# Patient Record
Sex: FEMALE | Race: WHITE | NOT HISPANIC OR LATINO | Employment: FULL TIME | URBAN - NONMETROPOLITAN AREA
[De-identification: names, ages, dates, MRNs, and addresses within clinical notes are randomized per-mention and may not be internally consistent; named-entity substitution may affect disease eponyms.]

---

## 2017-01-09 ENCOUNTER — CLINICAL SUPPORT (OUTPATIENT)
Dept: FAMILY MEDICINE CLINIC | Facility: CLINIC | Age: 42
End: 2017-01-09

## 2017-01-09 DIAGNOSIS — E53.8 VITAMIN B12 DEFICIENCY: Primary | ICD-10-CM

## 2017-01-09 PROCEDURE — 96372 THER/PROPH/DIAG INJ SC/IM: CPT | Performed by: INTERNAL MEDICINE

## 2017-01-09 RX ORDER — CYANOCOBALAMIN 1000 UG/ML
1000 INJECTION, SOLUTION INTRAMUSCULAR; SUBCUTANEOUS ONCE
Status: COMPLETED | OUTPATIENT
Start: 2017-01-09 | End: 2017-01-09

## 2017-01-09 RX ADMIN — CYANOCOBALAMIN 1000 MCG: 1000 INJECTION, SOLUTION INTRAMUSCULAR; SUBCUTANEOUS at 16:21

## 2017-02-09 ENCOUNTER — CLINICAL SUPPORT (OUTPATIENT)
Dept: FAMILY MEDICINE CLINIC | Facility: CLINIC | Age: 42
End: 2017-02-09

## 2017-02-09 DIAGNOSIS — E53.8 VITAMIN B12 DEFICIENCY: Primary | ICD-10-CM

## 2017-02-09 PROCEDURE — 96372 THER/PROPH/DIAG INJ SC/IM: CPT | Performed by: INTERNAL MEDICINE

## 2017-02-09 RX ORDER — CYANOCOBALAMIN 1000 UG/ML
1000 INJECTION, SOLUTION INTRAMUSCULAR; SUBCUTANEOUS ONCE
Status: COMPLETED | OUTPATIENT
Start: 2017-02-09 | End: 2017-02-09

## 2017-02-09 RX ADMIN — CYANOCOBALAMIN 1000 MCG: 1000 INJECTION, SOLUTION INTRAMUSCULAR; SUBCUTANEOUS at 15:59

## 2017-03-22 ENCOUNTER — OFFICE VISIT (OUTPATIENT)
Dept: GASTROENTEROLOGY | Facility: CLINIC | Age: 42
End: 2017-03-22

## 2017-03-22 ENCOUNTER — PREP FOR SURGERY (OUTPATIENT)
Dept: GASTROENTEROLOGY | Facility: CLINIC | Age: 42
End: 2017-03-22

## 2017-03-22 VITALS
WEIGHT: 139 LBS | DIASTOLIC BLOOD PRESSURE: 70 MMHG | HEIGHT: 65 IN | BODY MASS INDEX: 23.16 KG/M2 | TEMPERATURE: 99.5 F | RESPIRATION RATE: 15 BRPM | HEART RATE: 75 BPM | SYSTOLIC BLOOD PRESSURE: 127 MMHG

## 2017-03-22 DIAGNOSIS — K62.5 BRIGHT RED BLOOD PER RECTUM: ICD-10-CM

## 2017-03-22 DIAGNOSIS — Z12.11 COLON CANCER SCREENING: ICD-10-CM

## 2017-03-22 DIAGNOSIS — K62.89 ANAL OR RECTAL PAIN: ICD-10-CM

## 2017-03-22 DIAGNOSIS — K62.89 ANAL OR RECTAL PAIN: Primary | Chronic | ICD-10-CM

## 2017-03-22 DIAGNOSIS — K62.5 BRIGHT RED BLOOD PER RECTUM: Chronic | ICD-10-CM

## 2017-03-22 DIAGNOSIS — Z12.11 COLON CANCER SCREENING: Primary | ICD-10-CM

## 2017-03-22 PROBLEM — E53.8 COBALAMIN DEFICIENCY: Status: ACTIVE | Noted: 2017-03-22

## 2017-03-22 PROBLEM — M54.6 PAIN IN THORACIC SPINE: Status: ACTIVE | Noted: 2017-03-22

## 2017-03-22 PROBLEM — M54.9 BACK PAIN: Status: ACTIVE | Noted: 2017-03-22

## 2017-03-22 PROBLEM — M79.2 NEURALGIA: Status: ACTIVE | Noted: 2017-03-22

## 2017-03-22 PROBLEM — E83.52 HYPERCALCEMIA: Status: ACTIVE | Noted: 2017-03-22

## 2017-03-22 PROBLEM — E03.9 HYPOTHYROIDISM: Status: ACTIVE | Noted: 2017-03-22

## 2017-03-22 PROBLEM — E55.9 VITAMIN D DEFICIENCY: Status: ACTIVE | Noted: 2017-03-22

## 2017-03-22 PROCEDURE — 99214 OFFICE O/P EST MOD 30 MIN: CPT | Performed by: NURSE PRACTITIONER

## 2017-03-22 RX ORDER — VALACYCLOVIR HYDROCHLORIDE 500 MG/1
TABLET, FILM COATED ORAL
Refills: 1 | COMMUNITY
Start: 2017-02-20 | End: 2017-03-31

## 2017-03-22 RX ORDER — CALCIUM POLYCARBOPHIL 625 MG
TABLET ORAL DAILY
COMMUNITY
End: 2017-03-31 | Stop reason: ALTCHOICE

## 2017-03-22 RX ORDER — METRONIDAZOLE 500 MG/1
TABLET ORAL
Refills: 0 | COMMUNITY
Start: 2017-01-17 | End: 2017-03-31

## 2017-03-22 RX ORDER — SODIUM CHLORIDE 9 MG/ML
70 INJECTION, SOLUTION INTRAVENOUS CONTINUOUS PRN
Status: CANCELLED | OUTPATIENT
Start: 2017-03-22

## 2017-03-22 RX ORDER — SODIUM CHLORIDE 0.9 % (FLUSH) 0.9 %
1-10 SYRINGE (ML) INJECTION AS NEEDED
Status: CANCELLED | OUTPATIENT
Start: 2017-03-22

## 2017-03-22 RX ORDER — CLINDAMYCIN PHOSPHATE 20 MG/G
CREAM VAGINAL
Refills: 1 | COMMUNITY
Start: 2017-02-27 | End: 2017-03-31

## 2017-03-22 RX ORDER — CLOTRIMAZOLE AND BETAMETHASONE DIPROPIONATE 10; .64 MG/G; MG/G
CREAM TOPICAL
Refills: 0 | COMMUNITY
Start: 2016-12-28 | End: 2017-03-31

## 2017-03-22 NOTE — PATIENT INSTRUCTIONS
1. Obtain lab results from Dr. Verdugo's office.  2. High fiber diet with liberal water intake. Discussed in detail.  3. Colonoscopy: Description of the procedure, risks, benefits, alternatives and options, including nonoperative options, were discussed with the patient in detail. The patient understands and wishes to proceed.

## 2017-03-22 NOTE — PROGRESS NOTES
[address] [place] [address]    (H) [phone]  (W) [phone]    Chief Complaint   Patient presents with   • Rectal Bleeding   • Rectal Pain     The patient states she is here for evaluation of painful bowel movements. She states she had a yeast infection approximately 3 months ago and this proceeded to cause her pain in her vaginal and rectal area. The patient states the pain had continued until last month when it started improving. She states she had taken Flagyl orally and used a topical cream and symptoms improved. Then last week the rectal and vaginal pain returned. The patient states she has had improvement currently.    The patient denies abdominal pain, nausea or vomiting. There is no history of constipation or diarrhea. The patient denies melena. She states that occasionally she may have bright red blood on the tissue. There is no history of melena. The patient has not had a colonoscopy in the past and there is no family history of colon cancer.    Rectal Bleeding   This is a chronic problem. The current episode started more than 1 month ago (December 2016). The problem occurs rarely. The problem has been gradually improving. Pertinent negatives include no abdominal pain, arthralgias, chest pain, chills, coughing, fatigue, fever, headaches, joint swelling, myalgias, nausea, rash, vertigo or vomiting. Nothing aggravates the symptoms. She has tried nothing for the symptoms.     Review of Systems   Constitutional: Negative for appetite change, chills, fatigue, fever and unexpected weight change.   HENT: Negative for mouth sores, nosebleeds and trouble swallowing.    Eyes: Negative for discharge and redness.   Respiratory: Negative for apnea, cough and shortness of breath.    Cardiovascular: Negative for chest pain, palpitations and leg swelling.   Gastrointestinal: Positive for hematochezia. Negative for abdominal distention, abdominal pain, anal bleeding, blood in stool, constipation,  diarrhea, nausea and vomiting.   Endocrine: Negative for cold intolerance, heat intolerance and polydipsia.   Genitourinary: Negative for dysuria, hematuria and urgency.   Musculoskeletal: Negative for arthralgias, joint swelling and myalgias.   Skin: Negative for rash.   Allergic/Immunologic: Negative for food allergies and immunocompromised state.   Neurological: Negative for dizziness, vertigo, seizures, syncope and headaches.   Hematological: Negative for adenopathy. Does not bruise/bleed easily.   Psychiatric/Behavioral: Negative for dysphoric mood. The patient is not nervous/anxious and is not hyperactive.      Patient Active Problem List   Diagnosis   • Anal or rectal pain   • Bright red blood per rectum   • Back pain   • Hypercalcemia   • Hypothyroidism   • Neuralgia   • Pain in thoracic spine   • Cobalamin deficiency   • Vitamin D deficiency   • Colon cancer screening     Past Medical History:   Diagnosis Date   • B12 deficiency 2015   • Hyperlipidemia    • Hypothyroidism 2015   • Restless leg syndrome 1995   • Snores      History reviewed. No pertinent surgical history.  Family History   Problem Relation Age of Onset   • Lung cancer Father    • Other Other      arteriosclerotic cardiovascular disease, cerebrovascular accident   • Breast cancer Other    • Colon cancer Neg Hx      Social History   Substance Use Topics   • Smoking status: Never Smoker   • Smokeless tobacco: Never Used   • Alcohol use Yes      Comment: unknown       Current Outpatient Prescriptions:   •  ARMOUR THYROID 15 MG tablet, TAKE 1 TABLET DAILY, Disp: 90 tablet, Rfl: 1  •  Cats Claw, Uncaria tomentosa, (CATS CLAW PO), Take  by mouth., Disp: , Rfl:   •  OLIVE LEAF EXTRACT PO, Take  by mouth., Disp: , Rfl:   •  vitamin B-12 (CYANOCOBALAMIN) 1000 MCG tablet, Take 1,000 mcg by mouth daily., Disp: , Rfl:   •  BLACK WALNUT POLLEN SC, Inject  under the skin., Disp: , Rfl:   •  cholecalciferol (VITAMIN D3) 1000 UNITS tablet, Take 1,000 Units  "by mouth daily., Disp: , Rfl:   •  clindamycin (CLEOCIN) 2 % vaginal cream, insert 1 applicatorful vaginally AT BEDTIME  FOR 5 DAYS, Disp: , Rfl: 1  •  clotrimazole-betamethasone (LOTRISONE) 1-0.05 % cream, , Disp: , Rfl: 0  •  metroNIDAZOLE (FLAGYL) 500 MG tablet, , Disp: , Rfl: 0  •  polycarbophil 625 MG tablet tablet, Take  by mouth Daily., Disp: , Rfl:   •  valACYclovir (VALTREX) 500 MG tablet, TAKE 1 TABLET BY MOUTH TWO TIMES A DAY, Disp: , Rfl: 1    Current Facility-Administered Medications:   •  cyanocobalamin injection 1,000 mcg, 1,000 mcg, Intramuscular, Q28 Days, Paty Reinoso MD, 1,000 mcg at 08/05/16 1407  •  cyanocobalamin injection 1,000 mcg, 1,000 mcg, Intramuscular, Q28 Days, Paty Reinoso MD, 1,000 mcg at 09/13/16 1647  No Known Allergies  /70  Pulse 75  Temp 99.5 °F (37.5 °C)  Resp 15  Ht 65\" (165.1 cm)  Wt 139 lb (63 kg)  LMP 03/06/2017 (Approximate)  BMI 23.13 kg/m2     Physical Exam   Constitutional: She is oriented to person, place, and time. She appears well-developed and well-nourished. No distress.   HENT:   Head: Normocephalic and atraumatic.   Right Ear: Hearing and external ear normal.   Left Ear: Hearing and external ear normal.   Nose: Nose normal.   Mouth/Throat: Oropharynx is clear and moist and mucous membranes are normal. Mucous membranes are not pale, not dry and not cyanotic. No oral lesions. No oropharyngeal exudate.   Eyes: Conjunctivae and EOM are normal. Right eye exhibits no discharge. Left eye exhibits no discharge.   Neck: Trachea normal. Neck supple. No JVD present. No edema present. No thyroid mass and no thyromegaly present.   Cardiovascular: Normal rate, regular rhythm, S2 normal and normal heart sounds.  Exam reveals no gallop, no S3 and no friction rub.    No murmur heard.  Pulmonary/Chest: Effort normal and breath sounds normal. No respiratory distress. She exhibits no tenderness.   Abdominal: Normal appearance and bowel sounds are normal. She exhibits " no distension, no ascites and no mass. There is no splenomegaly or hepatomegaly. There is no tenderness. There is no rigidity, no rebound and no guarding. No hernia.       Vascular Status -  Her exam exhibits no right foot edema. Her exam exhibits no left foot edema.  Lymphadenopathy:     She has no cervical adenopathy.        Left: No supraclavicular adenopathy present.   Neurological: She is alert and oriented to person, place, and time. She has normal strength. No cranial nerve deficit or sensory deficit.   Skin: No rash noted. She is not diaphoretic. No cyanosis. No pallor. Nails show no clubbing.   Psychiatric: She has a normal mood and affect.   Nursing note and vitals reviewed.    Laboratory Tests:    Upon review of records:    Dated 12/6/2016 glucose 86 BUN 13 creatinine 0.8 sodium 135 potassium 4.3 chloride 102 CO2 24 calcium 10.3 albumin 4.5 ALT 15 AST 18 alkaline phosphatase 47 total bilirubin 1.9 WBC 7.89 hemoglobin 13.4 hematocrit 40.3 platelet count 254 MCV 92.4 vitamin B12 608 vitamin D 29.4 TSH 3.148    Lucille was seen today for rectal bleeding and rectal pain.    Diagnoses and all orders for this visit:    Anal or rectal pain  Comments:  History of pain in rectum with bowel movements. Differentials include hemorrhoids, underlying colonic neoplastic disease.    Bright red blood per rectum  Comments:  Differentials include hemorrhoids, fissure, vascular ectasia.    Colon cancer screening  Comments:  No history of previous colonoscopy. No family history of colon cancer.        Plan   Patient Instructions   1. Obtain lab results from Dr. Verdugo's office.  2. High fiber diet with liberal water intake. Discussed in detail.  3. Colonoscopy: Description of the procedure, risks, benefits, alternatives and options, including nonoperative options, were discussed with the patient in detail. The patient understands and wishes to proceed.    Patient Care Team:  Paty Reinoso MD as PCP - General Karolyn FOLEY  Gurdeep, APRN

## 2017-04-04 ENCOUNTER — ANESTHESIA EVENT (OUTPATIENT)
Dept: GASTROENTEROLOGY | Facility: HOSPITAL | Age: 42
End: 2017-04-04

## 2017-04-04 ENCOUNTER — ANESTHESIA (OUTPATIENT)
Dept: GASTROENTEROLOGY | Facility: HOSPITAL | Age: 42
End: 2017-04-04

## 2017-04-04 ENCOUNTER — HOSPITAL ENCOUNTER (OUTPATIENT)
Facility: HOSPITAL | Age: 42
Setting detail: HOSPITAL OUTPATIENT SURGERY
Discharge: HOME | End: 2017-04-04
Attending: INTERNAL MEDICINE | Admitting: INTERNAL MEDICINE

## 2017-04-04 VITALS
BODY MASS INDEX: 23.32 KG/M2 | DIASTOLIC BLOOD PRESSURE: 63 MMHG | RESPIRATION RATE: 18 BRPM | TEMPERATURE: 97.8 F | HEART RATE: 70 BPM | WEIGHT: 140 LBS | SYSTOLIC BLOOD PRESSURE: 100 MMHG | HEIGHT: 65 IN | OXYGEN SATURATION: 100 %

## 2017-04-04 DIAGNOSIS — Z12.11 COLON CANCER SCREENING: ICD-10-CM

## 2017-04-04 DIAGNOSIS — K62.5 BRIGHT RED BLOOD PER RECTUM: ICD-10-CM

## 2017-04-04 DIAGNOSIS — K62.89 ANAL OR RECTAL PAIN: ICD-10-CM

## 2017-04-04 LAB — B-HCG UR QL: NEGATIVE

## 2017-04-04 PROCEDURE — 25010000002 PROPOFOL 10 MG/ML EMULSION

## 2017-04-04 PROCEDURE — G0121 COLON CA SCRN NOT HI RSK IND: HCPCS | Performed by: INTERNAL MEDICINE

## 2017-04-04 PROCEDURE — 81025 URINE PREGNANCY TEST: CPT | Performed by: NURSE PRACTITIONER

## 2017-04-04 RX ORDER — SODIUM CHLORIDE 0.9 % (FLUSH) 0.9 %
1-10 SYRINGE (ML) INJECTION AS NEEDED
Status: DISCONTINUED | OUTPATIENT
Start: 2017-04-04 | End: 2017-04-04 | Stop reason: HOSPADM

## 2017-04-04 RX ORDER — SODIUM CHLORIDE 9 MG/ML
70 INJECTION, SOLUTION INTRAVENOUS CONTINUOUS PRN
Status: DISCONTINUED | OUTPATIENT
Start: 2017-04-04 | End: 2017-04-04 | Stop reason: HOSPADM

## 2017-04-04 RX ORDER — PROPOFOL 10 MG/ML
INJECTION, EMULSION INTRAVENOUS AS NEEDED
Status: DISCONTINUED | OUTPATIENT
Start: 2017-04-04 | End: 2017-04-04 | Stop reason: SURG

## 2017-04-04 RX ORDER — PROPOFOL 10 MG/ML
INJECTION, EMULSION INTRAVENOUS CONTINUOUS PRN
Status: DISCONTINUED | OUTPATIENT
Start: 2017-04-04 | End: 2017-04-04 | Stop reason: SURG

## 2017-04-04 RX ADMIN — SODIUM CHLORIDE: 9 INJECTION, SOLUTION INTRAVENOUS at 11:28

## 2017-04-04 RX ADMIN — PROPOFOL 100 MCG/KG/MIN: 10 INJECTION, EMULSION INTRAVENOUS at 11:28

## 2017-04-04 RX ADMIN — PROPOFOL 50 MG: 10 INJECTION, EMULSION INTRAVENOUS at 11:35

## 2017-04-04 RX ADMIN — PROPOFOL 50 MG: 10 INJECTION, EMULSION INTRAVENOUS at 11:54

## 2017-04-04 RX ADMIN — PROPOFOL 50 MG: 10 INJECTION, EMULSION INTRAVENOUS at 11:48

## 2017-04-04 RX ADMIN — SODIUM CHLORIDE 70 ML/HR: 9 INJECTION, SOLUTION INTRAVENOUS at 10:04

## 2017-04-04 RX ADMIN — PROPOFOL 100 MG: 10 INJECTION, EMULSION INTRAVENOUS at 11:34

## 2017-04-04 RX ADMIN — LIDOCAINE HYDROCHLORIDE 40 MG: 20 INJECTION, SOLUTION INTRAVENOUS at 11:28

## 2017-04-04 NOTE — ANESTHESIA PREPROCEDURE EVALUATION
Anesthesia Evaluation     Patient summary reviewed and Nursing notes reviewed   no history of anesthetic complications:  NPO Status: > 8 hours   Airway   Mallampati: I  TM distance: >3 FB  Neck ROM: full  no difficulty expected  Dental - normal exam     Pulmonary - negative pulmonary ROS and normal exam   Cardiovascular - negative cardio ROS and normal exam        Neuro/Psych  (+) numbness,    GI/Hepatic/Renal/Endo    (+)  hypothyroidism,     Musculoskeletal     (+) back pain,   Abdominal    Substance History - negative use     OB/GYN negative ob/gyn ROS         Other - negative ROS                                   Anesthesia Plan    ASA 2     MAC   (Risks and benefits discussed including risk of aspiration, recall and dental damage. All patient questions answered. Will continue with POC.)  intravenous induction   Anesthetic plan and risks discussed with patient.

## 2017-04-04 NOTE — ANESTHESIA POSTPROCEDURE EVALUATION
Patient: Lucille Linder    Procedure Summary     Date Anesthesia Start Anesthesia Stop Room / Location    04/04/17 1128 1204 [facility] ENDOSCOPY 2 / [facility] ENDOSCOPY       Procedure Diagnosis Surgeon Provider    COLONOSCOPY WITH ANOSCOPY (N/A Anus) Anal or rectal pain; Bright red blood per rectum; Colon cancer screening  (Anal or rectal pain [K62.89]; Bright red blood per rectum [K62.5]; Colon cancer screening [Z12.11]) MD Irvin Quinones CRNA          Anesthesia Type: MAC  Last vitals  BP      Temp      Pulse     Resp      SpO2        Post Anesthesia Care and Evaluation    Patient location during evaluation: PACU  Patient participation: complete - patient participated  Level of consciousness: awake  Pain score: 0  Pain management: satisfactory to patient  Airway patency: patent  Anesthetic complications: No anesthetic complications  PONV Status: none  Cardiovascular status: acceptable and hemodynamically stable  Respiratory status: acceptable  Hydration status: acceptable

## 2017-04-04 NOTE — OP NOTE
PROCEDURE:  Colonoscopy to the terminal ileum.      DATE OF PROCEDURE:  April 9, 2017    REFERRING PROVIDER:  Paty Reinoso MD     INSTRUMENT USED: Olympus PCF H 190 DL videocolonoscope.     INDICATIONS OF THE PROCEDURE:   This is a 41-year-old white female for colon cancer screening.  There is history of recurrent bright red blood per rectum.     BIOPSIES:  None.      PHOTOGRAPHS:  Photographs were included in the medical records.     MEDICATIONS:  MAC.       CONSENT/PREPROCEDURE EVALUATION:  Risks, benefits, alternatives and options of the procedure including risks of sedation/anesthesia were discussed with the patient and informed consent was obtained prior to the procedure.  History and physical examination were performed and nothing precluded the test.      REPORT:  The patient was placed in left lateral decubitus position and a digital examination was performed.  Once under the influence of IV sedation, the instrument was inserted into the rectum and advanced under direct vision to cecum which was identified by the ileocecal valve, triradiate folds and appendiceal orifice. The scope was then maneuvered into the terminal ileum.        FINDINGS:      Digital rectal examination:  Good anal tone.  No perianal pathology.  No mass.        Terminal ileum:  7-8 cm.  Normal.     Cecum and ascending colon: Normal.       Hepatic flexure, transverse colon, splenic flexure:  Normal.         Descending colon, sigmoid colon and rectum: Some fullness was noted in the distal rectum.  A retroflex examination within the rectum revealed internal hemorrhoids.      Annoscopy: Using a clear annoscope and colonoscope as a light source, annoscopy was performed.  Dentate line was noted.  No fissures or tears were seen.  The scope was then pulled out of the patient.  The patient tolerated the procedure well.      The scope was then straightened, the lower GI tract was decompressed, and the scope was pulled out of the patient.  The  patient tolerated the procedure well.  There were no immediate complications and the patient was transferred in stable condition for post procedure observation.      TECHNICAL DATA:   1. [place] prep score: 8 (3+2+3).     2. Difficulty of examination:  Average.     3. Withdrawal time: 8 min.    4. Retroflex examination in right colon: Yes.    5. Second look Rectum to cecum with decompression: Yes.    DIAGNOSES:    1. Internal hemorrhoids.    2. Some fullness was noted in the distal rectum which is likely secondary tampon.  Otherwise normal exam to terminal ileum.  Annoscopy did not reveal fissures or tears.      RECOMMENDATIONS:     1. Follow-up:  As needed.  2. Followup colonoscopy in 10 years.     3. Dietary instructions.  4. Hemorrhoidal care.  5.   A possible pelvic exam or imaging if not already undertaken.  Some fullness was seen within the distal rectum which is likely secondary to tampon.     Thank you very much for letting me participate in the care of this patient. Please do not hesitate to call me if you have any questions.

## 2017-04-04 NOTE — PLAN OF CARE
Problem: GI Endoscopy (Adult)  Goal: Signs and Symptoms of Listed Potential Problems Will be Absent or Manageable (GI Endoscopy)  Outcome: Ongoing (interventions implemented as appropriate)    04/04/17 0952   GI Endoscopy   Problems Assessed (GI Endoscopy) all   Problems Present (GI Endoscopy) none

## 2017-04-19 ENCOUNTER — CLINICAL SUPPORT (OUTPATIENT)
Dept: FAMILY MEDICINE CLINIC | Facility: CLINIC | Age: 42
End: 2017-04-19

## 2017-04-19 DIAGNOSIS — E53.8 VITAMIN B12 DEFICIENCY: Primary | ICD-10-CM

## 2017-04-19 PROCEDURE — 96372 THER/PROPH/DIAG INJ SC/IM: CPT | Performed by: INTERNAL MEDICINE

## 2017-04-19 RX ORDER — CYANOCOBALAMIN 1000 UG/ML
1000 INJECTION, SOLUTION INTRAMUSCULAR; SUBCUTANEOUS ONCE
Status: COMPLETED | OUTPATIENT
Start: 2017-04-19 | End: 2017-04-19

## 2017-04-19 RX ADMIN — CYANOCOBALAMIN 1000 MCG: 1000 INJECTION, SOLUTION INTRAMUSCULAR; SUBCUTANEOUS at 16:44

## 2017-06-20 ENCOUNTER — CLINICAL SUPPORT (OUTPATIENT)
Dept: FAMILY MEDICINE CLINIC | Facility: CLINIC | Age: 42
End: 2017-06-20

## 2017-06-20 DIAGNOSIS — E53.8 VITAMIN B12 DEFICIENCY: Primary | ICD-10-CM

## 2017-06-20 PROCEDURE — 96372 THER/PROPH/DIAG INJ SC/IM: CPT | Performed by: INTERNAL MEDICINE

## 2017-06-20 RX ORDER — CYANOCOBALAMIN 1000 UG/ML
1000 INJECTION, SOLUTION INTRAMUSCULAR; SUBCUTANEOUS ONCE
Status: COMPLETED | OUTPATIENT
Start: 2017-06-20 | End: 2017-06-20

## 2017-06-20 RX ADMIN — CYANOCOBALAMIN 1000 MCG: 1000 INJECTION, SOLUTION INTRAMUSCULAR; SUBCUTANEOUS at 15:21

## 2017-06-21 ENCOUNTER — OFFICE VISIT (OUTPATIENT)
Dept: GASTROENTEROLOGY | Facility: CLINIC | Age: 42
End: 2017-06-21

## 2017-06-21 VITALS
WEIGHT: 138 LBS | RESPIRATION RATE: 14 BRPM | HEIGHT: 65 IN | DIASTOLIC BLOOD PRESSURE: 68 MMHG | BODY MASS INDEX: 22.99 KG/M2 | HEART RATE: 73 BPM | SYSTOLIC BLOOD PRESSURE: 116 MMHG | TEMPERATURE: 97.8 F

## 2017-06-21 DIAGNOSIS — K64.8 INTERNAL HEMORRHOIDS: Chronic | ICD-10-CM

## 2017-06-21 DIAGNOSIS — R19.8 RECTAL FULLNESS: ICD-10-CM

## 2017-06-21 DIAGNOSIS — K62.5 BRIGHT RED BLOOD PER RECTUM: Primary | Chronic | ICD-10-CM

## 2017-06-21 DIAGNOSIS — K62.89 ANAL OR RECTAL PAIN: ICD-10-CM

## 2017-06-21 PROCEDURE — 99213 OFFICE O/P EST LOW 20 MIN: CPT | Performed by: NURSE PRACTITIONER

## 2017-06-21 NOTE — PROGRESS NOTES
[address] [place] [address]    Chief Complaint   Patient presents with   • Follow-up     The patient is here for follow up. She states she has been doing much better. She states she is no longer having pain in the anal area. She states this resolved when her GYN problems resolved. She has not had any further bright red blood per rectum. She denies abdominal pain, nausea or vomiting. There is no history of difficulty swallowing. No history of heartburn. The patient denies constipation or diarrhea. No history of melena. Of interest, the patient states she was on her menstrual cycle during colonoscopy and had tampon in place.    Rectal Bleeding   This is a chronic problem. The current episode started more than 1 month ago (December 2016). The problem occurs rarely. The problem has been rapidly improving. Pertinent negatives include no abdominal pain, arthralgias, chest pain, chills, coughing, fatigue, fever, headaches, joint swelling, myalgias, nausea, rash, vertigo or vomiting. Nothing aggravates the symptoms. She has tried nothing for the symptoms.     Review of Systems   Constitutional: Negative for appetite change, chills, fatigue, fever and unexpected weight change.   HENT: Negative for mouth sores, nosebleeds and trouble swallowing.    Eyes: Negative for discharge and redness.   Respiratory: Negative for apnea, cough and shortness of breath.    Cardiovascular: Negative for chest pain, palpitations and leg swelling.   Gastrointestinal: Positive for hematochezia. Negative for abdominal distention, abdominal pain, anal bleeding, blood in stool, constipation, diarrhea, nausea and vomiting.   Endocrine: Negative for cold intolerance, heat intolerance and polydipsia.   Genitourinary: Negative for dysuria, hematuria and urgency.   Musculoskeletal: Negative for arthralgias, joint swelling and myalgias.   Skin: Negative for rash.   Allergic/Immunologic: Negative for food allergies and immunocompromised state.    Neurological: Negative for dizziness, vertigo, seizures, syncope and headaches.   Hematological: Negative for adenopathy. Does not bruise/bleed easily.   Psychiatric/Behavioral: Negative for dysphoric mood. The patient is not nervous/anxious and is not hyperactive.      Patient Active Problem List   Diagnosis   • Anal or rectal pain   • Bright red blood per rectum   • Back pain   • Hypercalcemia   • Hypothyroidism   • Neuralgia   • Pain in thoracic spine   • Cobalamin deficiency   • Vitamin D deficiency   • Colon cancer screening   • Internal hemorrhoids   • Rectal fullness     Past Medical History:   Diagnosis Date   • B12 deficiency 2015   • Hyperlipidemia    • Hypothyroidism 2015   • Restless leg syndrome 1995   • Snores      Past Surgical History:   Procedure Laterality Date   • COLONOSCOPY N/A 4/4/2017    Procedure: COLONOSCOPY WITH ANOSCOPY;  Surgeon: Cezar Greenberg MD;  Location: [facility] ENDOSCOPY;  Service:      Family History   Problem Relation Age of Onset   • Lung cancer Father    • Other Other      arteriosclerotic cardiovascular disease, cerebrovascular accident   • Breast cancer Other    • Colon cancer Neg Hx      Social History   Substance Use Topics   • Smoking status: Never Smoker   • Smokeless tobacco: Never Used   • Alcohol use Yes      Comment: OCCASSIONALLY-1 DRINK PER DAY.  USUALLY BEER       Current Outpatient Prescriptions:   •  ARMOUR THYROID 15 MG tablet, TAKE 1 TABLET DAILY, Disp: 90 tablet, Rfl: 1  •  Cats Claw, Uncaria tomentosa, (CATS CLAW PO), Take 1 tablet by mouth Daily., Disp: , Rfl:   •  OLIVE LEAF EXTRACT PO, Take  by mouth., Disp: , Rfl:     Current Facility-Administered Medications:   •  cyanocobalamin injection 1,000 mcg, 1,000 mcg, Intramuscular, Q28 Days, Paty Reinoso MD, 1,000 mcg at 08/05/16 1407  •  cyanocobalamin injection 1,000 mcg, 1,000 mcg, Intramuscular, Q28 Days, Paty Reinoso MD, 1,000 mcg at 09/13/16 1647    No Known Allergies    /68  Pulse 73  Temp 97.8  "°F (36.6 °C)  Resp 14  Ht 65\" (165.1 cm)  Wt 138 lb (62.6 kg)  BMI 22.96 kg/m2    Physical Exam   Constitutional: She is oriented to person, place, and time. She appears well-developed and well-nourished. No distress.   HENT:   Head: Normocephalic and atraumatic.   Right Ear: Hearing and external ear normal.   Left Ear: Hearing and external ear normal.   Nose: Nose normal.   Mouth/Throat: Oropharynx is clear and moist and mucous membranes are normal. Mucous membranes are not pale, not dry and not cyanotic. No oral lesions. No oropharyngeal exudate.   Eyes: Conjunctivae and EOM are normal. Right eye exhibits no discharge. Left eye exhibits no discharge.   Neck: Trachea normal. Neck supple. No JVD present. No edema present. No thyroid mass and no thyromegaly present.   Cardiovascular: Normal rate, regular rhythm, S2 normal and normal heart sounds.  Exam reveals no gallop, no S3 and no friction rub.    No murmur heard.  Pulmonary/Chest: Effort normal and breath sounds normal. No respiratory distress. She exhibits no tenderness.   Abdominal: Normal appearance and bowel sounds are normal. She exhibits no distension, no ascites and no mass. There is no splenomegaly or hepatomegaly. There is no tenderness. There is no rigidity, no rebound and no guarding. No hernia.       Vascular Status -  Her exam exhibits no right foot edema. Her exam exhibits no left foot edema.  Lymphadenopathy:     She has no cervical adenopathy.        Left: No supraclavicular adenopathy present.   Neurological: She is alert and oriented to person, place, and time. She has normal strength. No cranial nerve deficit or sensory deficit.   Skin: No rash noted. She is not diaphoretic. No cyanosis. No pallor. Nails show no clubbing.   Psychiatric: She has a normal mood and affect.   Nursing note and vitals reviewed.  Stigmata of chronic liver disease:  None.  Asterixis:  None.     Laboratory Results:  Upon review of records:     Dated 12/6/2016 " glucose 86 BUN 13 creatinine 0.8 sodium 135 potassium 4.3 chloride 102 CO2 24 calcium 10.3 albumin 4.5 ALT 15 AST 18 alkaline phosphatase 47 total bilirubin 1.9 WBC 7.89 hemoglobin 13.4 hematocrit 40.3 platelet count 254 MCV 92.4 vitamin B12 608 vitamin D 29.4 TSH 3.148    Procedures:   Upon review of records:    Colonoscopy dated 4/4/2017 reveals internal hemorrhoids.  Some fullness was noted in the distal rectum which is likely secondary tampon.  Otherwise normal exam to terminal ileum.  Endoscopy did not reveal fissures or tears.  No biopsy.    Assessment and Plan:      Lucille was seen today for follow-up.    Diagnoses and all orders for this visit:    Bright red blood per rectum  Comments:  Likely secondary to internal hemorrhoids.    Anal or rectal pain  Comments:  Resolved.    Internal hemorrhoids  Comments:  Stable. Uncomplicated.    Rectal fullness  Comments:  Some fullness was noted in the distal rectum which is likely secondary tampon.        Plan  and Patient Instructions:  Patient Instructions   1. High fiber diet with liberal water intake. Discussed in detail.  2. Patient may need further pelvic evaluation for fullness in rectum if not already undertaken.  3. Follow up colonoscopy in 10 years.  4. Follow up: PRN    Patient Care Team:  Paty Reinoso MD as PCP - General    YARI Peterson

## 2017-06-21 NOTE — PATIENT INSTRUCTIONS
1. High fiber diet with liberal water intake. Discussed in detail.  2. Patient may need further pelvic evaluation for fullness in rectum if not already undertaken.  3. Follow up colonoscopy in 10 years.  4. Follow up: PRN

## 2017-07-07 ENCOUNTER — CLINICAL SUPPORT (OUTPATIENT)
Dept: FAMILY MEDICINE CLINIC | Facility: CLINIC | Age: 42
End: 2017-07-07

## 2017-07-07 DIAGNOSIS — E56.9 VITAMIN DEFICIENCY: Primary | ICD-10-CM

## 2017-07-07 PROCEDURE — 96372 THER/PROPH/DIAG INJ SC/IM: CPT | Performed by: INTERNAL MEDICINE

## 2017-07-07 RX ORDER — CYANOCOBALAMIN 1000 UG/ML
1000 INJECTION, SOLUTION INTRAMUSCULAR; SUBCUTANEOUS
Status: DISCONTINUED | OUTPATIENT
Start: 2017-07-07 | End: 2020-01-31

## 2017-07-07 RX ADMIN — CYANOCOBALAMIN 1000 MCG: 1000 INJECTION, SOLUTION INTRAMUSCULAR; SUBCUTANEOUS at 11:17

## 2017-07-27 RX ORDER — THYROID,PORK 15 MG
TABLET ORAL
Qty: 30 TABLET | Refills: 1 | Status: SHIPPED | OUTPATIENT
Start: 2017-07-27 | End: 2017-09-26 | Stop reason: SDUPTHER

## 2017-08-11 ENCOUNTER — CLINICAL SUPPORT (OUTPATIENT)
Dept: FAMILY MEDICINE CLINIC | Facility: CLINIC | Age: 42
End: 2017-08-11

## 2017-08-11 DIAGNOSIS — E53.8 VITAMIN B12 DEFICIENCY: Primary | ICD-10-CM

## 2017-08-11 PROCEDURE — 96372 THER/PROPH/DIAG INJ SC/IM: CPT | Performed by: INTERNAL MEDICINE

## 2017-08-11 RX ORDER — CYANOCOBALAMIN 1000 UG/ML
1000 INJECTION, SOLUTION INTRAMUSCULAR; SUBCUTANEOUS ONCE
Status: COMPLETED | OUTPATIENT
Start: 2017-08-11 | End: 2017-08-11

## 2017-08-11 RX ADMIN — CYANOCOBALAMIN 1000 MCG: 1000 INJECTION, SOLUTION INTRAMUSCULAR; SUBCUTANEOUS at 11:25

## 2017-09-19 ENCOUNTER — CLINICAL SUPPORT (OUTPATIENT)
Dept: FAMILY MEDICINE CLINIC | Facility: CLINIC | Age: 42
End: 2017-09-19

## 2017-09-19 DIAGNOSIS — E53.8 COBALAMIN DEFICIENCY: ICD-10-CM

## 2017-09-19 PROCEDURE — 96372 THER/PROPH/DIAG INJ SC/IM: CPT | Performed by: INTERNAL MEDICINE

## 2017-09-19 RX ADMIN — CYANOCOBALAMIN 1000 MCG: 1000 INJECTION, SOLUTION INTRAMUSCULAR; SUBCUTANEOUS at 10:56

## 2017-09-26 RX ORDER — LEVOTHYROXINE AND LIOTHYRONINE 9.5; 2.25 UG/1; UG/1
15 TABLET ORAL DAILY
Qty: 30 TABLET | Refills: 1 | Status: SHIPPED | OUTPATIENT
Start: 2017-09-26 | End: 2017-12-21

## 2017-12-11 ENCOUNTER — TRANSCRIBE ORDERS (OUTPATIENT)
Dept: ADMINISTRATIVE | Facility: HOSPITAL | Age: 42
End: 2017-12-11

## 2017-12-11 DIAGNOSIS — Z12.39 SCREENING BREAST EXAMINATION: Primary | ICD-10-CM

## 2017-12-18 ENCOUNTER — CLINICAL SUPPORT (OUTPATIENT)
Dept: FAMILY MEDICINE CLINIC | Facility: CLINIC | Age: 42
End: 2017-12-18

## 2017-12-18 ENCOUNTER — LAB (OUTPATIENT)
Dept: FAMILY MEDICINE CLINIC | Facility: CLINIC | Age: 42
End: 2017-12-18

## 2017-12-18 DIAGNOSIS — E55.9 VITAMIN D DEFICIENCY: ICD-10-CM

## 2017-12-18 DIAGNOSIS — E03.9 HYPOTHYROIDISM: ICD-10-CM

## 2017-12-18 DIAGNOSIS — E53.8 VITAMIN B12 DEFICIENCY: Primary | ICD-10-CM

## 2017-12-18 DIAGNOSIS — E78.2 MIXED HYPERLIPIDEMIA: ICD-10-CM

## 2017-12-18 DIAGNOSIS — E53.8 VITAMIN B12 DEFICIENCY: ICD-10-CM

## 2017-12-18 LAB
25(OH)D3+25(OH)D2 SERPL-MCNC: 20.9 NG/ML
ALBUMIN SERPL-MCNC: 3.9 G/DL (ref 3.5–5)
ALBUMIN/GLOB SERPL: 1.4 G/DL (ref 1–2)
ALP SERPL-CCNC: 40 U/L (ref 38–126)
ALT SERPL-CCNC: 20 U/L (ref 13–69)
AST SERPL-CCNC: 19 U/L (ref 15–46)
BASOPHILS # BLD AUTO: 0.05 10*3/MM3 (ref 0–0.2)
BASOPHILS NFR BLD AUTO: 0.9 % (ref 0–2.5)
BILIRUB SERPL-MCNC: 1.5 MG/DL (ref 0.2–1.3)
BUN SERPL-MCNC: 15 MG/DL (ref 7–20)
BUN/CREAT SERPL: 18.8 (ref 7.1–23.5)
CALCIUM SERPL-MCNC: 9.5 MG/DL (ref 8.4–10.2)
CHLORIDE SERPL-SCNC: 106 MMOL/L (ref 98–107)
CHOLEST SERPL-MCNC: 151 MG/DL (ref 0–199)
CO2 SERPL-SCNC: 25 MMOL/L (ref 26–30)
CREAT SERPL-MCNC: 0.8 MG/DL (ref 0.6–1.3)
EOSINOPHIL # BLD AUTO: 0.19 10*3/MM3 (ref 0–0.7)
EOSINOPHIL NFR BLD AUTO: 3.4 % (ref 0–7)
ERYTHROCYTE [DISTWIDTH] IN BLOOD BY AUTOMATED COUNT: 12.3 % (ref 11.5–14.5)
GFR SERPLBLD CREATININE-BSD FMLA CKD-EPI: 79 ML/MIN/1.73
GFR SERPLBLD CREATININE-BSD FMLA CKD-EPI: 96 ML/MIN/1.73
GLOBULIN SER CALC-MCNC: 2.7 GM/DL
GLUCOSE SERPL-MCNC: 78 MG/DL (ref 74–98)
HCT VFR BLD AUTO: 38.6 % (ref 37–47)
HDLC SERPL-MCNC: 69 MG/DL (ref 40–60)
HGB BLD-MCNC: 12.6 G/DL (ref 12–16)
IMM GRANULOCYTES # BLD: 0.02 10*3/MM3 (ref 0–0.06)
IMM GRANULOCYTES NFR BLD: 0.4 % (ref 0–0.6)
LDLC SERPL CALC-MCNC: 71 MG/DL (ref 0–99)
LYMPHOCYTES # BLD AUTO: 1.72 10*3/MM3 (ref 0.6–3.4)
LYMPHOCYTES NFR BLD AUTO: 30.7 % (ref 10–50)
MCH RBC QN AUTO: 29.9 PG (ref 27–31)
MCHC RBC AUTO-ENTMCNC: 32.6 G/DL (ref 30–37)
MCV RBC AUTO: 91.5 FL (ref 81–99)
MONOCYTES # BLD AUTO: 0.4 10*3/MM3 (ref 0–0.9)
MONOCYTES NFR BLD AUTO: 7.1 % (ref 0–12)
NEUTROPHILS # BLD AUTO: 3.23 10*3/MM3 (ref 2–6.9)
NEUTROPHILS NFR BLD AUTO: 57.5 % (ref 37–80)
NRBC BLD AUTO-RTO: 0 /100 WBC (ref 0–0)
PLATELET # BLD AUTO: 210 10*3/MM3 (ref 130–400)
POTASSIUM SERPL-SCNC: 4.1 MMOL/L (ref 3.5–5.1)
PROT SERPL-MCNC: 6.6 G/DL (ref 6.3–8.2)
RBC # BLD AUTO: 4.22 10*6/MM3 (ref 4.2–5.4)
SODIUM SERPL-SCNC: 140 MMOL/L (ref 137–145)
TRIGL SERPL-MCNC: 54 MG/DL
TSH SERPL DL<=0.005 MIU/L-ACNC: 3.14 MIU/ML (ref 0.47–4.68)
VIT B12 SERPL-MCNC: 572 PG/ML (ref 239–931)
VLDLC SERPL CALC-MCNC: 10.8 MG/DL
WBC # BLD AUTO: 5.61 10*3/MM3 (ref 4.8–10.8)

## 2017-12-18 PROCEDURE — 96372 THER/PROPH/DIAG INJ SC/IM: CPT | Performed by: INTERNAL MEDICINE

## 2017-12-18 RX ORDER — CYANOCOBALAMIN 1000 UG/ML
1000 INJECTION, SOLUTION INTRAMUSCULAR; SUBCUTANEOUS
Status: DISCONTINUED | OUTPATIENT
Start: 2017-12-18 | End: 2020-01-31

## 2017-12-18 RX ADMIN — CYANOCOBALAMIN 1000 MCG: 1000 INJECTION, SOLUTION INTRAMUSCULAR; SUBCUTANEOUS at 10:06

## 2017-12-19 RX ORDER — ERGOCALCIFEROL 1.25 MG/1
50000 CAPSULE ORAL WEEKLY
Qty: 8 CAPSULE | Refills: 0 | Status: SHIPPED | OUTPATIENT
Start: 2017-12-19 | End: 2017-12-21

## 2017-12-21 ENCOUNTER — OFFICE VISIT (OUTPATIENT)
Dept: FAMILY MEDICINE CLINIC | Facility: CLINIC | Age: 42
End: 2017-12-21

## 2017-12-21 VITALS
DIASTOLIC BLOOD PRESSURE: 61 MMHG | OXYGEN SATURATION: 100 % | WEIGHT: 137 LBS | HEART RATE: 72 BPM | TEMPERATURE: 98.3 F | RESPIRATION RATE: 16 BRPM | HEIGHT: 65 IN | SYSTOLIC BLOOD PRESSURE: 104 MMHG | BODY MASS INDEX: 22.82 KG/M2

## 2017-12-21 DIAGNOSIS — Z00.00 ROUTINE GENERAL MEDICAL EXAMINATION AT A HEALTH CARE FACILITY: Primary | ICD-10-CM

## 2017-12-21 PROCEDURE — 99396 PREV VISIT EST AGE 40-64: CPT | Performed by: INTERNAL MEDICINE

## 2017-12-21 NOTE — PROGRESS NOTES
Subjective   Lucille Linder is a 42 y.o. female.     Chief Complaint   Patient presents with   • Annual Exam       History of Present Illness   Patient is here for a physical , she had labs and saw her GYN and had a pap smear - 2017 and mammogram , she also had a colonoscopy this year    Review of Systems   Constitutional: Negative for appetite change, fatigue and fever.   HENT: Negative for congestion, ear discharge, ear pain, sinus pressure and sore throat.    Eyes: Negative for pain and discharge.   Respiratory: Negative for cough, chest tightness, shortness of breath and wheezing.    Cardiovascular: Negative for chest pain, palpitations and leg swelling.   Gastrointestinal: Negative for abdominal pain, blood in stool, constipation, diarrhea and nausea.   Endocrine: Negative for cold intolerance and heat intolerance.   Genitourinary: Negative for dysuria, flank pain and frequency.   Musculoskeletal: Negative for back pain and joint swelling.   Skin: Negative for color change.   Allergic/Immunologic: Negative for environmental allergies and food allergies.   Neurological: Negative for dizziness, weakness, numbness and headaches.   Hematological: Negative for adenopathy. Does not bruise/bleed easily.   Psychiatric/Behavioral: Negative for behavioral problems and dysphoric mood. The patient is not nervous/anxious.        Past Medical History:   Diagnosis Date   • B12 deficiency 2015   • Hyperlipidemia    • Hypothyroidism 2015   • Restless leg syndrome 1995   • Snores        Past Surgical History:   Procedure Laterality Date   • COLONOSCOPY N/A 4/4/2017    Procedure: COLONOSCOPY WITH ANOSCOPY;  Surgeon: Cezar Greenberg MD;  Location: [facility] ENDOSCOPY;  Service:        Family History   Problem Relation Age of Onset   • Lung cancer Father    • Other Other      arteriosclerotic cardiovascular disease, cerebrovascular accident   • Breast cancer Other    • Colon cancer Neg Hx         reports that she has never  "smoked. She has never used smokeless tobacco. She reports that she drinks alcohol. She reports that she does not use illicit drugs.    No Known Allergies        Current Outpatient Prescriptions:   •  ARMOUR THYROID 15 MG tablet, TAKE 1 TABLET DAILY, Disp: 90 tablet, Rfl: 1    Current Facility-Administered Medications:   •  cyanocobalamin injection 1,000 mcg, 1,000 mcg, Intramuscular, Q28 Days, Paty Reinoso MD, 1,000 mcg at 07/07/17 1117  •  cyanocobalamin injection 1,000 mcg, 1,000 mcg, Intramuscular, Q28 Days, Paty Reinoso MD, 1,000 mcg at 09/19/17 1056  •  cyanocobalamin injection 1,000 mcg, 1,000 mcg, Intramuscular, Q28 Days, Paty Reinoso MD  •  cyanocobalamin injection 1,000 mcg, 1,000 mcg, Intramuscular, Q28 Days, Paty Reinoso MD, 1,000 mcg at 12/18/17 1006      Objective   Blood pressure 104/61, pulse 72, temperature 98.3 °F (36.8 °C), resp. rate 16, height 165.1 cm (65\"), weight 62.1 kg (137 lb), SpO2 100 %.    Physical Exam   Constitutional: She is oriented to person, place, and time. She appears well-developed and well-nourished. No distress.   HENT:   Head: Normocephalic and atraumatic.   Right Ear: External ear normal.   Left Ear: External ear normal.   Nose: Nose normal.   Mouth/Throat: Oropharynx is clear and moist.   Eyes: Conjunctivae and EOM are normal. Pupils are equal, round, and reactive to light.   Neck: Neck supple. No thyromegaly present.   Cardiovascular: Normal rate, regular rhythm and normal heart sounds.    Pulmonary/Chest: Effort normal and breath sounds normal. No respiratory distress.   Abdominal: Soft. Bowel sounds are normal. She exhibits no distension. There is no tenderness. There is no rebound.   Musculoskeletal: Normal range of motion. She exhibits no edema.   Lymphadenopathy:     She has no cervical adenopathy.   Neurological: She is alert and oriented to person, place, and time.   No gross motor or sensory deficits   Skin: Skin is warm. She is not diaphoretic.   Psychiatric: " She has a normal mood and affect.   Nursing note and vitals reviewed.        Results for orders placed or performed in visit on 12/18/17   Comprehensive metabolic panel   Result Value Ref Range    Glucose 78 74 - 98 mg/dL    BUN 15 7 - 20 mg/dL    Creatinine 0.80 0.60 - 1.30 mg/dL    eGFR Non African Am 79 >60 mL/min/1.73    eGFR African Am 96 >60 mL/min/1.73    BUN/Creatinine Ratio 18.8 7.1 - 23.5    Sodium 140 137 - 145 mmol/L    Potassium 4.1 3.5 - 5.1 mmol/L    Chloride 106 98 - 107 mmol/L    Total CO2 25.0 (L) 26.0 - 30.0 mmol/L    Calcium 9.5 8.4 - 10.2 mg/dL    Total Protein 6.6 6.3 - 8.2 g/dL    Albumin 3.90 3.50 - 5.00 g/dL    Globulin 2.7 gm/dL    A/G Ratio 1.4 1.0 - 2.0 g/dL    Total Bilirubin 1.5 (H) 0.2 - 1.3 mg/dL    Alkaline Phosphatase 40 38 - 126 U/L    AST (SGOT) 19 15 - 46 U/L    ALT (SGPT) 20 13 - 69 U/L   Lipid panel   Result Value Ref Range    Total Cholesterol 151 0 - 199 mg/dL    Triglycerides 54 <150 mg/dL    HDL Cholesterol 69 (H) 40 - 60 mg/dL    VLDL Cholesterol 10.8 mg/dL    LDL Cholesterol  71 0 - 99 mg/dL   Vitamin B12   Result Value Ref Range    Vitamin B-12 572 239 - 931 pg/mL   Vitamin D 25 hydroxy   Result Value Ref Range    25 Hydroxy, Vitamin D 20.9 ng/mL   TSH   Result Value Ref Range    TSH 3.140 0.470 - 4.680 mIU/mL   CBC and Differential   Result Value Ref Range    WBC 5.61 4.80 - 10.80 10*3/mm3    RBC 4.22 4.20 - 5.40 10*6/mm3    Hemoglobin 12.6 12.0 - 16.0 g/dL    Hematocrit 38.6 37.0 - 47.0 %    MCV 91.5 81.0 - 99.0 fL    MCH 29.9 27.0 - 31.0 pg    MCHC 32.6 30.0 - 37.0 g/dL    RDW 12.3 11.5 - 14.5 %    Platelets 210 130 - 400 10*3/mm3    Neutrophil Rel % 57.5 37.0 - 80.0 %    Lymphocyte Rel % 30.7 10.0 - 50.0 %    Monocyte Rel % 7.1 0.0 - 12.0 %    Eosinophil Rel % 3.4 0.0 - 7.0 %    Basophil Rel % 0.9 0.0 - 2.5 %    Neutrophils Absolute 3.23 2.00 - 6.90 10*3/mm3    Lymphocytes Absolute 1.72 0.60 - 3.40 10*3/mm3    Monocytes Absolute 0.40 0.00 - 0.90 10*3/mm3     Eosinophils Absolute 0.19 0.00 - 0.70 10*3/mm3    Basophils Absolute 0.05 0.00 - 0.20 10*3/mm3    Immature Granulocyte Rel % 0.4 0.0 - 0.6 %    Immature Grans Absolute 0.02 0.00 - 0.06 10*3/mm3    nRBC 0.0 0.0 - 0.0 /100 WBC         Assessment/Plan   Lucille was seen today for annual exam.    Diagnoses and all orders for this visit:    Routine general medical examination at a health care facility  -     Intrinsic Factor Ab      plan:  1. physical exam : conducted, will    reviewed fasting labs   Impression: health maintenance visit, healthy adult female. Currently, she eats a healthy diet. Colorectal cancer screening: colorectal cancer screening  Current , mammogram and pap smear current  . Screening lab work includes glucose, lipid profile and 25-hydroxyvitamin D. The risks and benefits of immunizations were discussed and immunizations are up to date. Advice and education were given regarding nutrition and aerobic exercise. Patient discussion: discussed with the patient            Paty Reinoso MD

## 2017-12-26 LAB — IF BLOCK AB SER-ACNC: 1.1 AU/ML (ref 0–1.1)

## 2017-12-29 NOTE — H&P (VIEW-ONLY)
[address] [place] [address]    (H) [phone]  (W) [phone]    Chief Complaint   Patient presents with   • Rectal Bleeding   • Rectal Pain     The patient states she is here for evaluation of painful bowel movements. She states she had a yeast infection approximately 3 months ago and this proceeded to cause her pain in her vaginal and rectal area. The patient states the pain had continued until last month when it started improving. She states she had taken Flagyl orally and used a topical cream and symptoms improved. Then last week the rectal and vaginal pain returned. The patient states she has had improvement currently.    The patient denies abdominal pain, nausea or vomiting. There is no history of constipation or diarrhea. The patient denies melena. She states that occasionally she may have bright red blood on the tissue. There is no history of melena. The patient has not had a colonoscopy in the past and there is no family history of colon cancer.    Rectal Bleeding   This is a chronic problem. The current episode started more than 1 month ago (December 2016). The problem occurs rarely. The problem has been gradually improving. Pertinent negatives include no abdominal pain, arthralgias, chest pain, chills, coughing, fatigue, fever, headaches, joint swelling, myalgias, nausea, rash, vertigo or vomiting. Nothing aggravates the symptoms. She has tried nothing for the symptoms.     Review of Systems   Constitutional: Negative for appetite change, chills, fatigue, fever and unexpected weight change.   HENT: Negative for mouth sores, nosebleeds and trouble swallowing.    Eyes: Negative for discharge and redness.   Respiratory: Negative for apnea, cough and shortness of breath.    Cardiovascular: Negative for chest pain, palpitations and leg swelling.   Gastrointestinal: Positive for hematochezia. Negative for abdominal distention, abdominal pain, anal bleeding, blood in stool, constipation,  diarrhea, nausea and vomiting.   Endocrine: Negative for cold intolerance, heat intolerance and polydipsia.   Genitourinary: Negative for dysuria, hematuria and urgency.   Musculoskeletal: Negative for arthralgias, joint swelling and myalgias.   Skin: Negative for rash.   Allergic/Immunologic: Negative for food allergies and immunocompromised state.   Neurological: Negative for dizziness, vertigo, seizures, syncope and headaches.   Hematological: Negative for adenopathy. Does not bruise/bleed easily.   Psychiatric/Behavioral: Negative for dysphoric mood. The patient is not nervous/anxious and is not hyperactive.      Patient Active Problem List   Diagnosis   • Anal or rectal pain   • Bright red blood per rectum   • Back pain   • Hypercalcemia   • Hypothyroidism   • Neuralgia   • Pain in thoracic spine   • Cobalamin deficiency   • Vitamin D deficiency   • Colon cancer screening     Past Medical History:   Diagnosis Date   • B12 deficiency 2015   • Hyperlipidemia    • Hypothyroidism 2015   • Restless leg syndrome 1995   • Snores      History reviewed. No pertinent surgical history.  Family History   Problem Relation Age of Onset   • Lung cancer Father    • Other Other      arteriosclerotic cardiovascular disease, cerebrovascular accident   • Breast cancer Other    • Colon cancer Neg Hx      Social History   Substance Use Topics   • Smoking status: Never Smoker   • Smokeless tobacco: Never Used   • Alcohol use Yes      Comment: unknown       Current Outpatient Prescriptions:   •  ARMOUR THYROID 15 MG tablet, TAKE 1 TABLET DAILY, Disp: 90 tablet, Rfl: 1  •  Cats Claw, Uncaria tomentosa, (CATS CLAW PO), Take  by mouth., Disp: , Rfl:   •  OLIVE LEAF EXTRACT PO, Take  by mouth., Disp: , Rfl:   •  vitamin B-12 (CYANOCOBALAMIN) 1000 MCG tablet, Take 1,000 mcg by mouth daily., Disp: , Rfl:   •  BLACK WALNUT POLLEN SC, Inject  under the skin., Disp: , Rfl:   •  cholecalciferol (VITAMIN D3) 1000 UNITS tablet, Take 1,000 Units  "by mouth daily., Disp: , Rfl:   •  clindamycin (CLEOCIN) 2 % vaginal cream, insert 1 applicatorful vaginally AT BEDTIME  FOR 5 DAYS, Disp: , Rfl: 1  •  clotrimazole-betamethasone (LOTRISONE) 1-0.05 % cream, , Disp: , Rfl: 0  •  metroNIDAZOLE (FLAGYL) 500 MG tablet, , Disp: , Rfl: 0  •  polycarbophil 625 MG tablet tablet, Take  by mouth Daily., Disp: , Rfl:   •  valACYclovir (VALTREX) 500 MG tablet, TAKE 1 TABLET BY MOUTH TWO TIMES A DAY, Disp: , Rfl: 1    Current Facility-Administered Medications:   •  cyanocobalamin injection 1,000 mcg, 1,000 mcg, Intramuscular, Q28 Days, Paty Reinoso MD, 1,000 mcg at 08/05/16 1407  •  cyanocobalamin injection 1,000 mcg, 1,000 mcg, Intramuscular, Q28 Days, Paty Reinoso MD, 1,000 mcg at 09/13/16 1647  No Known Allergies  /70  Pulse 75  Temp 99.5 °F (37.5 °C)  Resp 15  Ht 65\" (165.1 cm)  Wt 139 lb (63 kg)  LMP 03/06/2017 (Approximate)  BMI 23.13 kg/m2     Physical Exam   Constitutional: She is oriented to person, place, and time. She appears well-developed and well-nourished. No distress.   HENT:   Head: Normocephalic and atraumatic.   Right Ear: Hearing and external ear normal.   Left Ear: Hearing and external ear normal.   Nose: Nose normal.   Mouth/Throat: Oropharynx is clear and moist and mucous membranes are normal. Mucous membranes are not pale, not dry and not cyanotic. No oral lesions. No oropharyngeal exudate.   Eyes: Conjunctivae and EOM are normal. Right eye exhibits no discharge. Left eye exhibits no discharge.   Neck: Trachea normal. Neck supple. No JVD present. No edema present. No thyroid mass and no thyromegaly present.   Cardiovascular: Normal rate, regular rhythm, S2 normal and normal heart sounds.  Exam reveals no gallop, no S3 and no friction rub.    No murmur heard.  Pulmonary/Chest: Effort normal and breath sounds normal. No respiratory distress. She exhibits no tenderness.   Abdominal: Normal appearance and bowel sounds are normal. She exhibits " no distension, no ascites and no mass. There is no splenomegaly or hepatomegaly. There is no tenderness. There is no rigidity, no rebound and no guarding. No hernia.       Vascular Status -  Her exam exhibits no right foot edema. Her exam exhibits no left foot edema.  Lymphadenopathy:     She has no cervical adenopathy.        Left: No supraclavicular adenopathy present.   Neurological: She is alert and oriented to person, place, and time. She has normal strength. No cranial nerve deficit or sensory deficit.   Skin: No rash noted. She is not diaphoretic. No cyanosis. No pallor. Nails show no clubbing.   Psychiatric: She has a normal mood and affect.   Nursing note and vitals reviewed.    Laboratory Tests:    Upon review of records:    Dated 12/6/2016 glucose 86 BUN 13 creatinine 0.8 sodium 135 potassium 4.3 chloride 102 CO2 24 calcium 10.3 albumin 4.5 ALT 15 AST 18 alkaline phosphatase 47 total bilirubin 1.9 WBC 7.89 hemoglobin 13.4 hematocrit 40.3 platelet count 254 MCV 92.4 vitamin B12 608 vitamin D 29.4 TSH 3.148    Lucille was seen today for rectal bleeding and rectal pain.    Diagnoses and all orders for this visit:    Anal or rectal pain  Comments:  History of pain in rectum with bowel movements. Differentials include hemorrhoids, underlying colonic neoplastic disease.    Bright red blood per rectum  Comments:  Differentials include hemorrhoids, fissure, vascular ectasia.    Colon cancer screening  Comments:  No history of previous colonoscopy. No family history of colon cancer.        Plan   Patient Instructions   1. Obtain lab results from Dr. Verdugo's office.  2. High fiber diet with liberal water intake. Discussed in detail.  3. Colonoscopy: Description of the procedure, risks, benefits, alternatives and options, including nonoperative options, were discussed with the patient in detail. The patient understands and wishes to proceed.    Patient Care Team:  Paty Reinoso MD as PCP - General Karolyn FOLEY  Gurdeep, APRN   Patient/Caregiver provided printed discharge information.

## 2018-01-12 ENCOUNTER — HOSPITAL ENCOUNTER (OUTPATIENT)
Dept: MAMMOGRAPHY | Facility: HOSPITAL | Age: 43
Discharge: HOME | End: 2018-01-12
Admitting: OBSTETRICS & GYNECOLOGY

## 2018-01-12 DIAGNOSIS — Z12.39 SCREENING BREAST EXAMINATION: ICD-10-CM

## 2018-01-12 PROCEDURE — 77063 BREAST TOMOSYNTHESIS BI: CPT

## 2018-01-12 PROCEDURE — 77067 SCR MAMMO BI INCL CAD: CPT

## 2018-02-16 ENCOUNTER — CLINICAL SUPPORT (OUTPATIENT)
Dept: INTERNAL MEDICINE | Facility: CLINIC | Age: 43
End: 2018-02-16

## 2018-02-16 DIAGNOSIS — E53.8 B12 DEFICIENCY: ICD-10-CM

## 2018-02-16 PROCEDURE — 96372 THER/PROPH/DIAG INJ SC/IM: CPT | Performed by: INTERNAL MEDICINE

## 2018-02-16 RX ADMIN — CYANOCOBALAMIN 1000 MCG: 1000 INJECTION, SOLUTION INTRAMUSCULAR; SUBCUTANEOUS at 16:17

## 2018-03-05 ENCOUNTER — TELEPHONE (OUTPATIENT)
Dept: INTERNAL MEDICINE | Facility: CLINIC | Age: 43
End: 2018-03-05

## 2018-03-05 NOTE — TELEPHONE ENCOUNTER
PLEASE RETURN CALL. VITAMIN D RX HAS . PATIENT WOULD LIKE TO KNOW IF SHE NEEDS TO CONTINUE TO TAKE THIS OR NOT.

## 2018-03-07 NOTE — TELEPHONE ENCOUNTER
Informed patient per Dr. Reinoso that she does not need to be on an extended dose of 50,000 units of Vit. D. It is based on labs and given as a 2 month supply only. Patient voiced understanding.

## 2018-04-02 ENCOUNTER — CLINICAL SUPPORT (OUTPATIENT)
Dept: INTERNAL MEDICINE | Facility: CLINIC | Age: 43
End: 2018-04-02

## 2018-04-02 DIAGNOSIS — M79.2 NEURALGIA: ICD-10-CM

## 2018-04-02 PROCEDURE — 96372 THER/PROPH/DIAG INJ SC/IM: CPT | Performed by: INTERNAL MEDICINE

## 2018-04-02 RX ADMIN — CYANOCOBALAMIN 1000 MCG: 1000 INJECTION, SOLUTION INTRAMUSCULAR; SUBCUTANEOUS at 15:05

## 2018-06-18 ENCOUNTER — CLINICAL SUPPORT (OUTPATIENT)
Dept: INTERNAL MEDICINE | Facility: CLINIC | Age: 43
End: 2018-06-18

## 2018-06-18 DIAGNOSIS — E53.8 COBALAMIN DEFICIENCY: ICD-10-CM

## 2018-06-18 PROCEDURE — 96372 THER/PROPH/DIAG INJ SC/IM: CPT | Performed by: INTERNAL MEDICINE

## 2018-06-18 RX ADMIN — CYANOCOBALAMIN 1000 MCG: 1000 INJECTION, SOLUTION INTRAMUSCULAR; SUBCUTANEOUS at 16:44

## 2018-07-30 ENCOUNTER — CLINICAL SUPPORT (OUTPATIENT)
Dept: INTERNAL MEDICINE | Facility: CLINIC | Age: 43
End: 2018-07-30

## 2018-07-30 DIAGNOSIS — E53.8 COBALAMIN DEFICIENCY: ICD-10-CM

## 2018-07-30 PROCEDURE — 96372 THER/PROPH/DIAG INJ SC/IM: CPT | Performed by: INTERNAL MEDICINE

## 2018-07-30 RX ADMIN — CYANOCOBALAMIN 1000 MCG: 1000 INJECTION, SOLUTION INTRAMUSCULAR; SUBCUTANEOUS at 14:35

## 2018-09-27 ENCOUNTER — CLINICAL SUPPORT (OUTPATIENT)
Dept: INTERNAL MEDICINE | Facility: CLINIC | Age: 43
End: 2018-09-27

## 2018-09-27 DIAGNOSIS — E53.8 VITAMIN B12 DEFICIENCY: ICD-10-CM

## 2018-09-27 PROCEDURE — 96372 THER/PROPH/DIAG INJ SC/IM: CPT | Performed by: INTERNAL MEDICINE

## 2018-09-27 RX ADMIN — CYANOCOBALAMIN 1000 MCG: 1000 INJECTION, SOLUTION INTRAMUSCULAR; SUBCUTANEOUS at 16:50

## 2018-11-20 ENCOUNTER — CLINICAL SUPPORT (OUTPATIENT)
Dept: INTERNAL MEDICINE | Facility: CLINIC | Age: 43
End: 2018-11-20

## 2018-11-20 DIAGNOSIS — E53.8 COBALAMIN DEFICIENCY: ICD-10-CM

## 2018-11-20 PROCEDURE — 96372 THER/PROPH/DIAG INJ SC/IM: CPT | Performed by: INTERNAL MEDICINE

## 2018-11-20 RX ADMIN — CYANOCOBALAMIN 1000 MCG: 1000 INJECTION, SOLUTION INTRAMUSCULAR; SUBCUTANEOUS at 16:07

## 2019-01-11 ENCOUNTER — CLINICAL SUPPORT (OUTPATIENT)
Dept: INTERNAL MEDICINE | Facility: CLINIC | Age: 44
End: 2019-01-11

## 2019-01-11 DIAGNOSIS — E53.8 COBALAMIN DEFICIENCY: ICD-10-CM

## 2019-01-11 PROCEDURE — 96372 THER/PROPH/DIAG INJ SC/IM: CPT | Performed by: INTERNAL MEDICINE

## 2019-01-11 RX ADMIN — CYANOCOBALAMIN 1000 MCG: 1000 INJECTION, SOLUTION INTRAMUSCULAR; SUBCUTANEOUS at 16:05

## 2019-01-16 ENCOUNTER — TELEPHONE (OUTPATIENT)
Dept: INTERNAL MEDICINE | Facility: CLINIC | Age: 44
End: 2019-01-16

## 2019-01-16 DIAGNOSIS — Z00.00 ROUTINE GENERAL MEDICAL EXAMINATION AT A HEALTH CARE FACILITY: Primary | ICD-10-CM

## 2019-01-16 DIAGNOSIS — E78.00 PURE HYPERCHOLESTEROLEMIA: ICD-10-CM

## 2019-01-16 DIAGNOSIS — E55.9 VITAMIN D DEFICIENCY: ICD-10-CM

## 2019-01-16 DIAGNOSIS — E03.9 HYPOTHYROIDISM, UNSPECIFIED TYPE: ICD-10-CM

## 2019-01-16 DIAGNOSIS — I10 ESSENTIAL HYPERTENSION: ICD-10-CM

## 2019-01-16 DIAGNOSIS — E53.8 COBALAMIN DEFICIENCY: Primary | ICD-10-CM

## 2019-01-18 ENCOUNTER — APPOINTMENT (OUTPATIENT)
Dept: LAB | Facility: HOSPITAL | Age: 44
End: 2019-01-18

## 2019-01-18 LAB
25(OH)D3 SERPL-MCNC: 30.4 NG/ML
ALBUMIN SERPL-MCNC: 4.9 G/DL (ref 3.5–5)
ALBUMIN/GLOB SERPL: 1.7 G/DL (ref 1–2)
ALP SERPL-CCNC: 47 U/L (ref 38–126)
ALT SERPL W P-5'-P-CCNC: 26 U/L (ref 13–69)
ANION GAP SERPL CALCULATED.3IONS-SCNC: 12 MMOL/L (ref 10–20)
AST SERPL-CCNC: 23 U/L (ref 15–46)
BASOPHILS # BLD AUTO: 0.05 10*3/MM3 (ref 0–0.2)
BASOPHILS NFR BLD AUTO: 0.8 % (ref 0–2.5)
BILIRUB SERPL-MCNC: 1.8 MG/DL (ref 0.2–1.3)
BUN BLD-MCNC: 12 MG/DL (ref 7–20)
BUN/CREAT SERPL: 15 (ref 7.1–23.5)
CALCIUM SPEC-SCNC: 9.4 MG/DL (ref 8.4–10.2)
CHLORIDE SERPL-SCNC: 105 MMOL/L (ref 98–107)
CHOLEST SERPL-MCNC: 171 MG/DL (ref 0–199)
CO2 SERPL-SCNC: 27 MMOL/L (ref 26–30)
CREAT BLD-MCNC: 0.8 MG/DL (ref 0.6–1.3)
DEPRECATED RDW RBC AUTO: 41.4 FL (ref 37–54)
EOSINOPHIL # BLD AUTO: 0.21 10*3/MM3 (ref 0–0.7)
EOSINOPHIL NFR BLD AUTO: 3.2 % (ref 0–7)
ERYTHROCYTE [DISTWIDTH] IN BLOOD BY AUTOMATED COUNT: 12 % (ref 11.5–14.5)
GFR SERPL CREATININE-BSD FRML MDRD: 79 ML/MIN/1.73
GLOBULIN UR ELPH-MCNC: 2.9 GM/DL
GLUCOSE BLD-MCNC: 95 MG/DL (ref 74–98)
HBA1C MFR BLD: 5.3 % (ref 3–6)
HCT VFR BLD AUTO: 42.5 % (ref 37–47)
HDLC SERPL-MCNC: 81 MG/DL (ref 40–60)
HGB BLD-MCNC: 14.6 G/DL (ref 12–16)
IMM GRANULOCYTES # BLD AUTO: 0.02 10*3/MM3 (ref 0–0.06)
IMM GRANULOCYTES NFR BLD AUTO: 0.3 % (ref 0–0.6)
LDLC SERPL CALC-MCNC: 76 MG/DL (ref 0–99)
LDLC/HDLC SERPL: 0.93 {RATIO}
LYMPHOCYTES # BLD AUTO: 2.59 10*3/MM3 (ref 0.6–3.4)
LYMPHOCYTES NFR BLD AUTO: 39.7 % (ref 10–50)
MCH RBC QN AUTO: 32 PG (ref 27–31)
MCHC RBC AUTO-ENTMCNC: 34.4 G/DL (ref 30–37)
MCV RBC AUTO: 93.2 FL (ref 81–99)
MONOCYTES # BLD AUTO: 0.51 10*3/MM3 (ref 0–0.9)
MONOCYTES NFR BLD AUTO: 7.8 % (ref 0–12)
NEUTROPHILS # BLD AUTO: 3.15 10*3/MM3 (ref 2–6.9)
NEUTROPHILS NFR BLD AUTO: 48.2 % (ref 37–80)
NRBC BLD AUTO-RTO: 0 /100 WBC (ref 0–0)
PLATELET # BLD AUTO: 276 10*3/MM3 (ref 130–400)
PMV BLD AUTO: 9.7 FL (ref 6–12)
POTASSIUM BLD-SCNC: 4 MMOL/L (ref 3.5–5.1)
PROT SERPL-MCNC: 7.8 G/DL (ref 6.3–8.2)
RBC # BLD AUTO: 4.56 10*6/MM3 (ref 4.2–5.4)
SODIUM BLD-SCNC: 140 MMOL/L (ref 137–145)
TRIGL SERPL-MCNC: 72 MG/DL
TSH SERPL DL<=0.05 MIU/L-ACNC: 5.09 MIU/ML (ref 0.47–4.68)
VIT B12 BLD-MCNC: 689 PG/ML (ref 239–931)
VLDLC SERPL-MCNC: 14.4 MG/DL
WBC NRBC COR # BLD: 6.53 10*3/MM3 (ref 4.8–10.8)

## 2019-01-18 PROCEDURE — 83036 HEMOGLOBIN GLYCOSYLATED A1C: CPT | Performed by: INTERNAL MEDICINE

## 2019-01-18 PROCEDURE — 82607 VITAMIN B-12: CPT | Performed by: PHYSICIAN ASSISTANT

## 2019-01-18 PROCEDURE — 80061 LIPID PANEL: CPT | Performed by: PHYSICIAN ASSISTANT

## 2019-01-18 PROCEDURE — 84443 ASSAY THYROID STIM HORMONE: CPT | Performed by: PHYSICIAN ASSISTANT

## 2019-01-18 PROCEDURE — 82306 VITAMIN D 25 HYDROXY: CPT | Performed by: PHYSICIAN ASSISTANT

## 2019-01-18 PROCEDURE — 85025 COMPLETE CBC W/AUTO DIFF WBC: CPT | Performed by: PHYSICIAN ASSISTANT

## 2019-01-18 PROCEDURE — 36415 COLL VENOUS BLD VENIPUNCTURE: CPT | Performed by: INTERNAL MEDICINE

## 2019-01-18 PROCEDURE — 80053 COMPREHEN METABOLIC PANEL: CPT | Performed by: PHYSICIAN ASSISTANT

## 2019-01-22 ENCOUNTER — OFFICE VISIT (OUTPATIENT)
Dept: INTERNAL MEDICINE | Facility: CLINIC | Age: 44
End: 2019-01-22

## 2019-01-22 VITALS
RESPIRATION RATE: 16 BRPM | DIASTOLIC BLOOD PRESSURE: 77 MMHG | SYSTOLIC BLOOD PRESSURE: 120 MMHG | TEMPERATURE: 98.3 F | OXYGEN SATURATION: 100 % | HEART RATE: 78 BPM | WEIGHT: 138 LBS | BODY MASS INDEX: 22.96 KG/M2

## 2019-01-22 DIAGNOSIS — E03.8 ADULT ONSET HYPOTHYROIDISM: ICD-10-CM

## 2019-01-22 DIAGNOSIS — Z00.00 ROUTINE GENERAL MEDICAL EXAMINATION AT A HEALTH CARE FACILITY: Primary | ICD-10-CM

## 2019-01-22 DIAGNOSIS — N76.1 SUBACUTE VAGINITIS: ICD-10-CM

## 2019-01-22 PROCEDURE — 99396 PREV VISIT EST AGE 40-64: CPT | Performed by: INTERNAL MEDICINE

## 2019-01-22 RX ORDER — LEVOTHYROXINE SODIUM 0.03 MG/1
25 TABLET ORAL DAILY
Qty: 30 TABLET | Refills: 0 | Status: SHIPPED | OUTPATIENT
Start: 2019-01-22 | End: 2019-02-18 | Stop reason: SDUPTHER

## 2019-01-22 RX ORDER — METRONIDAZOLE 500 MG/1
500 TABLET ORAL 2 TIMES DAILY
Qty: 14 TABLET | Refills: 0 | Status: SHIPPED | OUTPATIENT
Start: 2019-01-22 | End: 2019-01-29

## 2019-01-22 NOTE — PROGRESS NOTES
Subjective   Lucille Linder is a 43 y.o. female.     Chief Complaint   Patient presents with   • Annual Exam   • Hypothyroidism       History of Present Illness    patient is here for a physical exam and also had labs for cholesterol and thyroid  She is on armour thyroid per dr montiel , she also complains of vaginal discharge and saw dr montiel and he gave her amoxicillin and was told she has bacterial vaginosis  And she has had several episodes since then - 9- 2018    Review of Systems   Constitutional: Negative for appetite change, fatigue and fever.   HENT: Negative for congestion, ear discharge, ear pain, sinus pressure and sore throat.    Eyes: Negative for pain and discharge.   Respiratory: Negative for cough, chest tightness, shortness of breath and wheezing.    Cardiovascular: Negative for chest pain, palpitations and leg swelling.   Gastrointestinal: Negative for abdominal pain, blood in stool, constipation, diarrhea and nausea.   Endocrine: Negative for cold intolerance and heat intolerance.   Genitourinary: Positive for vaginal discharge. Negative for dysuria, flank pain and frequency.   Musculoskeletal: Negative for back pain and joint swelling.   Skin: Negative for color change.   Allergic/Immunologic: Negative for environmental allergies and food allergies.   Neurological: Negative for dizziness, weakness, numbness and headaches.   Hematological: Negative for adenopathy. Does not bruise/bleed easily.   Psychiatric/Behavioral: Negative for behavioral problems and dysphoric mood. The patient is not nervous/anxious.        Past Medical History:   Diagnosis Date   • B12 deficiency 2015   • Hyperlipidemia    • Hypothyroidism 2015   • Restless leg syndrome 1995   • Snores        Past Surgical History:   Procedure Laterality Date   • COLONOSCOPY N/A 4/4/2017    Procedure: COLONOSCOPY WITH ANOSCOPY;  Surgeon: Cezar Greenberg MD;  Location: [facility] ENDOSCOPY;  Service:        Family History   Problem  Relation Age of Onset   • Lung cancer Father    • Other Other         arteriosclerotic cardiovascular disease, cerebrovascular accident   • Breast cancer Other    • Breast cancer Paternal Grandmother    • Colon cancer Neg Hx         reports that  has never smoked. she has never used smokeless tobacco. She reports that she drinks alcohol. She reports that she does not use drugs.    No Known Allergies        Current Outpatient Medications:   •  levothyroxine (SYNTHROID, LEVOTHROID) 25 MCG tablet, Take 1 tablet by mouth Daily for 30 days., Disp: 30 tablet, Rfl: 0  •  metroNIDAZOLE (FLAGYL) 500 MG tablet, Take 1 tablet by mouth 2 (Two) Times a Day for 7 days., Disp: 14 tablet, Rfl: 0    Current Facility-Administered Medications:   •  cyanocobalamin injection 1,000 mcg, 1,000 mcg, Intramuscular, Q28 Days, Paty Reinoso MD, 1,000 mcg at 07/07/17 1117  •  cyanocobalamin injection 1,000 mcg, 1,000 mcg, Intramuscular, Q28 Days, Paty Reinoso MD, 1,000 mcg at 01/11/19 1605  •  cyanocobalamin injection 1,000 mcg, 1,000 mcg, Intramuscular, Q28 Days, Paty Reinoso MD  •  cyanocobalamin injection 1,000 mcg, 1,000 mcg, Intramuscular, Q28 Days, Paty Reinoso MD, 1,000 mcg at 11/20/18 1607      Objective   Blood pressure 120/77, pulse 78, temperature 98.3 °F (36.8 °C), temperature source Temporal, resp. rate 16, weight 62.6 kg (138 lb), SpO2 100 %.    Physical Exam   Constitutional: She is oriented to person, place, and time. She appears well-developed and well-nourished. No distress.   HENT:   Head: Normocephalic and atraumatic.   Right Ear: External ear normal.   Left Ear: External ear normal.   Nose: Nose normal.   Mouth/Throat: Oropharynx is clear and moist.   Eyes: Conjunctivae and EOM are normal. Pupils are equal, round, and reactive to light.   Neck: Neck supple. No thyromegaly present.   Cardiovascular: Normal rate, regular rhythm and normal heart sounds.   Pulmonary/Chest: Effort normal and breath sounds normal. No  respiratory distress.   Abdominal: Soft. Bowel sounds are normal. She exhibits no distension. There is no tenderness. There is no rebound.   Musculoskeletal: Normal range of motion. She exhibits no edema.   Lymphadenopathy:     She has no cervical adenopathy.   Neurological: She is alert and oriented to person, place, and time.   No gross motor or sensory deficits   Skin: Skin is warm. She is not diaphoretic.   Psychiatric: She has a normal mood and affect.   Nursing note and vitals reviewed.        Results for orders placed or performed in visit on 01/16/19   Comprehensive Metabolic Panel   Result Value Ref Range    Glucose 95 74 - 98 mg/dL    BUN 12 7 - 20 mg/dL    Creatinine 0.80 0.60 - 1.30 mg/dL    Sodium 140 137 - 145 mmol/L    Potassium 4.0 3.5 - 5.1 mmol/L    Chloride 105 98 - 107 mmol/L    CO2 27.0 26.0 - 30.0 mmol/L    Calcium 9.4 8.4 - 10.2 mg/dL    Total Protein 7.8 6.3 - 8.2 g/dL    Albumin 4.90 3.50 - 5.00 g/dL    ALT (SGPT) 26 13 - 69 U/L    AST (SGOT) 23 15 - 46 U/L    Alkaline Phosphatase 47 38 - 126 U/L    Total Bilirubin 1.8 (H) 0.2 - 1.3 mg/dL    eGFR Non African Amer 79 >60 mL/min/1.73    Globulin 2.9 gm/dL    A/G Ratio 1.7 1.0 - 2.0 g/dL    BUN/Creatinine Ratio 15.0 7.1 - 23.5    Anion Gap 12.0 10.0 - 20.0 mmol/L   Lipid Panel   Result Value Ref Range    Total Cholesterol 171 0 - 199 mg/dL    Triglycerides 72 <150 mg/dL    HDL Cholesterol 81 (H) 40 - 60 mg/dL    LDL Cholesterol  76 0 - 99 mg/dL    VLDL Cholesterol 14.4 mg/dL    LDL/HDL Ratio 0.93    Vitamin B12   Result Value Ref Range    Vitamin B-12 689 239 - 931 pg/mL   Vitamin D 25 Hydroxy   Result Value Ref Range    25 Hydroxy, Vitamin D 30.4 ng/ml   CBC Auto Differential   Result Value Ref Range    WBC 6.53 4.80 - 10.80 10*3/mm3    RBC 4.56 4.20 - 5.40 10*6/mm3    Hemoglobin 14.6 12.0 - 16.0 g/dL    Hematocrit 42.5 37.0 - 47.0 %    MCV 93.2 81.0 - 99.0 fL    MCH 32.0 (H) 27.0 - 31.0 pg    MCHC 34.4 30.0 - 37.0 g/dL    RDW 12.0 11.5 - 14.5  %    RDW-SD 41.4 37.0 - 54.0 fl    MPV 9.7 6.0 - 12.0 fL    Platelets 276 130 - 400 10*3/mm3    Neutrophil % 48.2 37.0 - 80.0 %    Lymphocyte % 39.7 10.0 - 50.0 %    Monocyte % 7.8 0.0 - 12.0 %    Eosinophil % 3.2 0.0 - 7.0 %    Basophil % 0.8 0.0 - 2.5 %    Immature Grans % 0.3 0.0 - 0.6 %    Neutrophils, Absolute 3.15 2.00 - 6.90 10*3/mm3    Lymphocytes, Absolute 2.59 0.60 - 3.40 10*3/mm3    Monocytes, Absolute 0.51 0.00 - 0.90 10*3/mm3    Eosinophils, Absolute 0.21 0.00 - 0.70 10*3/mm3    Basophils, Absolute 0.05 0.00 - 0.20 10*3/mm3    Immature Grans, Absolute 0.02 0.00 - 0.06 10*3/mm3    nRBC 0.0 0.0 - 0.0 /100 WBC         Assessment/Plan   Lucille was seen today for annual exam and hypothyroidism.    Diagnoses and all orders for this visit:    Routine general medical examination at a health care facility    Adult onset hypothyroidism  -     TSH    Subacute vaginitis  -     Ambulatory Referral to Gynecology    Other orders  -     metroNIDAZOLE (FLAGYL) 500 MG tablet; Take 1 tablet by mouth 2 (Two) Times a Day for 7 days.  -     levothyroxine (SYNTHROID, LEVOTHROID) 25 MCG tablet; Take 1 tablet by mouth Daily for 30 days.      plan:  1.physical: Physical exam conducted today,   Reviewed fasting lab work,   Impression: healthy adult Patient. Currently, patient eats a healthy diet. Screening lab work includes glucose, lipid profile , complete blood count and comprehensive panel . The risks and benefits of immunizations were discussed and immunizations are up to date. Advice and education were given regarding nutrition and aerobic exercise. Patient discussion: discussed with the patient.  Physical with preventive exam as well as age and risk appropriate counseling completed.   Patient Discussion: plan discussed with the patient, follow-up visit needed in one year. Medication Review and medication list updated  2. hypothyroidism:  reviewed  tsh , and  Start  Levothyroxine 25 mcg po qd  3. Vaginitis :  Start flagyl ,  refer to gyn             Paty Reinoso MD

## 2019-02-13 ENCOUNTER — OFFICE VISIT (OUTPATIENT)
Dept: OBSTETRICS AND GYNECOLOGY | Facility: CLINIC | Age: 44
End: 2019-02-13

## 2019-02-13 ENCOUNTER — OFFICE VISIT (OUTPATIENT)
Dept: INTERNAL MEDICINE | Facility: CLINIC | Age: 44
End: 2019-02-13

## 2019-02-13 VITALS
BODY MASS INDEX: 22.66 KG/M2 | DIASTOLIC BLOOD PRESSURE: 60 MMHG | HEIGHT: 65 IN | SYSTOLIC BLOOD PRESSURE: 126 MMHG | WEIGHT: 136 LBS

## 2019-02-13 VITALS
DIASTOLIC BLOOD PRESSURE: 49 MMHG | OXYGEN SATURATION: 100 % | BODY MASS INDEX: 22.82 KG/M2 | WEIGHT: 137 LBS | HEIGHT: 65 IN | RESPIRATION RATE: 16 BRPM | TEMPERATURE: 98.4 F | HEART RATE: 73 BPM | SYSTOLIC BLOOD PRESSURE: 129 MMHG

## 2019-02-13 DIAGNOSIS — N39.0 ACUTE URINARY TRACT INFECTION: Primary | ICD-10-CM

## 2019-02-13 DIAGNOSIS — R11.0 NAUSEA: ICD-10-CM

## 2019-02-13 DIAGNOSIS — M54.50 LUMBOSACRAL PAIN: ICD-10-CM

## 2019-02-13 DIAGNOSIS — R10.819 RIGHT FLANK TENDERNESS: ICD-10-CM

## 2019-02-13 DIAGNOSIS — Z12.31 ENCOUNTER FOR SCREENING MAMMOGRAM FOR BREAST CANCER: ICD-10-CM

## 2019-02-13 DIAGNOSIS — N76.0 ACUTE VAGINITIS: Primary | ICD-10-CM

## 2019-02-13 LAB
BILIRUB BLD-MCNC: NEGATIVE MG/DL
CLARITY, POC: CLEAR
COLOR UR: YELLOW
GLUCOSE UR STRIP-MCNC: NEGATIVE MG/DL
KETONES UR QL: NEGATIVE
LEUKOCYTE EST, POC: NEGATIVE
NITRITE UR-MCNC: NEGATIVE MG/ML
PH UR: 6.5 [PH] (ref 5–8)
PROT UR STRIP-MCNC: NEGATIVE MG/DL
RBC # UR STRIP: NEGATIVE /UL
SP GR UR: 1 (ref 1–1.03)
UROBILINOGEN UR QL: NORMAL

## 2019-02-13 PROCEDURE — 81003 URINALYSIS AUTO W/O SCOPE: CPT | Performed by: INTERNAL MEDICINE

## 2019-02-13 PROCEDURE — 99214 OFFICE O/P EST MOD 30 MIN: CPT | Performed by: INTERNAL MEDICINE

## 2019-02-13 PROCEDURE — 99204 OFFICE O/P NEW MOD 45 MIN: CPT | Performed by: OBSTETRICS & GYNECOLOGY

## 2019-02-13 PROCEDURE — 96372 THER/PROPH/DIAG INJ SC/IM: CPT | Performed by: INTERNAL MEDICINE

## 2019-02-13 RX ORDER — METRONIDAZOLE 500 MG/1
500 TABLET ORAL 2 TIMES DAILY
Qty: 14 TABLET | Refills: 0 | Status: SHIPPED | OUTPATIENT
Start: 2019-02-13 | End: 2019-02-20

## 2019-02-13 RX ORDER — KETOROLAC TROMETHAMINE 30 MG/ML
30 INJECTION, SOLUTION INTRAMUSCULAR; INTRAVENOUS ONCE
Status: COMPLETED | OUTPATIENT
Start: 2019-02-13 | End: 2019-02-13

## 2019-02-13 RX ORDER — NITROFURANTOIN 25; 75 MG/1; MG/1
100 CAPSULE ORAL 2 TIMES DAILY
Qty: 20 CAPSULE | Refills: 0 | Status: SHIPPED | OUTPATIENT
Start: 2019-02-13 | End: 2019-03-27

## 2019-02-13 RX ADMIN — KETOROLAC TROMETHAMINE 30 MG: 30 INJECTION, SOLUTION INTRAMUSCULAR; INTRAVENOUS at 11:52

## 2019-02-13 NOTE — PROGRESS NOTES
Subjective   Lucille Linder is a 43 y.o. female.     Chief Complaint   Patient presents with   • Back Pain     Pain started Friday       History of Present Illness   Patient is here complaining of lower lumbar pain and right flank pain with started a few days ago.  She states yesterday the pain had worsened and she was also nauseous today.  She states the nausea has improved.  She also complains of occasional tension headache and muscle spasm in the upper part of her back    The following portions of the patient's history were reviewed and updated as appropriate: allergies, current medications, past family history, past medical history, past social history, past surgical history and problem list.    Review of Systems   Constitutional: Negative for appetite change, fatigue and fever.   HENT: Negative for congestion, ear discharge, ear pain, sinus pressure and sore throat.    Eyes: Negative for pain and discharge.   Respiratory: Negative for cough, chest tightness, shortness of breath and wheezing.    Cardiovascular: Negative for chest pain, palpitations and leg swelling.   Gastrointestinal: Positive for nausea. Negative for abdominal pain, blood in stool, constipation and diarrhea.   Endocrine: Negative for cold intolerance and heat intolerance.   Genitourinary: Positive for flank pain. Negative for dysuria and frequency.   Musculoskeletal: Positive for back pain. Negative for joint swelling.   Skin: Negative for color change.   Allergic/Immunologic: Negative for environmental allergies and food allergies.   Neurological: Negative for dizziness, weakness, numbness and headaches.   Hematological: Negative for adenopathy. Does not bruise/bleed easily.   Psychiatric/Behavioral: Negative for behavioral problems and dysphoric mood. The patient is not nervous/anxious.          Current Outpatient Medications:   •  levothyroxine (SYNTHROID, LEVOTHROID) 25 MCG tablet, Take 1 tablet by mouth Daily for 30 days.,  "Disp: 30 tablet, Rfl: 0  •  nitrofurantoin, macrocrystal-monohydrate, (MACROBID) 100 MG capsule, Take 1 capsule by mouth 2 (Two) Times a Day., Disp: 20 capsule, Rfl: 0    Current Facility-Administered Medications:   •  cyanocobalamin injection 1,000 mcg, 1,000 mcg, Intramuscular, Q28 Days, Paty Reinoso MD, 1,000 mcg at 07/07/17 1117  •  cyanocobalamin injection 1,000 mcg, 1,000 mcg, Intramuscular, Q28 Days, Paty Reinoso MD, 1,000 mcg at 01/11/19 1605  •  cyanocobalamin injection 1,000 mcg, 1,000 mcg, Intramuscular, Q28 Days, Paty Reinoso MD  •  cyanocobalamin injection 1,000 mcg, 1,000 mcg, Intramuscular, Q28 Days, Paty Reinoso MD, 1,000 mcg at 11/20/18 1607    Objective     Blood pressure 129/49, pulse 73, temperature 98.4 °F (36.9 °C), resp. rate 16, height 165.1 cm (65\"), weight 62.1 kg (137 lb), SpO2 100 %.    Physical Exam   Constitutional: She is oriented to person, place, and time. She appears well-developed and well-nourished. No distress.   HENT:   Head: Normocephalic and atraumatic.   Right Ear: External ear normal.   Left Ear: External ear normal.   Nose: Nose normal.   Mouth/Throat: Oropharynx is clear and moist.   Eyes: Conjunctivae and EOM are normal. Pupils are equal, round, and reactive to light.   Neck: Neck supple. No thyromegaly present.   Cardiovascular: Normal rate, regular rhythm and normal heart sounds.   Pulmonary/Chest: Effort normal and breath sounds normal. No respiratory distress.   Abdominal: Soft. Bowel sounds are normal. She exhibits no distension. There is tenderness. There is no rebound.   Right flank  Suprapubic discomfort   Musculoskeletal: Normal range of motion. She exhibits tenderness. She exhibits no edema.   lumbar   Lymphadenopathy:     She has no cervical adenopathy.   Neurological: She is alert and oriented to person, place, and time.   No gross motor or sensory deficits   Skin: Skin is warm. She is not diaphoretic.   Psychiatric: She has a normal mood and affect. "   Nursing note and vitals reviewed.    Patient's Body mass index is 22.8 kg/m².      Results for orders placed or performed in visit on 02/13/19   POC Urinalysis Dipstick, Automated   Result Value Ref Range    Color Yellow Yellow, Straw, Dark Yellow, Kristen    Clarity, UA Clear Clear    Specific Gravity  1.005 (A) 1.005 - 1.030    pH, Urine 6.5 5.0 - 8.0    Leukocytes Negative Negative    Nitrite, UA Negative Negative    Protein, POC Negative Negative mg/dL    Glucose, UA Negative Negative, 1000 mg/dL (3+) mg/dL    Ketones, UA Negative Negative    Urobilinogen, UA Normal Normal    Bilirubin Negative Negative    Blood, UA Negative Negative         Assessment/Plan   Lucille was seen today for back pain.    Diagnoses and all orders for this visit:    Acute urinary tract infection  -     POC Urinalysis Dipstick, Automated    Lumbosacral pain  -     Urine Culture - Urine, Urine, Clean Catch  -     ketorolac (TORADOL) injection 30 mg; Inject 1 mL into the appropriate muscle as directed by prescriber 1 (One) Time.    Nausea    Right flank tenderness    Other orders  -     nitrofurantoin, macrocrystal-monohydrate, (MACROBID) 100 MG capsule; Take 1 capsule by mouth 2 (Two) Times a Day.    plan:  1.  Acute urinary tract infection  : In office urine analysis done ,will order urine culture ,oral hydration advised, will start oral antibiotics   2.  Lumbar pain: I m Toradol given in office today,  3. Right flank pain :   I m Toradol given in office today,  4.  Nausea: We will monitor       Paty Reinoso MD

## 2019-02-13 NOTE — PROGRESS NOTES
Subjective  Chief Complaint   Patient presents with   • Vaginitis     Patient complains of recurrent baterial vaginosis and yeast infections.      Patient is 43 y.o.  here for evaluation of recurrent bacterial vaginosis and yeast infections.  Patient gives a history of increasing infections over the last year.  Patient reports having multiple episodes of a watery discharge with a foul odor.  Patient reports at other times having a thick white discharge with itching and irritation.  Patient has tried numerous over-the-counter medications.  Patient is also been on probiotics in the past with no improvement.  Patient reports being seen by her primary care physician today.  Patient has complaints of low back pain.  Patient denies any dysuria, fever, chills, or nausea.  Patient was given a prescription for Macrobid from her  primary care provider.  Patient has seen Dr. Anson Verdugo for evaluation of her symptoms.  Patient reports she has been on numerous herbal medications with no relief.  Patient has ParaGard IUD.  This was inserted in 2013.  She reports her last Pap smear was 1-2 years ago.  Patient is due for her mammogram now.  Patient desires to come here for her GYN care.    History  Past Medical History:   Diagnosis Date   • B12 deficiency    • Encounter for insertion of ParaGard IUD 2013   • Hyperlipidemia    • Hypothyroidism    • Restless leg syndrome    • Snores    • Urinary tract infection      Current Outpatient Medications on File Prior to Visit   Medication Sig Dispense Refill   • levothyroxine (SYNTHROID, LEVOTHROID) 25 MCG tablet Take 1 tablet by mouth Daily for 30 days. 30 tablet 0     Current Facility-Administered Medications on File Prior to Visit   Medication Dose Route Frequency Provider Last Rate Last Dose   • cyanocobalamin injection 1,000 mcg  1,000 mcg Intramuscular Q28 Days Paty Reinoso MD   1,000 mcg at 17 1117   • cyanocobalamin injection 1,000 mcg  1,000  "mcg Intramuscular Q28 Days Paty Reinoso MD   1,000 mcg at 01/11/19 1605   • cyanocobalamin injection 1,000 mcg  1,000 mcg Intramuscular Q28 Days Paty Reinoso MD       • cyanocobalamin injection 1,000 mcg  1,000 mcg Intramuscular Q28 Days Paty Reinoso MD   1,000 mcg at 11/20/18 1607     No Known Allergies  Past Surgical History:   Procedure Laterality Date   • COLONOSCOPY N/A 4/4/2017    Procedure: COLONOSCOPY WITH ANOSCOPY;  Surgeon: Cezar Greenberg MD;  Location: [facility];  Service:      Family History   Problem Relation Age of Onset   • Lung cancer Father    • Other Other         arteriosclerotic cardiovascular disease, cerebrovascular accident   • Breast cancer Other    • Breast cancer Paternal Grandmother    • Thyroid disease Sister    • Colon cancer Neg Hx      Social History     Socioeconomic History   • Marital status:      Spouse name: Not on file   • Number of children: Not on file   • Years of education: Not on file   • Highest education level: Not on file   Tobacco Use   • Smoking status: Never Smoker   • Smokeless tobacco: Never Used   Substance and Sexual Activity   • Alcohol use: Yes     Comment: OCCASSIONALLY-1 DRINK PER DAY.  USUALLY BEER   • Drug use: No   • Sexual activity: Yes     Partners: Male     Birth control/protection: IUD     Review of Systems  All systems were reviewed and negative except for:  Genitourinary: postivie for  pelvic pain, recurrent yeast infections and vaginal discharge     Objective  Vitals:    02/13/19 1516   BP: 126/60   Weight: 61.7 kg (136 lb)   Height: 165.1 cm (65\")     Physical Exam:  General Appearance: alert, appears stated age and cooperative  Head: normocephalic, without obvious abnormality and atraumatic  Eyes: lids and lashes normal, conjunctivae and sclerae normal, no icterus, no pallor, corneas clear and PERRLA  Ears: ears appear intact with no abnormalities noted  Nose: nares normal, septum midline, mucosa normal and no drainage  Neck: " suppple, trachea midline and no thyromegaly  Lungs: clear to auscultation, respirations regular, respirations even and respirations unlabored  Heart: regular rhythm and normal rate, normal S1, S2, no murmur, gallop, or rubs and no click  Breasts: Not performed.  Abdomen: normal bowel sounds, no masses, no hepatomegaly, no splenomegaly, soft non-tender, no guarding and no rebound tenderness  Pelvic: Clinical staff was present for exam  External genitalia:  normal appearance of the external genitalia including Bartholin's and [place]'s glands.  :  urethral meatus normal;  Vaginal:  normal pink mucosa without prolapse or lesions. discharge present -  mucousy and +odor;  Cervix:  normal appearance. IUD string present - 3 cms in length;  Uterus:  normal size, shape and consistency.  Adnexa:  normal bimanual exam of the adnexa.  Extremities: moves extremities well, no edema, no cyanosis and no redness  Skin: no bleeding, bruising or rash and no lesions noted  Lymph Nodes: no palpable adenopathy  Neuro: CN II-X grossly intact; sensation intact  Psych: normal mood and affect, oriented to person, time and place, thought content organized and appropriate judgment  Lab Review   UA -negative 2/13/2019    Imaging   No data reviewed    Assessment/Plan  Problem List Items Addressed This Visit     None      Visit Diagnoses     Acute vaginitis    -  Primary  Patient with a long history of frequent vaginal infections.  Patient with findings today consistent with bacterial vaginosis.  A prescription is given for Flagyl as noted.  Instructions and precautions have been given.  Cultures have been obtained.  Patient is also instructed to resume probiotics on a regular basis.  Instructions and precautions have been given.  Patient is to follow-up if no improvement with her symptoms.  Otherwise patient will follow up for her annual examination.  Plan pending results.    Relevant Medications    metroNIDAZOLE (FLAGYL) 500 MG tablet    Other  Relevant Orders    NuSwab VG+ - Swab, Vagina    Encounter for screening mammogram for breast cancer      It is recommended per ACOG, for women at average risk to start annual mammogram screening at the age of 40 until the age of 75 and an individualized decision be made for women after age 75.  She was encouraged to continue getting yearly mammograms.  She should report any palpable breast lump(s) or skin changes regardless of mammographic findings.  I explained to Lucille that notification regarding her mammogram results will come from the center performing the study.  Our office will not be routinely calling with mammogram results.  It is her responsibility to make sure that the results from the mammogram are communicated to her by the breast center.  If she has any questions about the results, she is welcome to call our office anytime.    Lucille was counseled regarding having clinical breast exams and breast self-awareness.  Women aged 29-39 years of age should have clinical breast exams every 1-3 years and yearly aged 40 and older.  The patient was counseled regarding breast self-awareness focusing on having a sense of what is normal for her breasts so that she can tell if there are changes.  Even small changes should be reported to provider.    Relevant Orders    Mammo Screening Digital Tomosynthesis Bilateral With CAD        Follow up as discussed/scheduled  This note was electronically signed.  Paty Licea M.D.

## 2019-02-15 LAB
BACTERIA UR CULT: NO GROWTH
BACTERIA UR CULT: NORMAL
TSH SERPL DL<=0.005 MIU/L-ACNC: 2.18 MIU/ML (ref 0.47–4.68)

## 2019-02-16 LAB
BACTERIA UR CULT: NORMAL
BACTERIA UR CULT: NORMAL

## 2019-02-18 ENCOUNTER — TELEPHONE (OUTPATIENT)
Dept: INTERNAL MEDICINE | Facility: CLINIC | Age: 44
End: 2019-02-18

## 2019-02-18 DIAGNOSIS — M54.50 LUMBOSACRAL PAIN: ICD-10-CM

## 2019-02-18 DIAGNOSIS — G89.29 CHRONIC BILATERAL THORACIC BACK PAIN: Primary | ICD-10-CM

## 2019-02-18 DIAGNOSIS — M54.6 CHRONIC BILATERAL THORACIC BACK PAIN: Primary | ICD-10-CM

## 2019-02-18 RX ORDER — LEVOTHYROXINE SODIUM 0.03 MG/1
25 TABLET ORAL DAILY
Qty: 90 TABLET | Refills: 1 | Status: SHIPPED | OUTPATIENT
Start: 2019-02-18 | End: 2019-08-19 | Stop reason: SDUPTHER

## 2019-02-20 LAB
A VAGINAE DNA VAG QL NAA+PROBE: ABNORMAL SCORE
BVAB2 DNA VAG QL NAA+PROBE: ABNORMAL SCORE
C ALBICANS DNA VAG QL NAA+PROBE: POSITIVE
C GLABRATA DNA VAG QL NAA+PROBE: NEGATIVE
C TRACH RRNA SPEC QL NAA+PROBE: NEGATIVE
MEGA1 DNA VAG QL NAA+PROBE: ABNORMAL SCORE
N GONORRHOEA RRNA SPEC QL NAA+PROBE: NEGATIVE
T VAGINALIS RRNA SPEC QL NAA+PROBE: NEGATIVE

## 2019-02-22 ENCOUNTER — TELEPHONE (OUTPATIENT)
Dept: OBSTETRICS AND GYNECOLOGY | Facility: CLINIC | Age: 44
End: 2019-02-22

## 2019-02-22 RX ORDER — FLUCONAZOLE 150 MG/1
TABLET ORAL
Qty: 2 TABLET | Refills: 0 | Status: SHIPPED | OUTPATIENT
Start: 2019-02-22 | End: 2019-03-27

## 2019-02-22 NOTE — TELEPHONE ENCOUNTER
----- Message from aPty Licea MD sent at 2/22/2019  2:34 PM EST -----  Okay to inform pt culture positive for yeast.  Pt needs Rx Diflucan 150 mg po now and may repeat in 3 d

## 2019-03-18 ENCOUNTER — TREATMENT (OUTPATIENT)
Dept: PHYSICAL THERAPY | Facility: CLINIC | Age: 44
End: 2019-03-18

## 2019-03-18 DIAGNOSIS — M54.6 CHRONIC BILATERAL THORACIC BACK PAIN: ICD-10-CM

## 2019-03-18 DIAGNOSIS — G54.0 THORACIC OUTLET SYNDROME: ICD-10-CM

## 2019-03-18 DIAGNOSIS — G89.29 CHRONIC BILATERAL LOW BACK PAIN WITHOUT SCIATICA: Primary | ICD-10-CM

## 2019-03-18 DIAGNOSIS — M54.50 CHRONIC BILATERAL LOW BACK PAIN WITHOUT SCIATICA: Primary | ICD-10-CM

## 2019-03-18 DIAGNOSIS — G89.29 CHRONIC BILATERAL THORACIC BACK PAIN: ICD-10-CM

## 2019-03-18 PROCEDURE — 97162 PT EVAL MOD COMPLEX 30 MIN: CPT | Performed by: PHYSICAL THERAPIST

## 2019-03-18 PROCEDURE — 97110 THERAPEUTIC EXERCISES: CPT | Performed by: PHYSICAL THERAPIST

## 2019-03-18 NOTE — PROGRESS NOTES
Physical Therapy Initial Evaluation and Plan of Care      Patient: Lucille Linder   : 1975  Diagnosis/ICD-10 Code:  No primary diagnosis found.  Referring practitioner: Paty Reinoso MD    Subjective Evaluation    History of Present Illness  Mechanism of injury: Pt reports back pain T/S since 21 years old.      Recently tension HA and arms go asleep, pain in the low back and the mid back.  Arms are likely to go to sleep at night and some lumbar spine pain noted in the back.     Stiffness in the shoulders.      Children 6 and 8 years old she doesn't carry them much.     Standing still is difficult and sitting still is difficult.     She reports that she exercises frequently but does not perform many trunk stretching exercises.  She has been performing HIIT exercises and that may be part of some of her pain and tightness.        Patient Occupation: Teaches art.   Pain  Current pain ratin  At worst pain ratin  Location: L lumbar spine and T/S midline  Quality: tight (clinching)  Relieving factors: change in position, rest and relaxation (bengin forward and movement)  Aggravating factors: repetitive movement, prolonged positioning and sleeping (upper Extremity numbness and tingling)    Hand dominance: right    Treatments  Previous treatment: physical therapy  Patient Goals  Patient goals for therapy: return to sport/leisure activities, independence with ADLs/IADLs, increased strength and decreased pain             Objective     Special Questions      Additional Special Questions  Pt with elevated scapula and protracted scapula at rest.  The R UT hypertonic and guarded.       Static Posture     Comments  Mild thoracic scoliosis with compensation curve noted in the low thoracic and upper lumbar spine.     Postural Observations  Seated posture: fair  Standing posture: good  Correction of posture: makes symptoms better        Palpation   Left   Hypertonic in the levator scapulae, lumbar  paraspinals and upper trapezius.   Tenderness of the levator scapulae, lumbar paraspinals and upper trapezius.     Right   Hypertonic in the levator scapulae, lumbar paraspinals and upper trapezius. Tenderness of the levator scapulae, lumbar paraspinals and upper trapezius.     Additional Palpation Details  Pec minor tightness and guarding      Hyper sensate in the lumbar spine and thoracic spine.     Tenderness     Lumbar Spine  Tenderness in the spinous process.     Neurological Testing     Sensation     Lumbar   Left   Intact: light touch    Right   Intact: light touch    Active Range of Motion   Cervical/Thoracic Spine     Thoracic   Flexion: WFL  Extension: Active thoracic extension: minimally + restricted in the Thoracic extension. with pain    Lumbar   Flexion: Active lumbar flexion: hypermobility in lumbar flexion.   Extension: Active lumbar extension: hypermobility noted in the Lower lumbar    Left lateral flexion: Active left lumbar lateral flexion: Hypermobility.   Right lateral flexion: Active right lumbar lateral flexion: hypermobility      Passive Range of Motion     Additional Passive Range of Motion Details  Guarded with testing    Guarded in the Lumbar and Thoracic spine limiting testing.     Strength/Myotome Testing     Left Hip   Planes of Motion   Flexion: 5  Extension: 5  Abduction: 5    Right Hip   Planes of Motion   Flexion: 5  Extension: 5  Abduction: 5    Left Knee   Flexion: 5  Extension: 5    Right Knee   Flexion: 5  Extension: 5    Left Ankle/Foot   Dorsiflexion: 5  Plantar flexion: 5    Right Ankle/Foot   Dorsiflexion: 5  Plantar flexion: 5    Muscle Activation   Patient able to activate left transverse abdominals and right transverse abdominals.     Tests     Left Shoulder   Positive Adson maneuver.     Right Shoulder   Positive Adson maneuver.     Lumbar   Positive repeated extension (tightness and improved pain in the Thoracic spine.) and repeated flexion (+ for relief of Lumbar spine  tension and pain).     Left   Negative crossed SLR, femoral stretch and passive SLR.     Right   Negative crossed SLR, femoral stretch and passive SLR.          Assessment & Plan     Assessment  Impairments: abnormal muscle tone, abnormal or restricted ROM, activity intolerance, lacks appropriate home exercise program and pain with function  Assessment details: Patient is a 43 year old female who comes to physical therapy with Lumbar and Thoracic chronic pain.  She seems to have some hypermobility in the Lumbar spine and hypomobility in the thoracic extension.  Posture is a factor as well as incorrect training issue that may be elevating pain.  She seems to have some tightness in the UT and pecs that may be leading to TOCS as well.    The patient currently has pain, decreased ROM, decreased strength, and inability to perform all essential functional activities. Pt will benefit from skilled PT services to address the above issues.     Prognosis: fair  Functional Limitations: carrying objects, lifting, pulling, pushing, uncomfortable because of pain, sitting, standing, stooping and unable to perform repetitive tasks  Goals  Plan Goals: SHORT TERM GOALS:     2 weeks  1. Pt independent with HEP  2. Pt to demonstrate trunk AROM 50-75% of expected norms to allow for improved ability to perform ADL's  3. Pt to demonstrate bilateral hip strength 4/5 in all planes to improved stability of the core/trunk     LONG TERM GOALS:   6 weeks  1. Pt to demonstrate trunk AROM % of expected norms to allow for improved ability to perform functional activities  2. Pt to demonstrate ability to perform full functional squat with good form and without increased pain in the low back   3. Pt to report being able to work full shift or work in the home without increase in pain in the back  4. Pt to report cessation of pain/numbness/tingling into the bilateral leg to show decreased nerve compression      Plan  Therapy options: will be seen  for skilled physical therapy services  Planned modality interventions: cryotherapy, thermotherapy (hydrocollator packs) and electrical stimulation/Russian stimulation  Planned therapy interventions: abdominal trunk stabilization, manual therapy, spinal/joint mobilization, soft tissue mobilization, strengthening, stretching, therapeutic activities, functional ROM exercises, flexibility, body mechanics training, neuromuscular re-education and postural training  Duration in weeks: 6  Treatment plan discussed with: patient  Plan details: Pt will be seen 1-2 x / week.  Assess Pt response to PREP, posture and body mechanics.         Manual Therapy:    5     mins  08646;  Therapeutic Exercise:    16     mins  59280;     Neuromuscular Tae:        mins  21620;    Therapeutic Activity:          mins  61270;     Gait Training:           mins  79683;     Ultrasound:          mins  48222;    Electrical Stimulation:         mins  34836 ( );  Dry Needling          mins self-pay    Timed Treatment:   21   mins   Total Treatment:     56   mins    PT SIGNATURE: Aydin Josue, PT   DATE TREATMENT INITIATED: 3/18/2019    Initial Certification  Certification Period: 6/16/2019  I certify that the therapy services are furnished while this patient is under my care.  The services outlined above are required by this patient, and will be reviewed every 90 days.     PHYSICIAN: Paty Reinoso MD      DATE:     Please sign and return via fax to  .. Thank you, [facility].

## 2019-03-25 ENCOUNTER — TREATMENT (OUTPATIENT)
Dept: PHYSICAL THERAPY | Facility: CLINIC | Age: 44
End: 2019-03-25

## 2019-03-25 DIAGNOSIS — G89.29 CHRONIC BILATERAL LOW BACK PAIN WITHOUT SCIATICA: Primary | ICD-10-CM

## 2019-03-25 DIAGNOSIS — G89.29 CHRONIC BILATERAL THORACIC BACK PAIN: ICD-10-CM

## 2019-03-25 DIAGNOSIS — M54.50 CHRONIC BILATERAL LOW BACK PAIN WITHOUT SCIATICA: Primary | ICD-10-CM

## 2019-03-25 DIAGNOSIS — G54.0 THORACIC OUTLET SYNDROME: ICD-10-CM

## 2019-03-25 DIAGNOSIS — M54.6 CHRONIC BILATERAL THORACIC BACK PAIN: ICD-10-CM

## 2019-03-25 PROCEDURE — 97140 MANUAL THERAPY 1/> REGIONS: CPT | Performed by: PHYSICAL THERAPIST

## 2019-03-25 PROCEDURE — 97530 THERAPEUTIC ACTIVITIES: CPT | Performed by: PHYSICAL THERAPIST

## 2019-03-25 PROCEDURE — 97110 THERAPEUTIC EXERCISES: CPT | Performed by: PHYSICAL THERAPIST

## 2019-03-25 NOTE — PROGRESS NOTES
Physical Therapy Daily Progress Note      Visit # : 2     Lucille Linder reports 2/10 pain today at rest.  Pt reports T/S is more of an issue than any other area.         Objective Pt presents to PT today with minimal guarding noted in the UT B R greater than L.     T/S hypomobility and guarding noted.     Post PT improved T/S mobility and guarded.      See Exercise, Manual, and Modality Logs for complete treatment.     Assessment/Plan  Pt with improved T/S mobility and Less mm guarding post PT.       Progress per Plan of Care  Check response to STM, mobs, and HEP.            Manual Therapy:    11     mins  64777;  Therapeutic Exercise:    31     mins  52428;     Neuromuscular Tae:        mins  31168;    Therapeutic Activity:     12     mins  17978;     Gait Training:        ___  mins  18959;     Ultrasound:          mins  33759;    Electrical Stimulation:         mins  87812 ( );  Dry Needling          mins self-pay    Timed Treatment:   54   mins   Total Treatment:     54   mins    Aydin Josue, PT  Physical Therapist

## 2019-03-27 ENCOUNTER — OFFICE VISIT (OUTPATIENT)
Dept: OBSTETRICS AND GYNECOLOGY | Facility: CLINIC | Age: 44
End: 2019-03-27

## 2019-03-27 VITALS
DIASTOLIC BLOOD PRESSURE: 60 MMHG | SYSTOLIC BLOOD PRESSURE: 120 MMHG | BODY MASS INDEX: 22.82 KG/M2 | WEIGHT: 137 LBS | HEIGHT: 65 IN

## 2019-03-27 DIAGNOSIS — Z87.42 HISTORY OF RECURRENT VAGINAL DISCHARGE: ICD-10-CM

## 2019-03-27 DIAGNOSIS — N76.0 ACUTE VAGINITIS: Primary | ICD-10-CM

## 2019-03-27 PROCEDURE — 99213 OFFICE O/P EST LOW 20 MIN: CPT | Performed by: OBSTETRICS & GYNECOLOGY

## 2019-03-27 NOTE — PROGRESS NOTES
Subjective  Chief Complaint   Patient presents with   • Vaginal Discharge     Patient complains of vaginal discharge with itching and fishy odor x 1 week.      Patient is 43 y.o.  here for evaluation of recurrent vaginal discharge with an acute onset of symptoms.  Patient has a history of having recurrent vaginal discharge with episodes of itching as well as fishy odor.  Patient reports currently having symptoms for the last week.  Patient reports the discharge is clear in nature.  Patient does have irritation as well as itching.  Patient also reports the discharge is a fishy odor.  Patient had previously been seen and had vaginal cultures done in the past.  These returned with with yeast as noted.  Patient did receive treatment.  Reports recurrent symptoms above.  Patient has not used any over-the-counter medications.  Patient denies any pelvic pain or discomfort.  Patient does have a ParaGard IUD.  Patient does report having an increase in discharge and infections since placement of her IUD.  Patient has been on probiotics.    History  Past Medical History:   Diagnosis Date   • Allergic    • B12 deficiency    • Encounter for insertion of ParaGard IUD 2013   • Headache    • Hyperlipidemia    • Hypothyroidism    • Restless leg syndrome    • Snores    • Urinary tract infection      Current Outpatient Medications on File Prior to Visit   Medication Sig Dispense Refill   • levothyroxine (SYNTHROID, LEVOTHROID) 25 MCG tablet Take 1 tablet by mouth Daily. 90 tablet 1     Current Facility-Administered Medications on File Prior to Visit   Medication Dose Route Frequency Provider Last Rate Last Dose   • cyanocobalamin injection 1,000 mcg  1,000 mcg Intramuscular Q28 Days Paty Reinoso MD   1,000 mcg at 17 1117   • cyanocobalamin injection 1,000 mcg  1,000 mcg Intramuscular Q28 Days Paty Reinoso MD   1,000 mcg at 19 1605   • cyanocobalamin injection 1,000 mcg  1,000 mcg Intramuscular Q28  "Days Paty Reinoso MD       • cyanocobalamin injection 1,000 mcg  1,000 mcg Intramuscular Q28 Days Paty Reinoso MD   1,000 mcg at 11/20/18 1607     No Known Allergies  Past Surgical History:   Procedure Laterality Date   • COLONOSCOPY N/A 4/4/2017    Procedure: COLONOSCOPY WITH ANOSCOPY;  Surgeon: Cezar Greenberg MD;  Location: [facility] ENDOSCOPY;  Service:      Family History   Problem Relation Age of Onset   • Lung cancer Father    • Other Other         arteriosclerotic cardiovascular disease, cerebrovascular accident   • Breast cancer Other    • Breast cancer Paternal Grandmother    • Thyroid disease Sister    • Colon cancer Neg Hx      Social History     Socioeconomic History   • Marital status:      Spouse name: Not on file   • Number of children: Not on file   • Years of education: Not on file   • Highest education level: Not on file   Tobacco Use   • Smoking status: Never Smoker   • Smokeless tobacco: Never Used   Substance and Sexual Activity   • Alcohol use: Yes     Alcohol/week: 7.2 oz     Types: 2 Glasses of wine, 7 Cans of beer, 3 Shots of liquor per week     Comment: OCCASSIONALLY-1 DRINK PER DAY.  USUALLY BEER   • Drug use: No   • Sexual activity: Yes     Partners: Male     Birth control/protection: IUD     Review of Systems  All systems were reviewed and negative except for:  Genitourinary: postivie for  vaginal discharge and vaginal itching, vaginal odor.      Objective  Vitals:    03/27/19 1545   BP: 120/60   Weight: 62.1 kg (137 lb)   Height: 165.1 cm (65\")     Physical Exam:  General Appearance: alert, appears stated age and cooperative  Head: normocephalic, without obvious abnormality and atraumatic  Eyes: lids and lashes normal, conjunctivae and sclerae normal, no icterus, no pallor, corneas clear and PERRLA  Ears: ears appear intact with no abnormalities noted  Nose: nares normal, septum midline, mucosa normal and no drainage  Neck: suppple, trachea midline and no thyromegaly  Lungs: " clear to auscultation, respirations regular, respirations even and respirations unlabored  Heart: regular rhythm and normal rate, normal S1, S2, no murmur, gallop, or rubs and no click  Breasts: Not performed.  Abdomen: normal bowel sounds, no masses, no hepatomegaly, no splenomegaly, soft non-tender, no guarding and no rebound tenderness  Pelvic: Clinical staff was present for exam  External genitalia:  normal appearance of the external genitalia including Bartholin's and Hatch's glands.  :  urethral meatus normal;  Vaginal:  normal pink mucosa without prolapse or lesions. discharge present -  mucousy and clear;  Cervix:  normal appearance. IUD string present - 3 cms in length;  Uterus:  normal size, shape and consistency.  Adnexa:  non palpable bilaterally.  Extremities: moves extremities well, no edema, no cyanosis and no redness  Skin: no bleeding, bruising or rash and no lesions noted  Lymph Nodes: no palpable adenopathy  Neuro: CN II-X grossly intact; sensation intact  Psych: normal mood and affect, oriented to person, time and place, thought content organized and appropriate judgment  Lab Review   vaginal cultures +candida albicans 2/13/2019    Imaging   No data reviewed    Assessment/Plan  Problem List Items Addressed This Visit     None      Visit Diagnoses     Acute vaginitis    -  Primary New  Cultures obtained today.  Will hold on treatment pending culture results.    Relevant Orders    NuSwab VG+ - Swab, Vagina    History of recurrent vaginal discharge    Unchanged  Patient with a long history of recurrent vaginal discharge.  Cultures were obtained today as noted.  Plan pending results.  Patient is to continue her probiotics.  She will need further testing if she can infections.  Discussed with patient the possibility of IUD removal if continued infections.  The patient may also need to return for for actinomyces or treatment given her IUD.        Follow up as discussed/scheduled  This note was  electronically signed.  Paty Licea M.D.

## 2019-03-31 LAB
A VAGINAE DNA VAG QL NAA+PROBE: NORMAL SCORE
BVAB2 DNA VAG QL NAA+PROBE: NORMAL SCORE
C ALBICANS DNA VAG QL NAA+PROBE: NEGATIVE
C GLABRATA DNA VAG QL NAA+PROBE: NEGATIVE
C TRACH RRNA SPEC QL NAA+PROBE: NEGATIVE
MEGA1 DNA VAG QL NAA+PROBE: NORMAL SCORE
N GONORRHOEA RRNA SPEC QL NAA+PROBE: NEGATIVE
T VAGINALIS RRNA SPEC QL NAA+PROBE: NEGATIVE

## 2019-04-02 ENCOUNTER — HOSPITAL ENCOUNTER (OUTPATIENT)
Dept: MAMMOGRAPHY | Facility: HOSPITAL | Age: 44
Discharge: HOME | End: 2019-04-02
Admitting: OBSTETRICS & GYNECOLOGY

## 2019-04-02 ENCOUNTER — OFFICE VISIT (OUTPATIENT)
Dept: INTERNAL MEDICINE | Facility: CLINIC | Age: 44
End: 2019-04-02

## 2019-04-02 VITALS
TEMPERATURE: 97.5 F | DIASTOLIC BLOOD PRESSURE: 52 MMHG | HEIGHT: 65 IN | SYSTOLIC BLOOD PRESSURE: 120 MMHG | HEART RATE: 79 BPM | WEIGHT: 135 LBS | BODY MASS INDEX: 22.49 KG/M2 | OXYGEN SATURATION: 100 %

## 2019-04-02 DIAGNOSIS — R59.0 POSTERIOR CERVICAL ADENOPATHY: ICD-10-CM

## 2019-04-02 DIAGNOSIS — H93.13 TINNITUS OF BOTH EARS: Primary | ICD-10-CM

## 2019-04-02 DIAGNOSIS — Z12.31 ENCOUNTER FOR SCREENING MAMMOGRAM FOR BREAST CANCER: ICD-10-CM

## 2019-04-02 PROCEDURE — 77067 SCR MAMMO BI INCL CAD: CPT

## 2019-04-02 PROCEDURE — 99213 OFFICE O/P EST LOW 20 MIN: CPT | Performed by: PHYSICIAN ASSISTANT

## 2019-04-02 PROCEDURE — 77063 BREAST TOMOSYNTHESIS BI: CPT

## 2019-04-02 NOTE — PROGRESS NOTES
Chief Complaint   Patient presents with   • Tinnitus     x 3-4 weeks   • Dizziness     headache with vomiting x 1 day; symptoms have improved       Subjective   Lucille Linder is a 43 y.o. female    History of Present Illness     Patient reports that she had 1 day of headache, vomiting, and vertigo.  These symptoms resolved.  She has continued to have tinnitus for approximately 3-4 weeks.  She does not know if this began prior to her symptoms of vertigo.  She describes it as a high pitched ringing.  Initially she felt that it was a low rushing noise.  She describes the ringing as constant.  Denies any continued headaches, nausea/vomiting, changes in vision, or dizziness.  No focal neurological deficits.  She denies any previous history of tinnitus.     Past Medical History:   Diagnosis Date   • Allergic    • B12 deficiency 2015   • Encounter for insertion of ParaGard IUD 03/2013   • Headache    • Hyperlipidemia    • Hypothyroidism 2015   • Restless leg syndrome 1995   • Snores    • Urinary tract infection      Past Surgical History:   Procedure Laterality Date   • COLONOSCOPY N/A 4/4/2017    Procedure: COLONOSCOPY WITH ANOSCOPY;  Surgeon: Cezar Greenberg MD;  Location: [facility] ENDOSCOPY;  Service:      Family History   Problem Relation Age of Onset   • Lung cancer Father    • Other Other         arteriosclerotic cardiovascular disease, cerebrovascular accident   • Breast cancer Other    • Breast cancer Paternal Grandmother    • Thyroid disease Sister    • Colon cancer Neg Hx      Social History     Socioeconomic History   • Marital status:      Spouse name: Not on file   • Number of children: Not on file   • Years of education: Not on file   • Highest education level: Not on file   Tobacco Use   • Smoking status: Never Smoker   • Smokeless tobacco: Never Used   Substance and Sexual Activity   • Alcohol use: Yes     Alcohol/week: 7.2 oz     Types: 2 Glasses of wine, 7 Cans of beer, 3 Shots of liquor  per week     Comment: OCCASSIONALLY-1 DRINK PER DAY.  USUALLY BEER   • Drug use: No   • Sexual activity: Yes     Partners: Male     Birth control/protection: IUD     No Known Allergies      Review of Systems   Constitutional: Negative for activity change, appetite change, chills, fatigue, fever, unexpected weight gain and unexpected weight loss.   HENT: Negative for congestion, postnasal drip, sinus pressure, sneezing and sore throat.         Tinnitus   Respiratory: Negative for cough and shortness of breath.    Cardiovascular: Negative for chest pain.   Gastrointestinal: Negative for abdominal pain, diarrhea, nausea and vomiting.   Neurological: Negative for dizziness, light-headedness and headache.     Objective     Vitals:    04/02/19 1725   BP: 120/52   Pulse: 79   Temp: 97.5 °F (36.4 °C)   SpO2: 100%       Physical Exam   Constitutional: She is oriented to person, place, and time. She appears well-developed and well-nourished. No distress.   HENT:   Head: Normocephalic and atraumatic.   Right Ear: External ear and ear canal normal. Tympanic membrane is not erythematous, not retracted and not bulging. A middle ear effusion is present.   Left Ear: Tympanic membrane is erythematous. Tympanic membrane is not retracted and not bulging.  No middle ear effusion.   Nose: No rhinorrhea or congestion.   Mouth/Throat: Uvula is midline, oropharynx is clear and moist and mucous membranes are normal.   Mild erythema noted to the left TM no effusion present.  Right TM has clear mid ear effusion no erythema present.   Eyes: Conjunctivae and EOM are normal. Pupils are equal, round, and reactive to light.   Neck: Normal range of motion and full passive range of motion without pain. Neck supple.   Right sided posterior cervical adenopathy.  Soft nontender to touch.   Cardiovascular: Normal rate, regular rhythm and normal heart sounds. Exam reveals no gallop and no friction rub.   No murmur heard.  Pulmonary/Chest: Effort normal  and breath sounds normal. No respiratory distress. She has no wheezes. She has no rales.   Musculoskeletal: Normal range of motion. She exhibits no edema.   Lymphadenopathy:     She has cervical adenopathy.   Neurological: She is alert and oriented to person, place, and time.   Skin: Skin is warm and dry. Capillary refill takes less than 2 seconds. She is not diaphoretic.   Psychiatric: She has a normal mood and affect. Her behavior is normal.   Nursing note and vitals reviewed.    Assessment/Plan     Lucille was seen today for tinnitus and dizziness.    Diagnoses and all orders for this visit:    Tinnitus of both ears  -     Recommend over-the-counter antihistamine such as Zyrtec.  Will place referral for ENT.  Have advised patient that if she develops any additional symptoms to return to clinic sooner.  Otherwise maintain scheduled follow-up with Dr. Reinoso.  -     Ambulatory Referral to ENT (Otolaryngology)    Posterior cervical adenopathy        -     Posterior right cervical adenopathy upon exam today.  This was nontender to touch.  We will continue to monitor.  Have advised patient to monitor at home.  If adenopathy does not resolve or if it enlarges patient will need to return to clinic.      Return if symptoms worsen or fail to improve, for Next scheduled follow up.    Adela Riggs PA-C

## 2019-04-08 ENCOUNTER — TREATMENT (OUTPATIENT)
Dept: PHYSICAL THERAPY | Facility: CLINIC | Age: 44
End: 2019-04-08

## 2019-04-08 DIAGNOSIS — M54.6 CHRONIC BILATERAL THORACIC BACK PAIN: ICD-10-CM

## 2019-04-08 DIAGNOSIS — G89.29 CHRONIC BILATERAL THORACIC BACK PAIN: ICD-10-CM

## 2019-04-08 DIAGNOSIS — M54.50 CHRONIC BILATERAL LOW BACK PAIN WITHOUT SCIATICA: Primary | ICD-10-CM

## 2019-04-08 DIAGNOSIS — G54.0 THORACIC OUTLET SYNDROME: ICD-10-CM

## 2019-04-08 DIAGNOSIS — G89.29 CHRONIC BILATERAL LOW BACK PAIN WITHOUT SCIATICA: Primary | ICD-10-CM

## 2019-04-08 PROCEDURE — 97110 THERAPEUTIC EXERCISES: CPT | Performed by: PHYSICAL THERAPIST

## 2019-04-08 PROCEDURE — 97140 MANUAL THERAPY 1/> REGIONS: CPT | Performed by: PHYSICAL THERAPIST

## 2019-04-08 NOTE — PROGRESS NOTES
Physical Therapy Daily Progress Note      Visit # : 3    Lucille Linder reports 1/10 pain today at rest.  Pt with improved posture and less guarding noted in the upper Trap.  Scapular motion is better.         Objective Pt presents to PT today with improved mechanics of the thoracic spine.     Pt with Upper and mid thoracic spine mobility improved after mobilizations.     Pt needed one VC for posture and body mechanics in resting position.      See Exercise, Manual, and Modality Logs for complete treatment.     Assessment/Plan  Pt with improved T/S mobility and improved pain.  Less tension is noted in the Mid t/s and upper T/s.       Progress strengthening /stabilization /functional activity  Check response to treatment.  Check lumbar spine.            Manual Therapy:    24     mins  03718;  Therapeutic Exercise:    31     mins  47583;     Neuromuscular Tae:        mins  68364;    Therapeutic Activity:          mins  16939;     Gait Training:        ___  mins  13225;     Ultrasound:          mins  97354;    Electrical Stimulation:         mins  86894 ( );  Dry Needling          mins self-pay    Timed Treatment:   55   mins   Total Treatment:     58   mins    Aydin Josue, PT  Physical Therapist

## 2019-04-15 ENCOUNTER — TREATMENT (OUTPATIENT)
Dept: PHYSICAL THERAPY | Facility: CLINIC | Age: 44
End: 2019-04-15

## 2019-04-15 DIAGNOSIS — M54.6 CHRONIC BILATERAL THORACIC BACK PAIN: ICD-10-CM

## 2019-04-15 DIAGNOSIS — G89.29 CHRONIC BILATERAL LOW BACK PAIN WITHOUT SCIATICA: Primary | ICD-10-CM

## 2019-04-15 DIAGNOSIS — G54.0 THORACIC OUTLET SYNDROME: ICD-10-CM

## 2019-04-15 DIAGNOSIS — G89.29 CHRONIC BILATERAL THORACIC BACK PAIN: ICD-10-CM

## 2019-04-15 DIAGNOSIS — M54.50 CHRONIC BILATERAL LOW BACK PAIN WITHOUT SCIATICA: Primary | ICD-10-CM

## 2019-04-15 PROCEDURE — 97140 MANUAL THERAPY 1/> REGIONS: CPT | Performed by: PHYSICAL THERAPIST

## 2019-04-15 PROCEDURE — 97110 THERAPEUTIC EXERCISES: CPT | Performed by: PHYSICAL THERAPIST

## 2019-04-15 NOTE — PROGRESS NOTES
Physical Therapy Daily Progress Note      Visit # : 4    Lucille Linder reports 1/10 pain today at rest.  Pt with UT tightness and pain still present.  Pt reports 75% improvement.         Objective Pt presents to PT today with no distress noted at rest.      R scapular elevation with functional activity.     Pt needed cueing for scapular depression and retraction for UE activity.     Palpation:  R Levator and R Lower trap tender and guarded and hypertrophic.      Trial of DN with no elevated pain.     See Exercise, Manual, and Modality Logs for complete treatment.     Assessment/Plan  Pt with improved T/S mobility.  R UT and Low trap hypertonic and guarded.       Progress per Plan of Care  Check response to DN and mobs.            Manual Therapy:    26     mins  23429;  Therapeutic Exercise:    31     mins  17478;     Neuromuscular Tae:        mins  22591;    Therapeutic Activity:          mins  12770;     Gait Training:        ___  mins  69056;     Ultrasound:          mins  01508;    Electrical Stimulation:         mins  64217 ( );  Dry Needling          mins self-pay    Timed Treatment:   57   mins   Total Treatment:     57   mins    Aydin Josue, PT  Physical Therapist

## 2019-04-22 ENCOUNTER — TREATMENT (OUTPATIENT)
Dept: PHYSICAL THERAPY | Facility: CLINIC | Age: 44
End: 2019-04-22

## 2019-04-22 DIAGNOSIS — G89.29 CHRONIC BILATERAL LOW BACK PAIN WITHOUT SCIATICA: Primary | ICD-10-CM

## 2019-04-22 DIAGNOSIS — G54.0 THORACIC OUTLET SYNDROME: ICD-10-CM

## 2019-04-22 DIAGNOSIS — M54.50 CHRONIC BILATERAL LOW BACK PAIN WITHOUT SCIATICA: Primary | ICD-10-CM

## 2019-04-22 DIAGNOSIS — M54.6 CHRONIC BILATERAL THORACIC BACK PAIN: ICD-10-CM

## 2019-04-22 DIAGNOSIS — G89.29 CHRONIC BILATERAL THORACIC BACK PAIN: ICD-10-CM

## 2019-04-22 PROCEDURE — 97110 THERAPEUTIC EXERCISES: CPT | Performed by: PHYSICAL THERAPIST

## 2019-04-22 PROCEDURE — 97140 MANUAL THERAPY 1/> REGIONS: CPT | Performed by: PHYSICAL THERAPIST

## 2019-04-22 NOTE — PROGRESS NOTES
Physical Therapy Daily Progress Note      Visit # : 5    Lucille Linder reports 0/10 pain today at rest.  Pt reports she felt better after last treatment.  Pt reports she felt better for about 2-3 days with the DN.         Objective Pt presents to PT today with no distress noted at rest.  Pt with good posture at rest.     Scalene and 1st rib on R and L hypomobile and tight.      Mobs and stretching seemed to reduce tightness and discomfort.       See Exercise, Manual, and Modality Logs for complete treatment.     Assessment/Plan  Pt with improvement but she still has some night time numbness.  She has some 1st rib and scalene tightness noted.         Progress per Plan of Care  Check response to scalene and 1st rib mobs.   May try DN again.            Manual Therapy:    26     mins  69831;  Therapeutic Exercise:    28     mins  37946;     Neuromuscular Tae:        mins  46636;    Therapeutic Activity:          mins  86978;     Gait Training:        ___  mins  34910;     Ultrasound:          mins  19674;    Electrical Stimulation:         mins  84568 ( );  Dry Needling          mins self-pay    Timed Treatment:   54   mins   Total Treatment:     54   mins    Aydin Josue, PT  Physical Therapist

## 2019-05-03 ENCOUNTER — TREATMENT (OUTPATIENT)
Dept: PHYSICAL THERAPY | Facility: CLINIC | Age: 44
End: 2019-05-03

## 2019-05-03 DIAGNOSIS — G54.0 THORACIC OUTLET SYNDROME: ICD-10-CM

## 2019-05-03 DIAGNOSIS — M54.50 CHRONIC BILATERAL LOW BACK PAIN WITHOUT SCIATICA: Primary | ICD-10-CM

## 2019-05-03 DIAGNOSIS — G89.29 CHRONIC BILATERAL THORACIC BACK PAIN: ICD-10-CM

## 2019-05-03 DIAGNOSIS — M54.6 CHRONIC BILATERAL THORACIC BACK PAIN: ICD-10-CM

## 2019-05-03 DIAGNOSIS — G89.29 CHRONIC BILATERAL LOW BACK PAIN WITHOUT SCIATICA: Primary | ICD-10-CM

## 2019-05-03 PROCEDURE — 97140 MANUAL THERAPY 1/> REGIONS: CPT | Performed by: PHYSICAL THERAPIST

## 2019-05-03 PROCEDURE — 97530 THERAPEUTIC ACTIVITIES: CPT | Performed by: PHYSICAL THERAPIST

## 2019-05-03 PROCEDURE — 97110 THERAPEUTIC EXERCISES: CPT | Performed by: PHYSICAL THERAPIST

## 2019-05-03 NOTE — PROGRESS NOTES
Physical Therapy Daily Progress Note      Visit # : 6    Lucille Linder reports 5-6/10 pain today at rest.  Pt reports that the first rib and the scalene stretching seem to be helping and she has less numbness noted in the UE's at night when sleeping.     Pt with back pain and soreness from increased activity.     Pt reports she had a tight neck from sleeping.  L neck pain.       Objective Pt presents to PT today with minimal distress noted.     Pt with scalene tightness and 1st rib tightness B UE's.      Lower lumbar spine MM tightness.     Guarding in the MM in the lumbar spine.       See Exercise, Manual, and Modality Logs for complete treatment.     Assessment/Plan  Pt with elevated pain in the L UT and the Levator from sleeping position.  The lumbar spine MM Tightness is also limiting.       Progress per Plan of Care  Check response to DN and mobilizations.            Manual Therapy:    25     mins  19124;  Therapeutic Exercise:    16     mins  57365;     Neuromuscular Tae:        mins  45342;    Therapeutic Activity:     13     mins  34739;     Gait Training:        ___  mins  57776;     Ultrasound:          mins  52473;    Electrical Stimulation:         mins  29330 ( );  Dry Needling          mins self-pay    Timed Treatment:   54   mins   Total Treatment:     72   mins    Aydin Josue, PT  Physical Therapist

## 2019-05-06 ENCOUNTER — TREATMENT (OUTPATIENT)
Dept: PHYSICAL THERAPY | Facility: CLINIC | Age: 44
End: 2019-05-06

## 2019-05-06 DIAGNOSIS — G89.29 CHRONIC BILATERAL THORACIC BACK PAIN: ICD-10-CM

## 2019-05-06 DIAGNOSIS — M54.50 CHRONIC BILATERAL LOW BACK PAIN WITHOUT SCIATICA: Primary | ICD-10-CM

## 2019-05-06 DIAGNOSIS — G89.29 CHRONIC BILATERAL LOW BACK PAIN WITHOUT SCIATICA: Primary | ICD-10-CM

## 2019-05-06 DIAGNOSIS — G54.0 THORACIC OUTLET SYNDROME: ICD-10-CM

## 2019-05-06 DIAGNOSIS — M54.6 CHRONIC BILATERAL THORACIC BACK PAIN: ICD-10-CM

## 2019-05-06 PROCEDURE — 97110 THERAPEUTIC EXERCISES: CPT | Performed by: PHYSICAL THERAPIST

## 2019-05-06 PROCEDURE — 97530 THERAPEUTIC ACTIVITIES: CPT | Performed by: PHYSICAL THERAPIST

## 2019-05-06 PROCEDURE — 97140 MANUAL THERAPY 1/> REGIONS: CPT | Performed by: PHYSICAL THERAPIST

## 2019-05-06 NOTE — PROGRESS NOTES
Physical Therapy Daily Progress Note      Visit # : 7    Lucille Linder reports 2-3/10 pain today at rest.  R UT and levator spasm more today.  Pt with numbness still there a little bit.   No tension HA's since the start of PT.          Objective Pt presents to PT today with posture improved.      Pt with the R shoulder elevation moderate.  R UT and levator scapular .      Pt with TOCS sxs noted with Pec minor and scalene stretching.       See Exercise, Manual, and Modality Logs for complete treatment.     Assessment/Plan  Pt with improved overall status.  She has MM guarding that continues to limit her recovery.  She is responding to mobs, stretching and DN treatment.        Progress per Plan of Care check response to DN again.            Manual Therapy:    26     mins  24715;  Therapeutic Exercise:    20     mins  57027;     Neuromuscular Tae:        mins  70656;    Therapeutic Activity:     9     mins  61694;     Gait Training:        ___  mins  77481;     Ultrasound:          mins  99576;    Electrical Stimulation:         mins  63399 ( );  Dry Needling          mins self-pay    Timed Treatment:   55   mins   Total Treatment:     65   mins    Aydin Jouse, PT  Physical Therapist

## 2019-05-10 ENCOUNTER — OFFICE VISIT (OUTPATIENT)
Dept: OBSTETRICS AND GYNECOLOGY | Facility: CLINIC | Age: 44
End: 2019-05-10

## 2019-05-10 VITALS
HEIGHT: 65 IN | WEIGHT: 136 LBS | SYSTOLIC BLOOD PRESSURE: 119 MMHG | DIASTOLIC BLOOD PRESSURE: 62 MMHG | BODY MASS INDEX: 22.66 KG/M2

## 2019-05-10 DIAGNOSIS — E03.9 HYPOTHYROIDISM, UNSPECIFIED TYPE: ICD-10-CM

## 2019-05-10 DIAGNOSIS — N64.4 BILATERAL MASTODYNIA: Primary | ICD-10-CM

## 2019-05-10 PROCEDURE — 99214 OFFICE O/P EST MOD 30 MIN: CPT | Performed by: OBSTETRICS & GYNECOLOGY

## 2019-05-10 RX ORDER — FLUTICASONE PROPIONATE 50 MCG
SPRAY, SUSPENSION (ML) NASAL
Refills: 11 | COMMUNITY
Start: 2019-04-18 | End: 2020-03-10

## 2019-05-10 NOTE — PROGRESS NOTES
Subjective  Chief Complaint   Patient presents with   • Breast Pain     Patient complains of bilateral breast pain x 2-3 weeks.      Patient is 43 y.o.  here for bilateral breast pain.  Patient reports an onset of symptoms 2 to 3 weeks ago.  Patient reports having a fullness in her right breast.  Patient has not palpated a mass however.  Patient denies any discharge from her nipples.  Patient denies any fever or chills.  Patient denies any changes in her medications.  Patient does have a history of hypothyroidism.  Patient has not had a recent check for her thyroid.  Patient reports her medication was changed to levothyroxine.  Patient denies any headaches.  Patient denies any visual changes.  Patient does have a family history of breast cancer.  Patient had her mammogram in April of this year which was normal.    History  Past Medical History:   Diagnosis Date   • Allergic    • B12 deficiency    • Encounter for insertion of ParaGard IUD 2013   • Headache    • Hyperlipidemia    • Hypothyroidism    • Restless leg syndrome    • Snores    • Urinary tract infection      Current Outpatient Medications on File Prior to Visit   Medication Sig Dispense Refill   • fluticasone (FLONASE) 50 MCG/ACT nasal spray Instill 1 spray into each nostril twice daily angling the tip of the applicator towards the top of the ear on the same side  11   • levothyroxine (SYNTHROID, LEVOTHROID) 25 MCG tablet Take 1 tablet by mouth Daily. 90 tablet 1     Current Facility-Administered Medications on File Prior to Visit   Medication Dose Route Frequency Provider Last Rate Last Dose   • cyanocobalamin injection 1,000 mcg  1,000 mcg Intramuscular Q28 Days Paty Reinoso MD   1,000 mcg at 17 1117   • cyanocobalamin injection 1,000 mcg  1,000 mcg Intramuscular Q28 Days Paty Reinoso MD   1,000 mcg at 19 1605   • cyanocobalamin injection 1,000 mcg  1,000 mcg Intramuscular Q28 Days Paty Reinoso MD       •  "cyanocobalamin injection 1,000 mcg  1,000 mcg Intramuscular Q28 Days Paty Reinoso MD   1,000 mcg at 11/20/18 1607     No Known Allergies  Past Surgical History:   Procedure Laterality Date   • COLONOSCOPY N/A 4/4/2017    Procedure: COLONOSCOPY WITH ANOSCOPY;  Surgeon: Cezar Greenberg MD;  Location: [facility] ENDOSCOPY;  Service:      Family History   Problem Relation Age of Onset   • Lung cancer Father    • Other Other         arteriosclerotic cardiovascular disease, cerebrovascular accident   • Breast cancer Other    • Breast cancer Paternal Grandmother    • Thyroid disease Sister    • Colon cancer Neg Hx      Social History     Socioeconomic History   • Marital status:      Spouse name: Not on file   • Number of children: Not on file   • Years of education: Not on file   • Highest education level: Not on file   Tobacco Use   • Smoking status: Never Smoker   • Smokeless tobacco: Never Used   Substance and Sexual Activity   • Alcohol use: Yes     Alcohol/week: 7.2 oz     Types: 2 Glasses of wine, 7 Cans of beer, 3 Shots of liquor per week     Comment: OCCASSIONALLY-1 DRINK PER DAY.  USUALLY BEER   • Drug use: No   • Sexual activity: Yes     Partners: Male     Birth control/protection: IUD     Review of Systems  All systems were reviewed and negative except for:  Breast:  positive for pain     Objective  Vitals:    05/10/19 1446   BP: 119/62   Weight: 61.7 kg (136 lb)   Height: 165.1 cm (65\")     Physical Exam:  General Appearance: alert, appears stated age and cooperative  Head: normocephalic, without obvious abnormality and atraumatic  Eyes: lids and lashes normal, conjunctivae and sclerae normal, no icterus, no pallor, corneas clear and PERRLA  Ears: ears appear intact with no abnormalities noted  Nose: nares normal, septum midline, mucosa normal and no drainage  Neck: suppple, trachea midline and no thyromegaly  Lungs: clear to auscultation, respirations regular, respirations even and respirations " unlabored  Heart: regular rhythm and normal rate, normal S1, S2, no murmur, gallop, or rubs and no click  Breasts: Examined in supine position  Symmetric without masses or skin dimpling  Nipples normal without inversion, lesions or discharge  There are no palpable axillary nodes  Fibrocystic changes are present both breasts without a discrete mass  Abdomen: normal bowel sounds, no masses, no hepatomegaly, no splenomegaly, soft non-tender, no guarding and no rebound tenderness  Pelvic: Not performed.  Extremities: moves extremities well, no edema, no cyanosis and no redness  Skin: no bleeding, bruising or rash and no lesions noted  Lymph Nodes: no palpable adenopathy  Neuro: CN II-X grossly intact; sensation intact  Psych: normal mood and affect, oriented to person, time and place, thought content organized and appropriate judgment  Lab Review   No data reviewed    Imaging   Mammogram report    Assessment/Plan  Problem List Items Addressed This Visit        Endocrine    Hypothyroidism  We will obtain the following labs as noted today.  Instructed to call for results.    Relevant Orders    Thyroid Panel With TSH      Other Visit Diagnoses     Bilateral mastodynia    -  Primary New  We will obtain the following labs as noted.  Patient has had a recent mammogram.  Will obtain bilateral ultrasound as noted.  I reviewed with Lucille that caffeine reduction may help reduce breast pains associated with fibrocystic change.  Furthermore I explained that vitamin E  400 units twice daily may help to lessen the associated breast pains.  If she notices a discretely palpable lump or bloody nipple discharge associated with her breast pain, we need to be notified so that further testing can be scheduled.    Relevant Orders    Prolactin    US Breast Bilateral Complete        Follow up as discussed/scheduled  This note was electronically signed.  Paty Licea M.D.

## 2019-05-11 LAB
FT4I SERPL CALC-MCNC: 1.8 (ref 1.2–4.9)
PROLACTIN SERPL-MCNC: 13.2 NG/ML (ref 4.8–23.3)
T3RU NFR SERPL: 27 % (ref 24–39)
T4 SERPL-MCNC: 6.6 UG/DL (ref 4.5–12)
TSH SERPL DL<=0.005 MIU/L-ACNC: 3.19 UIU/ML (ref 0.45–4.5)

## 2019-05-13 ENCOUNTER — TREATMENT (OUTPATIENT)
Dept: PHYSICAL THERAPY | Facility: CLINIC | Age: 44
End: 2019-05-13

## 2019-05-13 DIAGNOSIS — G54.0 THORACIC OUTLET SYNDROME: ICD-10-CM

## 2019-05-13 DIAGNOSIS — G89.29 CHRONIC BILATERAL LOW BACK PAIN WITHOUT SCIATICA: Primary | ICD-10-CM

## 2019-05-13 DIAGNOSIS — G89.29 CHRONIC BILATERAL THORACIC BACK PAIN: ICD-10-CM

## 2019-05-13 DIAGNOSIS — M54.50 CHRONIC BILATERAL LOW BACK PAIN WITHOUT SCIATICA: Primary | ICD-10-CM

## 2019-05-13 DIAGNOSIS — M54.6 CHRONIC BILATERAL THORACIC BACK PAIN: ICD-10-CM

## 2019-05-13 PROCEDURE — 97110 THERAPEUTIC EXERCISES: CPT | Performed by: PHYSICAL THERAPIST

## 2019-05-13 PROCEDURE — 97140 MANUAL THERAPY 1/> REGIONS: CPT | Performed by: PHYSICAL THERAPIST

## 2019-05-14 NOTE — PROGRESS NOTES
Physical Therapy Daily Progress Note      Visit # : 8    Lucille Linder reports 1-2/10 pain today at rest.  Pt reports that she is feeling much better overall.  The stiff neck pain has been the thing that has been lingering.  She reports that the numbness is less in the UE's and she has not awakened with as much numbness.         Objective Pt presents to PT today with no distress noted.      Palpation:  B Levator and Scalene tightness and joint hypomobility noted at the T1-T3 segments.     C/S end range mobility is still painful due to hypomobility at the T1-T 3 segments.        See Exercise, Manual, and Modality Logs for complete treatment.     Assessment/Plan  Pt with good progress and much less pain overall.  She is improving.  She still has T1-T3 hypomobility.        Progress per Plan of Care  Re-assess next visit for potential D/C.            Manual Therapy:    28     mins  71624;  Therapeutic Exercise:    25     mins  36813;     Neuromuscular Tae:        mins  93412;    Therapeutic Activity:          mins  78214;     Gait Training:        ___  mins  91393;     Ultrasound:          mins  77544;    Electrical Stimulation:         mins  19451 ( );  Dry Needling          mins self-pay    Timed Treatment:   53   mins   Total Treatment:     53   mins    Aydin Josue, PT  Physical Therapist

## 2019-05-20 ENCOUNTER — TREATMENT (OUTPATIENT)
Dept: PHYSICAL THERAPY | Facility: CLINIC | Age: 44
End: 2019-05-20

## 2019-05-20 DIAGNOSIS — M54.6 CHRONIC BILATERAL THORACIC BACK PAIN: ICD-10-CM

## 2019-05-20 DIAGNOSIS — G89.29 CHRONIC BILATERAL LOW BACK PAIN WITHOUT SCIATICA: Primary | ICD-10-CM

## 2019-05-20 DIAGNOSIS — M54.50 CHRONIC BILATERAL LOW BACK PAIN WITHOUT SCIATICA: Primary | ICD-10-CM

## 2019-05-20 DIAGNOSIS — G54.0 THORACIC OUTLET SYNDROME: ICD-10-CM

## 2019-05-20 DIAGNOSIS — G89.29 CHRONIC BILATERAL THORACIC BACK PAIN: ICD-10-CM

## 2019-05-20 PROCEDURE — 97110 THERAPEUTIC EXERCISES: CPT | Performed by: PHYSICAL THERAPIST

## 2019-05-20 PROCEDURE — 97530 THERAPEUTIC ACTIVITIES: CPT | Performed by: PHYSICAL THERAPIST

## 2019-05-20 PROCEDURE — 97140 MANUAL THERAPY 1/> REGIONS: CPT | Performed by: PHYSICAL THERAPIST

## 2019-05-20 PROCEDURE — A9999 DME SUPPLY OR ACCESSORY, NOS: HCPCS | Performed by: PHYSICAL THERAPIST

## 2019-05-20 NOTE — PROGRESS NOTES
Physical Therapy Daily Progress Note      Visit # : 9    Lucille Linder reports 1-2/10 pain today at rest.  Pt reports some ridual stiffness in the neck. Pt reports she does feel better in the hands at night.         Objective Pt presents to PT today with no distress.  Pt with good posture.    Educated on sleeping position.  Pt with no neck pain with use of cervical roll.     Pt is independent with HEP.    Pt still has some hypertonicity noted in the levator scapula MM and the scalenes.       See Exercise, Manual, and Modality Logs for complete treatment.     Assessment/Plan  Pt with all goals met.  Pt has good response to PT.       D/C to HEP at this time.             Manual Therapy:    29     mins  89480;  Therapeutic Exercise:     11    mins  28120;     Neuromuscular Tae:        mins  26324;    Therapeutic Activity:     13     mins  72131;     Gait Training:        ___  mins  60316;     Ultrasound:          mins  96242;    Electrical Stimulation:         mins  15909 ( );  Dry Needling          mins self-pay    Timed Treatment:   53   mins   Total Treatment:     53   mins    Aydin Josue, PT  Physical Therapist

## 2019-05-23 ENCOUNTER — HOSPITAL ENCOUNTER (OUTPATIENT)
Dept: ULTRASOUND IMAGING | Facility: HOSPITAL | Age: 44
Discharge: HOME | End: 2019-05-23
Admitting: OBSTETRICS & GYNECOLOGY

## 2019-05-23 DIAGNOSIS — N64.4 BILATERAL MASTODYNIA: ICD-10-CM

## 2019-05-23 PROCEDURE — 76641 ULTRASOUND BREAST COMPLETE: CPT

## 2019-07-12 ENCOUNTER — TELEPHONE (OUTPATIENT)
Dept: INTERNAL MEDICINE | Facility: CLINIC | Age: 44
End: 2019-07-12

## 2019-07-12 NOTE — TELEPHONE ENCOUNTER
Patient called believing she may have had food poisoning on 07/11/19. Patient wanted to talk to a clinical staff person to see if she needs to come in today or if she needs to wait for a bit to see if it passes on its own.    Patient states she should be available all day to take a call.    Please call and advise.

## 2019-08-06 ENCOUNTER — OFFICE VISIT (OUTPATIENT)
Dept: OBSTETRICS AND GYNECOLOGY | Facility: CLINIC | Age: 44
End: 2019-08-06

## 2019-08-06 VITALS
WEIGHT: 142.2 LBS | HEIGHT: 65 IN | SYSTOLIC BLOOD PRESSURE: 120 MMHG | DIASTOLIC BLOOD PRESSURE: 70 MMHG | BODY MASS INDEX: 23.69 KG/M2

## 2019-08-06 DIAGNOSIS — N76.1 SUBACUTE VAGINITIS: Primary | ICD-10-CM

## 2019-08-06 PROCEDURE — 99213 OFFICE O/P EST LOW 20 MIN: CPT | Performed by: PHYSICIAN ASSISTANT

## 2019-08-06 RX ORDER — FLUCONAZOLE 200 MG/1
TABLET ORAL
COMMUNITY
Start: 2019-06-06 | End: 2019-10-14

## 2019-08-06 RX ORDER — FLUCONAZOLE 150 MG/1
TABLET ORAL
Qty: 2 TABLET | Refills: 0 | Status: SHIPPED | OUTPATIENT
Start: 2019-08-06 | End: 2019-10-14

## 2019-08-06 RX ORDER — METRONIDAZOLE 500 MG/1
TABLET ORAL
COMMUNITY
Start: 2019-06-06 | End: 2019-10-14

## 2019-08-06 NOTE — PROGRESS NOTES
Subjective   Chief Complaint   Patient presents with   • Vaginal Discharge     C/O vaginal discharge and itching and vaginal discomfort       Lucille Linder is a 43 y.o. year old  presenting to be seen for complaint of vaginal itching and discomfort that started in early .  She was away on vacation and was seen at an urgent care. An exam was not done but patient reports she was given a swab to perform on herself. She was then given Flagyl pills and also Flagyl given to her .  She has continued to have white vaginal discharge and itching. She is  and monogamous. Has Paragard IUD           Past Medical History:   Diagnosis Date   • Allergic    • B12 deficiency    • Encounter for insertion of ParaGard IUD 2013   • Headache    • Hyperlipidemia    • Hypothyroidism    • Restless leg syndrome    • Snores    • Urinary tract infection         Current Outpatient Medications:   •  levothyroxine (SYNTHROID, LEVOTHROID) 25 MCG tablet, Take 1 tablet by mouth Daily., Disp: 90 tablet, Rfl: 1  •  Probiotic Product (PROBIOTIC DAILY PO), Take  by mouth., Disp: , Rfl:   •  fluconazole (DIFLUCAN) 150 MG tablet, One po now and repeat in 3 days, Disp: 2 tablet, Rfl: 0  •  fluconazole (DIFLUCAN) 200 MG tablet, , Disp: , Rfl:   •  fluticasone (FLONASE) 50 MCG/ACT nasal spray, Instill 1 spray into each nostril twice daily angling the tip of the applicator towards the top of the ear on the same side, Disp: , Rfl: 11  •  metroNIDAZOLE (FLAGYL) 500 MG tablet, , Disp: , Rfl:     Current Facility-Administered Medications:   •  cyanocobalamin injection 1,000 mcg, 1,000 mcg, Intramuscular, Q28 Days, Paty Reinoso MD, 1,000 mcg at 17 1117  •  cyanocobalamin injection 1,000 mcg, 1,000 mcg, Intramuscular, Q28 Days, Paty Reinoso MD, 1,000 mcg at 19 1605  •  cyanocobalamin injection 1,000 mcg, 1,000 mcg, Intramuscular, Q28 Days, Paty Reinoso MD  •  cyanocobalamin injection 1,000 mcg,  "1,000 mcg, Intramuscular, Q28 Days, Paty Reinoso MD, 1,000 mcg at 11/20/18 1607   No Known Allergies   Past Surgical History:   Procedure Laterality Date   • COLONOSCOPY N/A 4/4/2017    Procedure: COLONOSCOPY WITH ANOSCOPY;  Surgeon: Cezar Greenberg MD;  Location: [facility] END[facility]COPY;  Service:       Social History     Socioeconomic History   • Marital status:      Spouse name: Not on file   • Number of children: Not on file   • Years of education: Not on file   • Highest education level: Not on file   Tobacco Use   • Smoking status: Never Smoker   • Smokeless tobacco: Never Used   Substance and Sexual Activity   • Alcohol use: Yes     Alcohol/week: 7.2 oz     Types: 2 Glasses of wine, 7 Cans of beer, 3 Shots of liquor per week     Comment: OCCASSIONALLY-1 DRINK PER DAY.  USUALLY BEER   • Drug use: No   • Sexual activity: Yes     Partners: Male     Birth control/protection: IUD      Family History   Problem Relation Age of Onset   • Lung cancer Father    • Other Other         arteriosclerotic cardiovascular disease, cerebrovascular accident   • Breast cancer Other    • Breast cancer Paternal Grandmother    • Thyroid disease Sister    • Colon cancer Neg Hx        Review of Systems   Constitutional: Negative.    Gastrointestinal: Negative.    Genitourinary: Positive for vaginal discharge. Negative for difficulty urinating, dysuria, menstrual problem and pelvic pain.           Objective   /70   Ht 165.1 cm (65\")   Wt 64.5 kg (142 lb 3.2 oz)   LMP 07/16/2019 (Approximate)   Breastfeeding? No   BMI 23.66 kg/m²     Physical Exam   Constitutional: She appears well-developed and well-nourished. She is cooperative. No distress.   Abdominal: Soft. Normal appearance. There is no tenderness.   Genitourinary: Uterus normal. There is no tenderness or lesion on the right labia. There is no tenderness or lesion on the left labia. Cervix exhibits no motion tenderness and no friability. Right adnexum displays no mass " and no tenderness. Left adnexum displays no mass and no tenderness. There is erythema in the vagina. No tenderness in the vagina. Vaginal discharge found.   Genitourinary Comments: Moderate thick creamy tan discharge   Neurological: She is alert.   Skin: Skin is warm and dry.   Psychiatric: She has a normal mood and affect. Her behavior is normal.            Assessment and Plan  Lucille was seen today for vaginal discharge.    Diagnoses and all orders for this visit:    Subacute vaginitis  -     NuSwab VG+ - Swab, Vagina    Other orders  -     fluconazole (DIFLUCAN) 150 MG tablet; One po now and repeat in 3 days      Patient Instructions   Symptoms most consistent with yeast so will treat with Diflucan today  VG plus swab done and will contact patient with results when available             This note was electronically signed.    Radha Pradhan PA-C   August 7, 2019

## 2019-08-07 NOTE — PATIENT INSTRUCTIONS
Symptoms most consistent with yeast so will treat with Diflucan today  VG plus swab done and will contact patient with results when available

## 2019-08-20 RX ORDER — LEVOTHYROXINE SODIUM 0.03 MG/1
TABLET ORAL
Qty: 90 TABLET | Refills: 0 | Status: SHIPPED | OUTPATIENT
Start: 2019-08-20 | End: 2019-08-20 | Stop reason: SDUPTHER

## 2019-08-22 RX ORDER — LEVOTHYROXINE SODIUM 0.03 MG/1
25 TABLET ORAL DAILY
Qty: 90 TABLET | Refills: 2 | Status: SHIPPED | OUTPATIENT
Start: 2019-08-22 | End: 2020-08-17

## 2019-10-14 ENCOUNTER — HOSPITAL ENCOUNTER (OUTPATIENT)
Dept: GENERAL RADIOLOGY | Facility: HOSPITAL | Age: 44
Discharge: HOME | End: 2019-10-14
Admitting: NURSE PRACTITIONER

## 2019-10-14 ENCOUNTER — OFFICE VISIT (OUTPATIENT)
Dept: INTERNAL MEDICINE | Facility: CLINIC | Age: 44
End: 2019-10-14

## 2019-10-14 VITALS
BODY MASS INDEX: 22.99 KG/M2 | HEART RATE: 73 BPM | OXYGEN SATURATION: 98 % | WEIGHT: 138 LBS | HEIGHT: 65 IN | DIASTOLIC BLOOD PRESSURE: 66 MMHG | TEMPERATURE: 97.7 F | SYSTOLIC BLOOD PRESSURE: 105 MMHG

## 2019-10-14 DIAGNOSIS — M79.671 PAIN OF RIGHT MIDFOOT: Primary | ICD-10-CM

## 2019-10-14 DIAGNOSIS — J40 BRONCHITIS: ICD-10-CM

## 2019-10-14 DIAGNOSIS — H69.83 DYSFUNCTION OF EUSTACHIAN TUBE, BILATERAL: ICD-10-CM

## 2019-10-14 PROCEDURE — 73630 X-RAY EXAM OF FOOT: CPT

## 2019-10-14 PROCEDURE — 99214 OFFICE O/P EST MOD 30 MIN: CPT | Performed by: NURSE PRACTITIONER

## 2019-10-14 RX ORDER — BENZONATATE 100 MG/1
100 CAPSULE ORAL 3 TIMES DAILY PRN
Qty: 9 CAPSULE | Refills: 0 | Status: SHIPPED | OUTPATIENT
Start: 2019-10-14 | End: 2019-10-17

## 2019-10-14 RX ORDER — DEXTROMETHORPHAN HYDROBROMIDE AND PROMETHAZINE HYDROCHLORIDE 15; 6.25 MG/5ML; MG/5ML
2.5 SYRUP ORAL 4 TIMES DAILY PRN
Qty: 118 ML | Refills: 0 | Status: SHIPPED | OUTPATIENT
Start: 2019-10-14 | End: 2019-10-24

## 2019-10-14 NOTE — PROGRESS NOTES
Date: 10/14/2019    Name: Lucille Linder  : 1975    Chief Complaint:   Chief Complaint   Patient presents with   • Cough     rt ear pain, rt foot pain       HPI: Patient presents today with a 2-week history of productive cough.  States she initially had upper respiratory symptoms including sore throat, nasal congestion, sneezing.  URI symptoms have resolved in the last week.  Cough is persistent, and occasionally painful due to force of cough.  Denies fever, chills, shortness of breath, wheezes, appetite change, fatigue.  Cough occasionally keeps her up at night.  She has a history of ear problems.  Has noticed tinnitus has increased in the past few days.  Ears feel full.  She was advised by ENT to take Flonase for symptoms.  She has not started to take that as she is concerned it may worsen cough.  Lucille also reports a 6-week history of right midfoot pain.  It is worse when she first stands up, and she feels like it works itself out as she moves around.  Pain is sharp, stabbing.  Rates pain as 1 out of 10 at this time.  No new footwear, no injury recalled, no increase in physical activity.  She does have a history of plantar fasciitis.      History:  The following portions of the patient's history were reviewed and updated as appropriate: allergies, current medications, past medical history, family history, surgical history, social history and problem list.     ROS:  Review of Systems   Constitutional: Negative for appetite change.   HENT: Negative for congestion, ear discharge, ear pain, hearing loss, trouble swallowing and voice change.    Respiratory: Negative for chest tightness.    Cardiovascular: Negative for palpitations and leg swelling.   Musculoskeletal: Positive for gait problem (when first standing up). Negative for myalgias.   Neurological: Negative for dizziness and headache.       VS:  Vitals:    10/14/19 1056   BP: 105/66   Pulse: 73   Temp: 97.7 °F (36.5 °C)   TempSrc: Temporal  "  SpO2: 98%   Weight: 62.6 kg (138 lb)   Height: 165.1 cm (65\")     Body mass index is 22.96 kg/m².    PE:  Physical Exam   Constitutional: She is oriented to person, place, and time. She appears well-developed and well-nourished. No distress.   HENT:   Head: Normocephalic.   Right Ear: Tympanic membrane, external ear and ear canal normal.   Left Ear: Tympanic membrane, external ear and ear canal normal.   Nose: Mucosal edema present. No sinus tenderness.   Mouth/Throat: Uvula is midline, oropharynx is clear and moist and mucous membranes are normal.   Eyes: Conjunctivae are normal. Pupils are equal, round, and reactive to light.   Neck: Normal range of motion.   Cardiovascular: Normal rate, regular rhythm, normal heart sounds and intact distal pulses.   Pulmonary/Chest: Effort normal. She has wheezes (expiratory). She has no rales.   Musculoskeletal: Normal range of motion.   Right foot not tender to palpation, no decrease in range of motion, no effusion, deformity or spasm.  Bilateral PT and DP pulses equal, normal   Lymphadenopathy:     She has cervical adenopathy.   Neurological: She is alert and oriented to person, place, and time.   Skin: Skin is warm. Capillary refill takes less than 2 seconds.         Assessment/Plan:  Lucille was seen today for cough.    Diagnoses and all orders for this visit:    Pain of right midfoot  -     XR Foot 3+ View Right  - Advised to rest foot, wear supportive footwear  Bronchitis  -     promethazine-dextromethorphan (PROMETHAZINE-DM) 6.25-15 MG/5ML syrup; Take 2.5 mL by mouth 4 (Four) Times a Day As Needed for Cough for up to 10 days. May increase to 5 mL if needed.  Advised may cause drowsiness; not to be taken with OTC cold/cough/sinus remedies, alcohol or sedatives.  -     benzonatate (TESSALON) 100 MG capsule; Take 1 capsule by mouth 3 (Three) Times a Day As Needed for Cough for up to 3 days.  - Monitor for worsening cough, fever, chills, fatigue, malaise  Dysfunction of " Eustachian tube, bilateral        - Advised may use Flonase as directed by ENT  Lucille stated she will get her flu vaccine after her cough is resolved for about a week.  Return if symptoms worsen or fail to improve.

## 2019-10-26 ENCOUNTER — OFFICE VISIT (OUTPATIENT)
Dept: INTERNAL MEDICINE | Facility: CLINIC | Age: 44
End: 2019-10-26

## 2019-10-26 VITALS
WEIGHT: 144 LBS | RESPIRATION RATE: 16 BRPM | DIASTOLIC BLOOD PRESSURE: 64 MMHG | HEIGHT: 65 IN | BODY MASS INDEX: 23.99 KG/M2 | TEMPERATURE: 98.1 F | HEART RATE: 84 BPM | SYSTOLIC BLOOD PRESSURE: 102 MMHG | OXYGEN SATURATION: 98 %

## 2019-10-26 DIAGNOSIS — M94.0 COSTOCHONDRITIS, ACUTE: Primary | ICD-10-CM

## 2019-10-26 DIAGNOSIS — R05.9 COUGH: ICD-10-CM

## 2019-10-26 PROCEDURE — 99213 OFFICE O/P EST LOW 20 MIN: CPT | Performed by: FAMILY MEDICINE

## 2019-10-26 RX ORDER — VITAMIN E 268 MG
400 CAPSULE ORAL DAILY
COMMUNITY

## 2019-10-26 NOTE — ASSESSMENT & PLAN NOTE
Likely secondary to cough.  She reports cough is improving.  She has been advised to take ibuprofen OTC for pain and inflammation.  Also discussed use of topical creams or natural supplements for inflammation.  Discussed if she does not notice an improvement over the next few day to call.  She may need a rib x-ray.

## 2019-10-26 NOTE — ASSESSMENT & PLAN NOTE
Gradually improving.  Discussed tessalon perles work differently than the cough syrup and advised to take prn.

## 2019-10-26 NOTE — PROGRESS NOTES
Lucille Linder is a 43 y.o. female.    Chief Complaint   Patient presents with   • Cough     cough ongoing for 4 weeks, but is a little better   • Chest Pain     Rib pain on the right lower side of ribs, dull pain all time, but becomes sharp with cough.  Started out just feeeling sore, but last couple days has gotten much worse.       HPI   Patient reports sharp pain to right lower ribs.  She reports soreness for about 1 week.   She reports yesterday it became sharp and intesnse when she coughs.  She reports it is constantly dull.   It does not radiate anywhere.  She has not tried anything for the pain.  She does report she has had a cough for several weeks that is gradually improving.  She was prescribed promethazine-dm and felt as if she was having a panic attack.  She was also given tessalon perles and was afraid to try them.      The following portions of the patient's history were reviewed and updated as appropriate: allergies, current medications, past family history, past medical history, past social history, past surgical history and problem list.     No Known Allergies      Current Outpatient Medications:   •  CRANBERRY JUICE EXTRACT PO, Take  by mouth Daily., Disp: , Rfl:   •  fluticasone (FLONASE) 50 MCG/ACT nasal spray, Instill 1 spray into each nostril twice daily angling the tip of the applicator towards the top of the ear on the same side, Disp: , Rfl: 11  •  levothyroxine (SYNTHROID, LEVOTHROID) 25 MCG tablet, Take 1 tablet by mouth Daily., Disp: 90 tablet, Rfl: 2  •  Probiotic Product (PROBIOTIC DAILY PO), Take  by mouth., Disp: , Rfl:   •  vitamin E 400 UNIT capsule, Take 400 Units by mouth Daily., Disp: , Rfl:     Current Facility-Administered Medications:   •  cyanocobalamin injection 1,000 mcg, 1,000 mcg, Intramuscular, Q28 Days, Paty Reinoso MD, 1,000 mcg at 07/07/17 1117  •  cyanocobalamin injection 1,000 mcg, 1,000 mcg, Intramuscular, Q28 Days, Paty Reinoso MD, 1,000 mcg at  "01/11/19 1605  •  cyanocobalamin injection 1,000 mcg, 1,000 mcg, Intramuscular, Q28 Days, Paty Reinoso MD  •  cyanocobalamin injection 1,000 mcg, 1,000 mcg, Intramuscular, Q28 Days, Paty Reinoso MD, 1,000 mcg at 11/20/18 1607    ROS    Review of Systems   Constitutional: Negative for chills and fever.   HENT: Negative for congestion.    Respiratory: Positive for cough. Negative for shortness of breath and wheezing.    Cardiovascular: Positive for chest pain (right lower rib).   Gastrointestinal: Negative for abdominal pain, constipation, diarrhea, nausea and vomiting.   Musculoskeletal: Positive for arthralgias.       Vitals:    10/26/19 1117   BP: 102/64   BP Location: Right arm   Pulse: 84   Resp: 16   Temp: 98.1 °F (36.7 °C)   TempSrc: Temporal   SpO2: 98%   Weight: 65.3 kg (144 lb)   Height: 165.1 cm (65\")     Body mass index is 23.96 kg/m².    Physical Exam     Physical Exam   Constitutional: She is oriented to person, place, and time. She appears well-developed and well-nourished. No distress.   HENT:   Head: Normocephalic and atraumatic.   Right Ear: External ear normal.   Left Ear: External ear normal.   Eyes: Conjunctivae and EOM are normal.   Cardiovascular: Normal rate and regular rhythm.   No murmur heard.  Pulmonary/Chest: Effort normal and breath sounds normal. No respiratory distress. She has no wheezes. She exhibits tenderness (right lower ribs).       Abdominal: Soft. Bowel sounds are normal. She exhibits no distension. There is no tenderness.   Neurological: She is alert and oriented to person, place, and time. No cranial nerve deficit.   Skin: Skin is warm and dry.   Psychiatric: Her speech is normal. Her mood appears anxious.       Assessment/Plan    Problem List Items Addressed This Visit        Respiratory    Cough     Gradually improving.  Discussed tessalon perles work differently than the cough syrup and advised to take prn.             Musculoskeletal and Integument    Costochondritis, " acute - Primary     Likely secondary to cough.  She reports cough is improving.  She has been advised to take ibuprofen OTC for pain and inflammation.  Also discussed use of topical creams or natural supplements for inflammation.  Discussed if she does not notice an improvement over the next few day to call.  She may need a rib x-ray.                No orders of the defined types were placed in this encounter.      No orders of the defined types were placed in this encounter.      Return if symptoms worsen or fail to improve.    Lashonda Weaver, DO

## 2019-10-26 NOTE — PATIENT INSTRUCTIONS
Costochondritis  Costochondritis is swelling and irritation (inflammation) of the tissue (cartilage) that connects your ribs to your breastbone (sternum). This causes pain in the front of your chest. Usually, the pain:  · Starts gradually.  · Is in more than one rib.  This condition usually goes away on its own over time.  Follow these instructions at home:  · Do not do anything that makes your pain worse.  · If directed, put ice on the painful area:  ? Put ice in a plastic bag.  ? Place a towel between your skin and the bag.  ? Leave the ice on for 20 minutes, 2-3 times a day.  · If directed, put heat on the affected area as often as told by your doctor. Use the heat source that your doctor tells you to use, such as a moist heat pack or a heating pad.  ? Place a towel between your skin and the heat source.  ? Leave the heat on for 20-30 minutes.  ? Take off the heat if your skin turns bright red. This is very important if you cannot feel pain, heat, or cold. You may have a greater risk of getting burned.  · Take over-the-counter and prescription medicines only as told by your doctor.  · Return to your normal activities as told by your doctor. Ask your doctor what activities are safe for you.  · Keep all follow-up visits as told by your doctor. This is important.  Contact a doctor if:  · You have chills or a fever.  · Your pain does not go away or it gets worse.  · You have a cough that does not go away.  Get help right away if:  · You are short of breath.  This information is not intended to replace advice given to you by your health care provider. Make sure you discuss any questions you have with your health care provider.  Document Released: 06/05/2009 Document Revised: 07/07/2017 Document Reviewed: 04/12/2017  [employer] Interactive Patient Education © 2019 [employer]

## 2019-11-01 ENCOUNTER — OFFICE VISIT (OUTPATIENT)
Dept: OBSTETRICS AND GYNECOLOGY | Facility: CLINIC | Age: 44
End: 2019-11-01

## 2019-11-01 VITALS
BODY MASS INDEX: 23.49 KG/M2 | SYSTOLIC BLOOD PRESSURE: 120 MMHG | HEIGHT: 65 IN | WEIGHT: 141 LBS | DIASTOLIC BLOOD PRESSURE: 78 MMHG

## 2019-11-01 DIAGNOSIS — B37.31 VULVOVAGINAL CANDIDIASIS: Primary | ICD-10-CM

## 2019-11-01 DIAGNOSIS — N89.8 VAGINAL DISCHARGE: ICD-10-CM

## 2019-11-01 PROCEDURE — 99213 OFFICE O/P EST LOW 20 MIN: CPT | Performed by: OBSTETRICS & GYNECOLOGY

## 2019-11-01 RX ORDER — FLUCONAZOLE 150 MG/1
TABLET ORAL
Qty: 2 TABLET | Refills: 0 | Status: SHIPPED | OUTPATIENT
Start: 2019-11-01 | End: 2019-12-26

## 2019-11-01 NOTE — PROGRESS NOTES
Chief Complaint   Patient presents with   • Vaginitis     Patient is here with c/o vaginal itching.       Lucille Linder is a 43 y.o. year old  presenting to be seen for vaginal itching.    She reports vaginal itching and discharge over the past several days.  No fevers or chills.  No additional symptoms.    Exam:  Mild erythema of the vulva and vaginal mucosa.  Thick, white vaginal discharge present.  Normal cervix.  Normal bimanual exam.    Assessment/Plan:  Vulvovaginal candidiasis    Diflucan 150 mg today, repeat in 3 days.  Vaginal cultures obtained.  Further management will be dictated by results.    Greater than 50% of this 15 minute visit was spent in face-to-face counseling and/or coordination of care for this patient.    Ki Alexander MD  Obstetrics and Gynecology  [facility]

## 2019-11-04 ENCOUNTER — TELEPHONE (OUTPATIENT)
Dept: INTERNAL MEDICINE | Facility: CLINIC | Age: 44
End: 2019-11-04

## 2019-11-04 ENCOUNTER — OFFICE VISIT (OUTPATIENT)
Dept: INTERNAL MEDICINE | Facility: CLINIC | Age: 44
End: 2019-11-04

## 2019-11-04 ENCOUNTER — HOSPITAL ENCOUNTER (OUTPATIENT)
Dept: GENERAL RADIOLOGY | Facility: HOSPITAL | Age: 44
Discharge: HOME | End: 2019-11-04
Admitting: INTERNAL MEDICINE

## 2019-11-04 VITALS
WEIGHT: 139 LBS | HEART RATE: 70 BPM | DIASTOLIC BLOOD PRESSURE: 74 MMHG | SYSTOLIC BLOOD PRESSURE: 113 MMHG | OXYGEN SATURATION: 99 % | HEIGHT: 65 IN | RESPIRATION RATE: 16 BRPM | TEMPERATURE: 98 F | BODY MASS INDEX: 23.16 KG/M2

## 2019-11-04 DIAGNOSIS — R07.81 RIB PAIN ON RIGHT SIDE: Primary | ICD-10-CM

## 2019-11-04 DIAGNOSIS — R05.9 COUGH: ICD-10-CM

## 2019-11-04 PROCEDURE — 99213 OFFICE O/P EST LOW 20 MIN: CPT | Performed by: INTERNAL MEDICINE

## 2019-11-04 PROCEDURE — 71101 X-RAY EXAM UNILAT RIBS/CHEST: CPT

## 2019-11-04 PROCEDURE — 96372 THER/PROPH/DIAG INJ SC/IM: CPT | Performed by: INTERNAL MEDICINE

## 2019-11-04 RX ORDER — METHYLPREDNISOLONE SODIUM SUCCINATE 125 MG/2ML
125 INJECTION, POWDER, LYOPHILIZED, FOR SOLUTION INTRAMUSCULAR; INTRAVENOUS ONCE
Status: COMPLETED | OUTPATIENT
Start: 2019-11-04 | End: 2019-11-04

## 2019-11-04 RX ADMIN — METHYLPREDNISOLONE SODIUM SUCCINATE 125 MG: 125 INJECTION, POWDER, LYOPHILIZED, FOR SOLUTION INTRAMUSCULAR; INTRAVENOUS at 17:39

## 2019-11-04 NOTE — TELEPHONE ENCOUNTER
Pt says she was in office a couple weeks ago for a cough and thinks she has a cracked a rib.  She hears something that sounds like cracking when she coughs even though she is not coughing as much.  Pt says she does not like taking pain meds but is requesting a call back to discuss things she should and shouldn't be doing to keep it from getting worse.

## 2019-11-04 NOTE — PROGRESS NOTES
Subjective   Lucille Linder is a 44 y.o. female.     Chief Complaint   Patient presents with   • Pain     right rib   • Cough       History of Present Illness   Patient has been complaining of pain in the right rib area, she states she has been having a cough for which she was taking over-the-counter medications and cough medication and was seen at the last visit and the records have been reviewed, but she notes states that the pain in the right rib area has been worsening and she has a conference today attended [place] this week    The following portions of the patient's history were reviewed and updated as appropriate: allergies, current medications, past family history, past medical history, past social history, past surgical history and problem list.    Review of Systems   Constitutional: Negative for appetite change, fatigue and fever.   HENT: Negative for congestion, ear discharge, ear pain, sinus pressure and sore throat.    Eyes: Negative for pain and discharge.   Respiratory: Positive for cough. Negative for chest tightness, shortness of breath and wheezing.         Right rib pain   Cardiovascular: Negative for chest pain, palpitations and leg swelling.   Gastrointestinal: Negative for abdominal pain, blood in stool, constipation, diarrhea and nausea.   Endocrine: Negative for cold intolerance and heat intolerance.   Genitourinary: Negative for dysuria, flank pain and frequency.   Musculoskeletal: Negative for back pain and joint swelling.   Skin: Negative for color change.   Allergic/Immunologic: Negative for environmental allergies and food allergies.   Neurological: Negative for dizziness, weakness, numbness and headaches.   Hematological: Negative for adenopathy. Does not bruise/bleed easily.   Psychiatric/Behavioral: Negative for behavioral problems and dysphoric mood. The patient is not nervous/anxious.          Current Outpatient Medications:   •  CRANBERRY JUICE EXTRACT PO, Take  by  "mouth Daily., Disp: , Rfl:   •  fluconazole (DIFLUCAN) 150 MG tablet, 1 po now and repeat in 3 d., Disp: 2 tablet, Rfl: 0  •  fluticasone (FLONASE) 50 MCG/ACT nasal spray, Instill 1 spray into each nostril twice daily angling the tip of the applicator towards the top of the ear on the same side, Disp: , Rfl: 11  •  levothyroxine (SYNTHROID, LEVOTHROID) 25 MCG tablet, Take 1 tablet by mouth Daily., Disp: 90 tablet, Rfl: 2  •  Probiotic Product (PROBIOTIC DAILY PO), Take  by mouth., Disp: , Rfl:   •  vitamin E 400 UNIT capsule, Take 400 Units by mouth Daily., Disp: , Rfl:     Current Facility-Administered Medications:   •  cyanocobalamin injection 1,000 mcg, 1,000 mcg, Intramuscular, Q28 Days, Paty Reinoso MD, 1,000 mcg at 07/07/17 1117  •  cyanocobalamin injection 1,000 mcg, 1,000 mcg, Intramuscular, Q28 Days, Paty Reinoso MD, 1,000 mcg at 01/11/19 1605  •  cyanocobalamin injection 1,000 mcg, 1,000 mcg, Intramuscular, Q28 Days, Paty Reinoso MD  •  cyanocobalamin injection 1,000 mcg, 1,000 mcg, Intramuscular, Q28 Days, Paty Reinoso MD, 1,000 mcg at 11/20/18 1607    Objective     Blood pressure 113/74, pulse 70, temperature 98 °F (36.7 °C), resp. rate 16, height 165.1 cm (65\"), weight 63 kg (139 lb), SpO2 99 %, not currently breastfeeding.    Physical Exam   Constitutional: She is oriented to person, place, and time. She appears well-developed and well-nourished. No distress.   HENT:   Head: Normocephalic and atraumatic.   Right Ear: External ear normal.   Left Ear: External ear normal.   Nose: Nose normal.   Mouth/Throat: Oropharynx is clear and moist.   Eyes: Conjunctivae and EOM are normal. Pupils are equal, round, and reactive to light.   Neck: Neck supple. No thyromegaly present.   Cardiovascular: Normal rate, regular rhythm and normal heart sounds.   Pulmonary/Chest: Effort normal and breath sounds normal. No respiratory distress.   Right rib lower margin reproducible tenderness on palpation   Abdominal: " Soft. Bowel sounds are normal. She exhibits no distension. There is no tenderness. There is no rebound.   Musculoskeletal: Normal range of motion. She exhibits no edema.   Lymphadenopathy:     She has no cervical adenopathy.   Neurological: She is alert and oriented to person, place, and time.   No gross motor or sensory deficits   Skin: Skin is warm. She is not diaphoretic.   Psychiatric: She has a normal mood and affect.   Nursing note and vitals reviewed.    Patient's Body mass index is 23.13 kg/m². BMI is within normal parameters. No follow-up required..      Results for orders placed or performed in visit on 11/01/19   NuSwab VG+ - Swab, Vagina   Result Value Ref Range    Atopobium Vaginae Low - 0 Score    BVAB 2 Low - 0 Score    Megasphaera 1 Low - 0 Score    Anai Albicans, SUNI Positive (A) Negative    Anai Glabrata, SUNI Negative Negative    Trichomonas vaginosis Negative Negative    Chlamydia trachomatis, SUNI Negative Negative    Neisseria gonorrhoeae, SUNI Negative Negative         Assessment/Plan   Lucille was seen today for pain and cough.    Diagnoses and all orders for this visit:    Rib pain on right side  -     XR Ribs Right With PA Chest  -     methylPREDNISolone sodium succinate (SOLU-Medrol) injection 125 mg    Cough  -     XR Ribs Right With PA Chest      Plan:  1.  Right rib pain: We will obtain x-rays, I m Solu-Medrol given in office today  2.cough: Improving, will continue with over-the-counter cough medications, will obtain x-ray           Ptay Reinoso MD

## 2019-12-26 ENCOUNTER — OFFICE VISIT (OUTPATIENT)
Dept: INTERNAL MEDICINE | Facility: CLINIC | Age: 44
End: 2019-12-26

## 2019-12-26 ENCOUNTER — RESULTS ENCOUNTER (OUTPATIENT)
Dept: INTERNAL MEDICINE | Facility: CLINIC | Age: 44
End: 2019-12-26

## 2019-12-26 VITALS
SYSTOLIC BLOOD PRESSURE: 109 MMHG | DIASTOLIC BLOOD PRESSURE: 74 MMHG | RESPIRATION RATE: 16 BRPM | BODY MASS INDEX: 23.99 KG/M2 | WEIGHT: 144 LBS | TEMPERATURE: 97.6 F | HEIGHT: 65 IN | HEART RATE: 62 BPM | OXYGEN SATURATION: 99 %

## 2019-12-26 DIAGNOSIS — N76.1 SUBACUTE VAGINITIS: Primary | ICD-10-CM

## 2019-12-26 DIAGNOSIS — R30.0 DYSURIA: ICD-10-CM

## 2019-12-26 DIAGNOSIS — B37.31 VULVOVAGINAL CANDIDIASIS: ICD-10-CM

## 2019-12-26 LAB
BILIRUB BLD-MCNC: NEGATIVE MG/DL
CLARITY, POC: CLEAR
COLOR UR: YELLOW
GLUCOSE UR STRIP-MCNC: NEGATIVE MG/DL
KETONES UR QL: NEGATIVE
LEUKOCYTE EST, POC: ABNORMAL
NITRITE UR-MCNC: NEGATIVE MG/ML
PH UR: 6.5 [PH] (ref 5–8)
PROT UR STRIP-MCNC: ABNORMAL MG/DL
RBC # UR STRIP: NEGATIVE /UL
SP GR UR: 1.01 (ref 1–1.03)
UROBILINOGEN UR QL: NORMAL

## 2019-12-26 PROCEDURE — 81003 URINALYSIS AUTO W/O SCOPE: CPT | Performed by: INTERNAL MEDICINE

## 2019-12-26 PROCEDURE — 99213 OFFICE O/P EST LOW 20 MIN: CPT | Performed by: INTERNAL MEDICINE

## 2019-12-26 RX ORDER — FLUCONAZOLE 100 MG/1
100 TABLET ORAL DAILY
Qty: 3 TABLET | Refills: 0 | Status: SHIPPED | OUTPATIENT
Start: 2019-12-26 | End: 2020-03-10

## 2019-12-26 NOTE — PROGRESS NOTES
Subjective   Lucille Linder is a 44 y.o. female.     Chief Complaint   Patient presents with   • Vaginitis       History of Present Illness   Patient is here complaining of vaginal discharge with burning while urinating since the past few days , she also had a recent candida infection  In august and November 2019 and saw gyn and was treated with diflucan, she has an IUD in place    The following portions of the patient's history were reviewed and updated as appropriate: allergies, current medications, past family history, past medical history, past social history, past surgical history and problem list.    Review of Systems   Constitutional: Negative for appetite change, fatigue and fever.   HENT: Negative for congestion, ear discharge, ear pain, sinus pressure and sore throat.    Eyes: Negative for pain and discharge.   Respiratory: Negative for cough, chest tightness, shortness of breath and wheezing.    Cardiovascular: Negative for chest pain, palpitations and leg swelling.   Gastrointestinal: Negative for abdominal pain, blood in stool, constipation, diarrhea and nausea.   Endocrine: Negative for cold intolerance and heat intolerance.   Genitourinary: Positive for dysuria and vaginal discharge. Negative for flank pain and frequency.   Musculoskeletal: Negative for back pain and joint swelling.   Skin: Negative for color change.   Allergic/Immunologic: Negative for environmental allergies and food allergies.   Neurological: Negative for dizziness, weakness, numbness and headaches.   Hematological: Negative for adenopathy. Does not bruise/bleed easily.   Psychiatric/Behavioral: Negative for behavioral problems and dysphoric mood. The patient is not nervous/anxious.          Current Outpatient Medications:   •  CRANBERRY JUICE EXTRACT PO, Take  by mouth Daily., Disp: , Rfl:   •  fluticasone (FLONASE) 50 MCG/ACT nasal spray, Instill 1 spray into each nostril twice daily angling the tip of the applicator  "towards the top of the ear on the same side, Disp: , Rfl: 11  •  levothyroxine (SYNTHROID, LEVOTHROID) 25 MCG tablet, Take 1 tablet by mouth Daily., Disp: 90 tablet, Rfl: 2  •  Probiotic Product (PROBIOTIC DAILY PO), Take  by mouth., Disp: , Rfl:   •  vitamin E 400 UNIT capsule, Take 400 Units by mouth Daily., Disp: , Rfl:   •  fluconazole (DIFLUCAN) 100 MG tablet, Take 1 tablet by mouth Daily., Disp: 3 tablet, Rfl: 0    Current Facility-Administered Medications:   •  cyanocobalamin injection 1,000 mcg, 1,000 mcg, Intramuscular, Q28 Days, Paty Reinoso MD, 1,000 mcg at 07/07/17 1117  •  cyanocobalamin injection 1,000 mcg, 1,000 mcg, Intramuscular, Q28 Days, Paty Reinoso MD, 1,000 mcg at 01/11/19 1605  •  cyanocobalamin injection 1,000 mcg, 1,000 mcg, Intramuscular, Q28 Days, Paty Reinoso MD  •  cyanocobalamin injection 1,000 mcg, 1,000 mcg, Intramuscular, Q28 Days, Paty Reinoso MD, 1,000 mcg at 11/20/18 1607    Objective     Blood pressure 109/74, pulse 62, temperature 97.6 °F (36.4 °C), resp. rate 16, height 165.1 cm (65\"), weight 65.3 kg (144 lb), SpO2 99 %, not currently breastfeeding.    Physical Exam   Constitutional: She is oriented to person, place, and time. She appears well-developed and well-nourished. No distress.   HENT:   Head: Normocephalic and atraumatic.   Right Ear: External ear normal.   Left Ear: External ear normal.   Nose: Nose normal.   Mouth/Throat: Oropharynx is clear and moist.   Eyes: Pupils are equal, round, and reactive to light. Conjunctivae and EOM are normal.   Neck: Neck supple. No thyromegaly present.   Cardiovascular: Normal rate, regular rhythm and normal heart sounds.   Pulmonary/Chest: Effort normal and breath sounds normal. No respiratory distress.   Abdominal: Soft. Bowel sounds are normal. She exhibits no distension. There is no tenderness. There is no rebound.   Musculoskeletal: Normal range of motion. She exhibits no edema.   Lymphadenopathy:     She has no cervical " adenopathy.   Neurological: She is alert and oriented to person, place, and time.   No gross motor or sensory deficits   Skin: Skin is warm. She is not diaphoretic.   Psychiatric: She has a normal mood and affect.   Nursing note and vitals reviewed.    Patient's Body mass index is 23.96 kg/m².        Results for orders placed or performed in visit on 11/01/19   NuSwab VG+ - Swab, Vagina   Result Value Ref Range    Atopobium Vaginae Low - 0 Score    BVAB 2 Low - 0 Score    Megasphaera 1 Low - 0 Score    Anai Albicans, SUNI Positive (A) Negative    Anai Glabrata, SUNI Negative Negative    Trichomonas vaginosis Negative Negative    Chlamydia trachomatis, SUNI Negative Negative    Neisseria gonorrhoeae, SUNI Negative Negative         Assessment/Plan   Lucille was seen today for vaginitis.    Diagnoses and all orders for this visit:    Subacute vaginitis    Dysuria  -     Urine Culture - , Urine, Clean Catch; Future    Vulvovaginal candidiasis    Other orders  -     fluconazole (DIFLUCAN) 100 MG tablet; Take 1 tablet by mouth Daily.    Plan:  1.dysuria: In office UA done, oral hydration, will order urine culture  2.subacute vaginitis: Most secondary to  3.vaginal candidiasis: We will start fluconazole, patient advised to follow-up with GYN             Paty Reinoso MD

## 2020-01-08 ENCOUNTER — TELEPHONE (OUTPATIENT)
Dept: INTERNAL MEDICINE | Facility: CLINIC | Age: 45
End: 2020-01-08

## 2020-01-08 NOTE — TELEPHONE ENCOUNTER
Patient called and stated she has not received a call from the office regarding her urine culture back in late December 2019.     Please give her a call at # [phone]

## 2020-01-23 ENCOUNTER — OFFICE VISIT (OUTPATIENT)
Dept: INTERNAL MEDICINE | Facility: CLINIC | Age: 45
End: 2020-01-23

## 2020-01-23 VITALS
TEMPERATURE: 98.2 F | OXYGEN SATURATION: 99 % | SYSTOLIC BLOOD PRESSURE: 125 MMHG | HEIGHT: 65 IN | RESPIRATION RATE: 16 BRPM | WEIGHT: 143 LBS | DIASTOLIC BLOOD PRESSURE: 74 MMHG | HEART RATE: 72 BPM | BODY MASS INDEX: 23.82 KG/M2

## 2020-01-23 DIAGNOSIS — Z00.00 ROUTINE GENERAL MEDICAL EXAMINATION AT A HEALTH CARE FACILITY: Primary | ICD-10-CM

## 2020-01-23 PROCEDURE — 99396 PREV VISIT EST AGE 40-64: CPT | Performed by: INTERNAL MEDICINE

## 2020-01-23 NOTE — PROGRESS NOTES
Subjective   Lucille Linder is a 44 y.o. female.     Chief Complaint   Patient presents with   • Annual Exam       History of Present Illness   Patient is here for a physical and due for labs     Review of Systems   Constitutional: Negative for appetite change, fatigue and fever.   HENT: Negative for congestion, ear discharge, ear pain, sinus pressure and sore throat.    Eyes: Negative for pain and discharge.   Respiratory: Negative for cough, chest tightness, shortness of breath and wheezing.    Cardiovascular: Negative for chest pain, palpitations and leg swelling.   Gastrointestinal: Negative for abdominal pain, blood in stool, constipation, diarrhea and nausea.   Endocrine: Negative for cold intolerance and heat intolerance.   Genitourinary: Negative for dysuria, flank pain and frequency.   Musculoskeletal: Negative for back pain and joint swelling.   Skin: Negative for color change.   Allergic/Immunologic: Negative for environmental allergies and food allergies.   Neurological: Negative for dizziness, weakness, numbness and headaches.   Hematological: Negative for adenopathy. Does not bruise/bleed easily.   Psychiatric/Behavioral: Negative for behavioral problems and dysphoric mood. The patient is not nervous/anxious.        Past Medical History:   Diagnosis Date   • Allergic    • B12 deficiency 2015   • Encounter for insertion of ParaGard IUD 03/2013   • Headache    • Hyperlipidemia    • Hypothyroidism 2015   • Restless leg syndrome 1995   • Snores    • Urinary tract infection        Past Surgical History:   Procedure Laterality Date   • COLONOSCOPY N/A 4/4/2017    Procedure: COLONOSCOPY WITH ANOSCOPY;  Surgeon: Cezar Greenberg MD;  Location: [facility] ENDOSCOPY;  Service:        Family History   Problem Relation Age of Onset   • Lung cancer Father    • Other Other         arteriosclerotic cardiovascular disease, cerebrovascular accident   • Breast cancer Other    • Breast cancer Paternal Grandmother   "  • Thyroid disease Sister    • Colon cancer Neg Hx         reports that she has never smoked. She has never used smokeless tobacco. She reports that she drinks about 12.0 standard drinks of alcohol per week. She reports that she does not use drugs.    No Known Allergies        Current Outpatient Medications:   •  CRANBERRY JUICE EXTRACT PO, Take  by mouth Daily., Disp: , Rfl:   •  fluticasone (FLONASE) 50 MCG/ACT nasal spray, Instill 1 spray into each nostril twice daily angling the tip of the applicator towards the top of the ear on the same side, Disp: , Rfl: 11  •  levothyroxine (SYNTHROID, LEVOTHROID) 25 MCG tablet, Take 1 tablet by mouth Daily., Disp: 90 tablet, Rfl: 2  •  Probiotic Product (PROBIOTIC DAILY PO), Take  by mouth., Disp: , Rfl:   •  vitamin E 400 UNIT capsule, Take 400 Units by mouth Daily., Disp: , Rfl:   •  fluconazole (DIFLUCAN) 100 MG tablet, Take 1 tablet by mouth Daily., Disp: 3 tablet, Rfl: 0    Current Facility-Administered Medications:   •  cyanocobalamin injection 1,000 mcg, 1,000 mcg, Intramuscular, Q28 Days, Paty Reinoso MD, 1,000 mcg at 07/07/17 1117  •  cyanocobalamin injection 1,000 mcg, 1,000 mcg, Intramuscular, Q28 Days, Paty Reinoso MD, 1,000 mcg at 01/11/19 1605  •  cyanocobalamin injection 1,000 mcg, 1,000 mcg, Intramuscular, Q28 Days, Paty Reinoso MD  •  cyanocobalamin injection 1,000 mcg, 1,000 mcg, Intramuscular, Q28 Days, Paty Reinoso MD, 1,000 mcg at 11/20/18 1607      Objective   Blood pressure 125/74, pulse 72, temperature 98.2 °F (36.8 °C), resp. rate 16, height 165.1 cm (65\"), weight 64.9 kg (143 lb), SpO2 99 %, not currently breastfeeding.    Physical Exam   Constitutional: She is oriented to person, place, and time. She appears well-developed and well-nourished. No distress.   HENT:   Head: Normocephalic and atraumatic.   Right Ear: External ear normal.   Left Ear: External ear normal.   Nose: Nose normal.   Mouth/Throat: Oropharynx is clear and moist. "   Eyes: Pupils are equal, round, and reactive to light. Conjunctivae and EOM are normal.   Neck: Neck supple. No thyromegaly present.   Cardiovascular: Normal rate, regular rhythm and normal heart sounds.   Pulmonary/Chest: Effort normal and breath sounds normal. No respiratory distress.   Abdominal: Soft. Bowel sounds are normal. She exhibits no distension. There is no tenderness. There is no rebound.   Musculoskeletal: Normal range of motion. She exhibits no edema.   Lymphadenopathy:     She has no cervical adenopathy.   Neurological: She is alert and oriented to person, place, and time.   No gross motor or sensory deficits   Skin: Skin is warm. She is not diaphoretic.   Psychiatric: She has a normal mood and affect.   Nursing note and vitals reviewed.      Patient's Body mass index is 23.8 kg/m². BMI is within normal parameters. No follow-up required..      Results for orders placed or performed in visit on 12/26/19   POCT urinalysis dipstick, automated   Result Value Ref Range    Color Yellow Yellow, Straw, Dark Yellow, Kristen    Clarity, UA Clear Clear    Specific Gravity  1.010 1.005 - 1.030    pH, Urine 6.5 5.0 - 8.0    Leukocytes 25 Skinny/ul (A) Negative    Nitrite, UA Negative Negative    Protein, POC 2+ (A) Negative mg/dL    Glucose, UA Negative Negative, 1000 mg/dL (3+) mg/dL    Ketones, UA Negative Negative    Urobilinogen, UA Normal Normal    Bilirubin Negative Negative    Blood, UA Negative Negative         Assessment/Plan   Lucille was seen today for annual exam.    Diagnoses and all orders for this visit:    Routine general medical examination at a health care facility  -     CBC & Differential  -     Comprehensive Metabolic Panel  -     Lipid Panel  -     Hemoglobin A1c  -     TSH  -     Vitamin B12  -     Vitamin D 25 Hydroxy      plan:  1.physical: Physical exam conducted today,  Will obtain fasting lab work,   Impression: healthy adult Patient. Currently, patient eats a healthy diet. Screening lab  work includes glucose, lipid profile , complete blood count and comprehensive panel . The risks and benefits of immunizations were discussed and immunizations are up to date. Advice and education were given regarding nutrition and aerobic exercise. Patient discussion: discussed with the patient.  Physical with preventive exam as well as age and risk appropriate counseling completed.   Patient Discussion: plan discussed with the patient, follow-up visit needed in one year. Medication Review and medication list updated , she follows up with gyn , Advised Monthly self breast assessment and annual breast imaging and Calcium, 600 mg/ Vit. D, 400 IU daily; regular weight-bearing exercise             Paty Reinoso MD

## 2020-01-25 ENCOUNTER — HOSPITAL ENCOUNTER (OUTPATIENT)
Dept: GENERAL RADIOLOGY | Facility: HOSPITAL | Age: 45
Discharge: HOME | End: 2020-01-25
Admitting: INTERNAL MEDICINE

## 2020-01-25 ENCOUNTER — OFFICE VISIT (OUTPATIENT)
Dept: INTERNAL MEDICINE | Facility: CLINIC | Age: 45
End: 2020-01-25

## 2020-01-25 VITALS
HEIGHT: 65 IN | OXYGEN SATURATION: 99 % | WEIGHT: 144 LBS | RESPIRATION RATE: 16 BRPM | HEART RATE: 70 BPM | BODY MASS INDEX: 23.99 KG/M2 | DIASTOLIC BLOOD PRESSURE: 75 MMHG | TEMPERATURE: 97.9 F | SYSTOLIC BLOOD PRESSURE: 126 MMHG

## 2020-01-25 DIAGNOSIS — M54.2 CERVICALGIA: Primary | ICD-10-CM

## 2020-01-25 DIAGNOSIS — M79.2 NEURALGIA: ICD-10-CM

## 2020-01-25 DIAGNOSIS — M62.838 MUSCLE SPASMS OF NECK: ICD-10-CM

## 2020-01-25 PROCEDURE — 72040 X-RAY EXAM NECK SPINE 2-3 VW: CPT

## 2020-01-25 PROCEDURE — 99214 OFFICE O/P EST MOD 30 MIN: CPT | Performed by: INTERNAL MEDICINE

## 2020-01-25 NOTE — PROGRESS NOTES
Subjective   Lucille Linder is a 44 y.o. female.     Chief Complaint   Patient presents with   • Neck Pain   • Numbness   • Spasms       History of Present Illness   Patient is here complaining of pain in the neck with muscle spasm since the past few years, she states she gets headaches related to it , she has numbness  And tingling in her right arm into in her fingers radiating more at night and it wakes her up at times,she has similar symptoms in the left side at times ,  She underwent physical therapy  For it last spring but now her symptoms are worsening    The following portions of the patient's history were reviewed and updated as appropriate: allergies, current medications, past family history, past medical history, past social history, past surgical history and problem list.    Review of Systems   Constitutional: Negative for appetite change, fatigue and fever.   HENT: Negative for congestion, ear discharge, ear pain, sinus pressure and sore throat.    Eyes: Negative for pain and discharge.   Respiratory: Negative for cough, chest tightness, shortness of breath and wheezing.    Cardiovascular: Negative for chest pain, palpitations and leg swelling.   Gastrointestinal: Negative for abdominal pain, blood in stool, constipation, diarrhea and nausea.   Endocrine: Negative for cold intolerance and heat intolerance.   Genitourinary: Negative for dysuria, flank pain and frequency.   Musculoskeletal: Positive for neck pain. Negative for back pain and joint swelling.        Spasm muscle   Skin: Negative for color change.   Allergic/Immunologic: Negative for environmental allergies and food allergies.   Neurological: Positive for numbness. Negative for dizziness, weakness and headaches.   Hematological: Negative for adenopathy. Does not bruise/bleed easily.   Psychiatric/Behavioral: Negative for behavioral problems and dysphoric mood. The patient is not nervous/anxious.          Current Outpatient  "Medications:   •  CRANBERRY JUICE EXTRACT PO, Take  by mouth Daily., Disp: , Rfl:   •  fluconazole (DIFLUCAN) 100 MG tablet, Take 1 tablet by mouth Daily., Disp: 3 tablet, Rfl: 0  •  fluticasone (FLONASE) 50 MCG/ACT nasal spray, Instill 1 spray into each nostril twice daily angling the tip of the applicator towards the top of the ear on the same side, Disp: , Rfl: 11  •  levothyroxine (SYNTHROID, LEVOTHROID) 25 MCG tablet, Take 1 tablet by mouth Daily., Disp: 90 tablet, Rfl: 2  •  Probiotic Product (PROBIOTIC DAILY PO), Take  by mouth., Disp: , Rfl:   •  vitamin E 400 UNIT capsule, Take 400 Units by mouth Daily., Disp: , Rfl:   •  PARAGARD INTRAUTERINE COPPER IU, by Intrauterine route., Disp: , Rfl:     Current Facility-Administered Medications:   •  cyanocobalamin injection 1,000 mcg, 1,000 mcg, Intramuscular, Q28 Days, Paty Reinoso MD, 1,000 mcg at 07/07/17 1117  •  cyanocobalamin injection 1,000 mcg, 1,000 mcg, Intramuscular, Q28 Days, Paty Reinoso MD, 1,000 mcg at 01/11/19 1605  •  cyanocobalamin injection 1,000 mcg, 1,000 mcg, Intramuscular, Q28 Days, Paty Reinoso MD  •  cyanocobalamin injection 1,000 mcg, 1,000 mcg, Intramuscular, Q28 Days, Paty Reinoso MD, 1,000 mcg at 11/20/18 1607    Objective     Blood pressure 126/75, pulse 70, temperature 97.9 °F (36.6 °C), resp. rate 16, height 165.1 cm (65\"), weight 65.3 kg (144 lb), SpO2 99 %, not currently breastfeeding.    Physical Exam   Constitutional: She is oriented to person, place, and time. She appears well-developed and well-nourished. No distress.   HENT:   Head: Normocephalic and atraumatic.   Right Ear: External ear normal.   Left Ear: External ear normal.   Nose: Nose normal.   Mouth/Throat: Oropharynx is clear and moist.   Eyes: Pupils are equal, round, and reactive to light. Conjunctivae and EOM are normal.   Neck: Neck supple. No thyromegaly present.   Cardiovascular: Normal rate, regular rhythm and normal heart sounds.   Pulmonary/Chest: " Effort normal and breath sounds normal. No respiratory distress.   Abdominal: Soft. Bowel sounds are normal. She exhibits no distension. There is no tenderness. There is no rebound.   Musculoskeletal: Normal range of motion. She exhibits tenderness. She exhibits no edema.   cervical   Lymphadenopathy:     She has no cervical adenopathy.   Neurological: She is alert and oriented to person, place, and time.   No gross motor or sensory deficits   Skin: Skin is warm. She is not diaphoretic.   Psychiatric: She has a normal mood and affect.   Nursing note and vitals reviewed.    Patient's Body mass index is 23.96 kg/m². BMI is within normal parameters. No follow-up required..      Results for orders placed or performed in visit on 12/26/19   POCT urinalysis dipstick, automated   Result Value Ref Range    Color Yellow Yellow, Straw, Dark Yellow, Kristen    Clarity, UA Clear Clear    Specific Gravity  1.010 1.005 - 1.030    pH, Urine 6.5 5.0 - 8.0    Leukocytes 25 Skinny/ul (A) Negative    Nitrite, UA Negative Negative    Protein, POC 2+ (A) Negative mg/dL    Glucose, UA Negative Negative, 1000 mg/dL (3+) mg/dL    Ketones, UA Negative Negative    Urobilinogen, UA Normal Normal    Bilirubin Negative Negative    Blood, UA Negative Negative         Assessment/Plan   Lucille was seen today for neck pain, numbness and spasms.    Diagnoses and all orders for this visit:    Cervicalgia  -     XR Spine Cervical 2 or 3 View  -     MRI Cervical Spine Without Contrast  -     Ambulatory Referral to Neurosurgery    Muscle spasms of neck    Neuralgia  -     MRI Cervical Spine Without Contrast  -     Ambulatory Referral to Neurosurgery    Plan:  1.cervicalgia : We will get x-ray, MRI and refer patient to neurosurgery  2.  Muscle spasm: We will monitor, most secondary to above   3.  Neuralgia: We will get MRI and refer patient to neurosurgery             Paty Reinoso MD

## 2020-01-29 ENCOUNTER — OFFICE VISIT (OUTPATIENT)
Dept: OBSTETRICS AND GYNECOLOGY | Facility: CLINIC | Age: 45
End: 2020-01-29

## 2020-01-29 VITALS
SYSTOLIC BLOOD PRESSURE: 118 MMHG | HEIGHT: 65 IN | WEIGHT: 144 LBS | BODY MASS INDEX: 23.99 KG/M2 | DIASTOLIC BLOOD PRESSURE: 62 MMHG

## 2020-01-29 DIAGNOSIS — Z01.419 ENCOUNTER FOR GYNECOLOGICAL EXAMINATION (GENERAL) (ROUTINE) WITHOUT ABNORMAL FINDINGS: Primary | ICD-10-CM

## 2020-01-29 DIAGNOSIS — Z12.39 ENCOUNTER FOR BREAST CANCER SCREENING OTHER THAN MAMMOGRAM: ICD-10-CM

## 2020-01-29 DIAGNOSIS — N76.0 ACUTE VAGINITIS: ICD-10-CM

## 2020-01-29 PROCEDURE — 99396 PREV VISIT EST AGE 40-64: CPT | Performed by: OBSTETRICS & GYNECOLOGY

## 2020-01-30 ENCOUNTER — APPOINTMENT (OUTPATIENT)
Dept: LAB | Facility: HOSPITAL | Age: 45
End: 2020-01-30

## 2020-01-30 LAB
25(OH)D3 SERPL-MCNC: 21.2 NG/ML (ref 30–100)
ALBUMIN SERPL-MCNC: 4.6 G/DL (ref 3.5–5.2)
ALBUMIN/GLOB SERPL: 2.1 G/DL
ALP SERPL-CCNC: 49 U/L (ref 39–117)
ALT SERPL W P-5'-P-CCNC: 14 U/L (ref 1–33)
ANION GAP SERPL CALCULATED.3IONS-SCNC: 14.3 MMOL/L (ref 5–15)
AST SERPL-CCNC: 21 U/L (ref 1–32)
BASOPHILS # BLD AUTO: 0.05 10*3/MM3 (ref 0–0.2)
BASOPHILS NFR BLD AUTO: 0.8 % (ref 0–1.5)
BILIRUB SERPL-MCNC: 1.9 MG/DL (ref 0.2–1.2)
BUN BLD-MCNC: 12 MG/DL (ref 6–20)
BUN/CREAT SERPL: 12.9 (ref 7–25)
CALCIUM SPEC-SCNC: 9 MG/DL (ref 8.6–10.5)
CHLORIDE SERPL-SCNC: 104 MMOL/L (ref 98–107)
CHOLEST SERPL-MCNC: 178 MG/DL (ref 0–200)
CO2 SERPL-SCNC: 21.7 MMOL/L (ref 22–29)
CREAT BLD-MCNC: 0.93 MG/DL (ref 0.57–1)
DEPRECATED RDW RBC AUTO: 39.5 FL (ref 37–54)
EOSINOPHIL # BLD AUTO: 0.2 10*3/MM3 (ref 0–0.4)
EOSINOPHIL NFR BLD AUTO: 3.3 % (ref 0.3–6.2)
ERYTHROCYTE [DISTWIDTH] IN BLOOD BY AUTOMATED COUNT: 12.1 % (ref 12.3–15.4)
GFR SERPL CREATININE-BSD FRML MDRD: 66 ML/MIN/1.73
GLOBULIN UR ELPH-MCNC: 2.2 GM/DL
GLUCOSE BLD-MCNC: 102 MG/DL (ref 65–99)
HBA1C MFR BLD: 5.2 % (ref 4.8–5.6)
HCT VFR BLD AUTO: 39.1 % (ref 34–46.6)
HDLC SERPL-MCNC: 76 MG/DL (ref 40–60)
HGB BLD-MCNC: 13.8 G/DL (ref 12–15.9)
IMM GRANULOCYTES # BLD AUTO: 0.01 10*3/MM3 (ref 0–0.05)
IMM GRANULOCYTES NFR BLD AUTO: 0.2 % (ref 0–0.5)
LDLC SERPL CALC-MCNC: 88 MG/DL (ref 0–100)
LDLC/HDLC SERPL: 1.16 {RATIO}
LYMPHOCYTES # BLD AUTO: 2.02 10*3/MM3 (ref 0.7–3.1)
LYMPHOCYTES NFR BLD AUTO: 33.7 % (ref 19.6–45.3)
MCH RBC QN AUTO: 31.9 PG (ref 26.6–33)
MCHC RBC AUTO-ENTMCNC: 35.3 G/DL (ref 31.5–35.7)
MCV RBC AUTO: 90.3 FL (ref 79–97)
MONOCYTES # BLD AUTO: 0.51 10*3/MM3 (ref 0.1–0.9)
MONOCYTES NFR BLD AUTO: 8.5 % (ref 5–12)
NEUTROPHILS # BLD AUTO: 3.2 10*3/MM3 (ref 1.7–7)
NEUTROPHILS NFR BLD AUTO: 53.5 % (ref 42.7–76)
NRBC BLD AUTO-RTO: 0 /100 WBC (ref 0–0.2)
PLATELET # BLD AUTO: 267 10*3/MM3 (ref 140–450)
PMV BLD AUTO: 10.2 FL (ref 6–12)
POTASSIUM BLD-SCNC: 3.8 MMOL/L (ref 3.5–5.2)
PROT SERPL-MCNC: 6.8 G/DL (ref 6–8.5)
RBC # BLD AUTO: 4.33 10*6/MM3 (ref 3.77–5.28)
SODIUM BLD-SCNC: 140 MMOL/L (ref 136–145)
TRIGL SERPL-MCNC: 69 MG/DL (ref 0–150)
TSH SERPL DL<=0.05 MIU/L-ACNC: 3.8 UIU/ML (ref 0.27–4.2)
VIT B12 BLD-MCNC: 478 PG/ML (ref 211–946)
VLDLC SERPL-MCNC: 13.8 MG/DL (ref 5–40)
WBC NRBC COR # BLD: 5.99 10*3/MM3 (ref 3.4–10.8)

## 2020-01-30 PROCEDURE — 84443 ASSAY THYROID STIM HORMONE: CPT | Performed by: INTERNAL MEDICINE

## 2020-01-30 PROCEDURE — 82607 VITAMIN B-12: CPT | Performed by: INTERNAL MEDICINE

## 2020-01-30 PROCEDURE — 80053 COMPREHEN METABOLIC PANEL: CPT | Performed by: INTERNAL MEDICINE

## 2020-01-30 PROCEDURE — 80061 LIPID PANEL: CPT | Performed by: INTERNAL MEDICINE

## 2020-01-30 PROCEDURE — 83036 HEMOGLOBIN GLYCOSYLATED A1C: CPT | Performed by: INTERNAL MEDICINE

## 2020-01-30 PROCEDURE — 82306 VITAMIN D 25 HYDROXY: CPT | Performed by: INTERNAL MEDICINE

## 2020-01-30 PROCEDURE — 85025 COMPLETE CBC W/AUTO DIFF WBC: CPT | Performed by: INTERNAL MEDICINE

## 2020-01-30 PROCEDURE — 36415 COLL VENOUS BLD VENIPUNCTURE: CPT | Performed by: INTERNAL MEDICINE

## 2020-01-31 RX ORDER — ERGOCALCIFEROL 1.25 MG/1
50000 CAPSULE ORAL WEEKLY
Qty: 8 CAPSULE | Refills: 0 | Status: SHIPPED | OUTPATIENT
Start: 2020-01-31 | End: 2020-04-29

## 2020-02-06 DIAGNOSIS — Z01.419 ENCOUNTER FOR GYNECOLOGICAL EXAMINATION (GENERAL) (ROUTINE) WITHOUT ABNORMAL FINDINGS: ICD-10-CM

## 2020-02-07 ENCOUNTER — HOSPITAL ENCOUNTER (OUTPATIENT)
Dept: MRI IMAGING | Facility: HOSPITAL | Age: 45
Discharge: HOME | End: 2020-02-07
Admitting: INTERNAL MEDICINE

## 2020-02-07 PROCEDURE — 72141 MRI NECK SPINE W/O DYE: CPT

## 2020-02-10 RX ORDER — FLUCONAZOLE 150 MG/1
TABLET ORAL
Qty: 2 TABLET | Refills: 0 | Status: SHIPPED | OUTPATIENT
Start: 2020-02-10 | End: 2020-03-10

## 2020-02-13 ENCOUNTER — TELEPHONE (OUTPATIENT)
Dept: INTERNAL MEDICINE | Facility: CLINIC | Age: 45
End: 2020-02-13

## 2020-02-13 NOTE — TELEPHONE ENCOUNTER
PT CALLED AND STATED THAT SHE HAS CONCERNS ABOUT HER MOST RECENT LAB REGARDING HER GLUCOSE BEING HIGH  -   SHE STATES THAT SHE HAS 4 YEAST INFECTIONS IN THE LAST 6 MONTHS  - DOES THIS AFFECT HER SUGAR?  CAN SHE DO ANYTHING WITH HER DIET?  COULD THE SUGAR BE CAUSING THE YEAST INFECTIONS?       PT CALL BACK [phone]    SADE GUZMÁN

## 2020-03-10 ENCOUNTER — OFFICE VISIT (OUTPATIENT)
Dept: NEUROSURGERY | Facility: CLINIC | Age: 45
End: 2020-03-10

## 2020-03-10 VITALS — BODY MASS INDEX: 23.59 KG/M2 | WEIGHT: 141.6 LBS | TEMPERATURE: 97.8 F | HEIGHT: 65 IN | RESPIRATION RATE: 15 BRPM

## 2020-03-10 DIAGNOSIS — G58.9 ENTRAPMENT OF PERIPHERAL NERVE: Primary | ICD-10-CM

## 2020-03-10 DIAGNOSIS — M54.9 MECHANICAL BACK PAIN: ICD-10-CM

## 2020-03-10 DIAGNOSIS — M50.30 DDD (DEGENERATIVE DISC DISEASE), CERVICAL: ICD-10-CM

## 2020-03-10 PROBLEM — H93.13 BILATERAL TINNITUS: Status: ACTIVE | Noted: 2019-04-18

## 2020-03-10 PROBLEM — H69.83 DYSFUNCTION OF BOTH EUSTACHIAN TUBES: Status: ACTIVE | Noted: 2019-04-18

## 2020-03-10 PROBLEM — H69.93 DYSFUNCTION OF BOTH EUSTACHIAN TUBES: Status: ACTIVE | Noted: 2019-04-18

## 2020-03-10 PROCEDURE — 99243 OFF/OP CNSLTJ NEW/EST LOW 30: CPT | Performed by: NEUROLOGICAL SURGERY

## 2020-03-10 NOTE — PROGRESS NOTES
Patient: Lucille Linder  : 1975    Primary Care Provider: Paty Reinoso MD    Requesting Provider: As above        History    Chief Complaint: Neck pain with sensory alteration involving the arms and hands.    History of Present Illness: Ms. Linder is a 44-year-old  who for many years has had neck stiffness and discomfort.  She also has chronic low back pain.  Every few years she undergoes some therapy for that.  For about 3 years her arms will go to sleep at night.  This is not so much the case during the day.  She does not really describe radicular arm symptoms.  She does have some tension headache.  Stretching and massage can be helpful for her neck at times.  She is right-handed.    Review of Systems   Constitutional: Negative for activity change, appetite change, chills, diaphoresis, fatigue, fever and unexpected weight change.   HENT: Positive for congestion, postnasal drip, rhinorrhea and sneezing. Negative for dental problem, drooling, ear discharge, ear pain, facial swelling, hearing loss, mouth sores, nosebleeds, sinus pressure, sore throat, tinnitus, trouble swallowing and voice change.    Eyes: Positive for itching. Negative for photophobia, pain, discharge, redness and visual disturbance.   Respiratory: Negative for apnea, cough, choking, chest tightness, shortness of breath, wheezing and stridor.    Cardiovascular: Negative for chest pain, palpitations and leg swelling.   Gastrointestinal: Negative for abdominal distention, abdominal pain, anal bleeding, blood in stool, constipation, diarrhea, nausea, rectal pain and vomiting.   Endocrine: Negative for cold intolerance, heat intolerance, polydipsia, polyphagia and polyuria.   Genitourinary: Negative for decreased urine volume, difficulty urinating, dysuria, enuresis, flank pain, frequency, genital sores, hematuria and urgency.   Musculoskeletal: Positive for myalgias, neck pain and neck stiffness. Negative for  "arthralgias, back pain, gait problem and joint swelling.   Skin: Negative for color change, pallor, rash and wound.   Allergic/Immunologic: Positive for environmental allergies. Negative for food allergies and immunocompromised state.   Neurological: Negative for dizziness, tremors, seizures, syncope, facial asymmetry, speech difficulty, weakness, light-headedness, numbness and headaches.   Hematological: Negative for adenopathy. Does not bruise/bleed easily.   Psychiatric/Behavioral: Negative for agitation, behavioral problems, confusion, decreased concentration, dysphoric mood, hallucinations, self-injury, sleep disturbance and suicidal ideas. The patient is not nervous/anxious and is not hyperactive.    All other systems reviewed and are negative.      The patient's past medical history, past surgical history, family history, and social history have been reviewed at length in the electronic medical record.    Physical Exam:   Temp 97.8 °F (36.6 °C) (Temporal)   Resp 15   Ht 165.1 cm (65\")   Wt 64.2 kg (141 lb 9.6 oz)   BMI 23.56 kg/m²   CONSTITUTIONAL: Patient is well-nourished, pleasant and appears stated age.  CV: Heart regular rate and rhythm without murmur, rub, or gallop.  PULMONARY: Lungs are clear to ascultation.  MUSCULOSKELETAL:  Neck tenderness to palpation is not observed.   ROM in neck is largely preserved.  There is no tenderness over the ulnar grooves.  NEUROLOGICAL:  Orientation, memory, attention span, language function, and cognition have been examined and are intact.  Strength is intact in the upper and lower extremities to direct testing.  Muscle tone is normal throughout.  Station and gait are normal.  Sensation is intact to light touch testing throughout.  Deep tendon reflexes are 2+ and symmetrical.  Joi's Sign is negative bilaterally. No clonus is elicited.  Coordination is intact.      Medical Decision Making    Data Review:   MRI of the cervical spine dated 2/7/2020 demonstrates " some diffuse degenerative disc disease and mild loss of the normal lordosis.  Root or cord compromise is not observed.    Diagnosis:   1.  Mechanical neck pain.  2.  Chronic low back pain.  3.  Probable peripheral nerve entrapment syndrome.    Treatment Options:   I have referred Ms. Linder for electrodiagnostic studies of both upper extremities.  She will follow-up thereafter.  Treatment for her neck and back will likely need to be symptomatic.       Diagnosis Plan   1. Entrapment of peripheral nerve  EMG & Nerve Conduction Test   2. Mechanical neck/back pain     3. DDD (degenerative disc disease), cervical         Scribed for Pawan Vidales MD by Maureen Manuel CMA on 3/10/2020 10:51       I, Dr. Vidales, personally performed the services described in the documentation, as scribed in my presence, and it is both accurate and complete.

## 2020-03-23 ENCOUNTER — HOSPITAL ENCOUNTER (OUTPATIENT)
Dept: MAMMOGRAPHY | Facility: HOSPITAL | Age: 45
End: 2020-03-23

## 2020-03-24 ENCOUNTER — TELEPHONE (OUTPATIENT)
Dept: OBSTETRICS AND GYNECOLOGY | Facility: CLINIC | Age: 45
End: 2020-03-24

## 2020-03-24 NOTE — TELEPHONE ENCOUNTER
----- Message from Carlene Jones sent at 3/24/2020  4:13 PM EDT -----  Contact: PT  THIS IS DR BAER'S PT.  SHE CALLED REQUESTING RX FOR A YEAST INFECTION.  SHE USES [facility].  THANKS

## 2020-03-25 RX ORDER — FLUCONAZOLE 150 MG/1
TABLET ORAL
Qty: 2 TABLET | Refills: 0 | Status: SHIPPED | OUTPATIENT
Start: 2020-03-25 | End: 2020-05-15

## 2020-04-16 ENCOUNTER — TELEPHONE (OUTPATIENT)
Dept: OBSTETRICS AND GYNECOLOGY | Facility: CLINIC | Age: 45
End: 2020-04-16

## 2020-04-16 RX ORDER — FLUCONAZOLE 150 MG/1
TABLET ORAL
Qty: 2 TABLET | Refills: 0 | Status: SHIPPED | OUTPATIENT
Start: 2020-04-16 | End: 2020-05-15

## 2020-04-16 RX ORDER — CLOTRIMAZOLE AND BETAMETHASONE DIPROPIONATE 10; .64 MG/G; MG/G
CREAM TOPICAL 2 TIMES DAILY
Qty: 45 G | Refills: 0 | OUTPATIENT
Start: 2020-04-16 | End: 2020-07-29

## 2020-04-16 NOTE — TELEPHONE ENCOUNTER
Patient called complaining of vaginal itching and burning.  She states she has not noticed any vaginal odor.  She is requesting an RX to treat her symptoms.    [facility]    [phone]

## 2020-04-21 ENCOUNTER — TELEPHONE (OUTPATIENT)
Dept: OBSTETRICS AND GYNECOLOGY | Facility: CLINIC | Age: 45
End: 2020-04-21

## 2020-04-21 NOTE — TELEPHONE ENCOUNTER
----- Message from Carlene Jones sent at 4/21/2020  3:34 PM EDT -----  Contact: pt  THIS IS DR BAER'S PT.  SHE CALLED STATING SHE FINISHED THE DIFLUCAN AND IS STILL HAVING SYMPTOMS OF ITCHING AND BURNING.  DOES SHE NEED A DIFFERENT RX?  PLEASE CALL HER BACK [phone].  THANKS

## 2020-04-29 ENCOUNTER — TELEMEDICINE (OUTPATIENT)
Dept: INTERNAL MEDICINE | Facility: CLINIC | Age: 45
End: 2020-04-29

## 2020-04-29 ENCOUNTER — TELEPHONE (OUTPATIENT)
Dept: INTERNAL MEDICINE | Facility: CLINIC | Age: 45
End: 2020-04-29

## 2020-04-29 DIAGNOSIS — T63.441A BEE STING REACTION, ACCIDENTAL OR UNINTENTIONAL, INITIAL ENCOUNTER: ICD-10-CM

## 2020-04-29 DIAGNOSIS — R22.0 SWELLING OF LEFT SIDE OF FACE: Primary | ICD-10-CM

## 2020-04-29 PROCEDURE — 99213 OFFICE O/P EST LOW 20 MIN: CPT | Performed by: INTERNAL MEDICINE

## 2020-04-29 RX ORDER — METHYLPREDNISOLONE 4 MG/1
TABLET ORAL
Qty: 21 TABLET | Refills: 0 | Status: SHIPPED | OUTPATIENT
Start: 2020-04-29 | End: 2020-05-15

## 2020-04-29 NOTE — PROGRESS NOTES
Subjective   Lucille Linder is a 44 y.o. female.     Chief Complaint   Patient presents with   • Insect Bite     bee sting yesterday to left side of face.swelling and itching today       History of Present Illness   You have chosen to receive care through a telehealth visit.  Do you consent to use a video/audio connection for your medical care today? Yes  The following are involved in this visit Leonor Cox LPN, Dr Ramos and the patient  Video call with patient today, she states she was sitting on her deck and there was a beat that got into her hair and she was trying to get it out and it stung her on the left side of her face below left eye and then this morning she started having swelling below her left eye and over the cheek area and itching, patient is afebrile    The following portions of the patient's history were reviewed and updated as appropriate: allergies, current medications, past family history, past medical history, past social history, past surgical history and problem list.    Review of Systems   Constitutional: Negative for appetite change, fatigue and fever.   HENT: Negative for congestion, ear discharge, ear pain, sinus pressure and sore throat.    Eyes: Negative for pain and discharge.   Respiratory: Negative for cough, chest tightness, shortness of breath and wheezing.    Cardiovascular: Negative for chest pain, palpitations and leg swelling.   Gastrointestinal: Negative for abdominal pain, blood in stool, constipation, diarrhea and nausea.   Endocrine: Negative for cold intolerance and heat intolerance.   Genitourinary: Negative for dysuria, flank pain and frequency.   Musculoskeletal: Negative for back pain and joint swelling.   Skin: Negative for color change.        Left facial swelling   Allergic/Immunologic: Negative for environmental allergies and food allergies.   Neurological: Negative for dizziness, weakness, numbness and headaches.   Hematological: Negative for  adenopathy. Does not bruise/bleed easily.   Psychiatric/Behavioral: Negative for behavioral problems and dysphoric mood. The patient is not nervous/anxious.          Current Outpatient Medications:   •  clotrimazole-betamethasone (Lotrisone) 1-0.05 % cream, Apply  topically to the appropriate area as directed 2 (Two) Times a Day., Disp: 45 g, Rfl: 0  •  CRANBERRY JUICE EXTRACT PO, Take  by mouth Daily., Disp: , Rfl:   •  fluconazole (Diflucan) 150 MG tablet, Take one pill now and repeat in 72 hours, Disp: 2 tablet, Rfl: 0  •  fluconazole (Diflucan) 150 MG tablet, 1 po now and repeat in 3 d., Disp: 2 tablet, Rfl: 0  •  levothyroxine (SYNTHROID, LEVOTHROID) 25 MCG tablet, Take 1 tablet by mouth Daily., Disp: 90 tablet, Rfl: 2  •  PARAGARD INTRAUTERINE COPPER IU, by Intrauterine route., Disp: , Rfl:   •  Probiotic Product (PROBIOTIC DAILY PO), Take  by mouth., Disp: , Rfl:   •  terconazole (Terazol 7) 0.4 % vaginal cream, Insert 1 applicator into the vagina Every Night for 7 days., Disp: 1 each, Rfl: 0  •  vitamin E 400 UNIT capsule, Take 400 Units by mouth Daily., Disp: , Rfl:   •  methylPREDNISolone (MEDROL, PRISCILLA,) 4 MG tablet, Take as directed on package instructions., Disp: 21 tablet, Rfl: 0    Objective     not currently breastfeeding.    Physical Exam  All vitals recorded within this visit are reported by the patient.  General appearance: Normocephalic and nontraumatic  Left facial swelling below left eye and in the left zygomatic area  HEENT: Appears benign, hearing appears intact  Neck: Appears supple  Respiratory: Effort appears normal  Musculoskeletal: Moving all limbs  CNS: No gross motor or sensory deficits  Psychiatry: Alert and oriented x3  Memory appears intact  Mood and affect appears normal  Insight appears normal        Results for orders placed or performed in visit on 01/29/20   NuSwab VG+ - Swab, Vagina   Result Value Ref Range    Atopobium Vaginae Low - 0 Score    BVAB 2 Low - 0 Score     Megasphaera 1 Low - 0 Score    Anai Albicans, SUNI Positive (A) Negative    Anai Glabrata, SUNI Negative Negative    Trichomonas vaginosis Negative Negative    Chlamydia trachomatis, SUNI Negative Negative    Neisseria gonorrhoeae, SUNI Negative Negative         Assessment/Plan   Lucille was seen today for insect bite.    Diagnoses and all orders for this visit:    Swelling of left side of face    Bee sting reaction, accidental or unintentional, initial encounter    Other orders  -     methylPREDNISolone (MEDROL, PRISCILLA,) 4 MG tablet; Take as directed on package instructions.    Plan:  1.left facial swelling: We will start Benadryl and Medrol Dosepak  2.bee sting: Will start Medrol Dosepak  Patient advised to call the clinic or go to the ER if her symptoms worsen    This was an audio and video enabled telemedicine encounter.           Paty Reinoso MD

## 2020-04-29 NOTE — TELEPHONE ENCOUNTER
PT STATED A BEE STUNG HER ON THE LEFT SIDE OF HER CHEEK YESTERDAY.  PLEASE CALL PATIENT ABOUT WHAT TO PUT ON THE STING OR WHAT TO DO FOR IT    PT CALL BACK [phone]

## 2020-05-06 ENCOUNTER — APPOINTMENT (OUTPATIENT)
Dept: MAMMOGRAPHY | Facility: HOSPITAL | Age: 45
End: 2020-05-06

## 2020-05-07 ENCOUNTER — HOSPITAL ENCOUNTER (OUTPATIENT)
Dept: MAMMOGRAPHY | Facility: HOSPITAL | Age: 45
Discharge: HOME | End: 2020-05-07
Admitting: OBSTETRICS & GYNECOLOGY

## 2020-05-07 DIAGNOSIS — Z12.39 ENCOUNTER FOR BREAST CANCER SCREENING OTHER THAN MAMMOGRAM: ICD-10-CM

## 2020-05-07 PROCEDURE — 77067 SCR MAMMO BI INCL CAD: CPT

## 2020-05-07 PROCEDURE — 77063 BREAST TOMOSYNTHESIS BI: CPT

## 2020-05-15 ENCOUNTER — OFFICE VISIT (OUTPATIENT)
Dept: OBSTETRICS AND GYNECOLOGY | Facility: CLINIC | Age: 45
End: 2020-05-15

## 2020-05-15 VITALS
BODY MASS INDEX: 24.49 KG/M2 | SYSTOLIC BLOOD PRESSURE: 116 MMHG | WEIGHT: 147 LBS | DIASTOLIC BLOOD PRESSURE: 60 MMHG | HEIGHT: 65 IN

## 2020-05-15 DIAGNOSIS — N76.0 ACUTE VAGINITIS: Primary | ICD-10-CM

## 2020-05-15 DIAGNOSIS — N95.2 POSTMENOPAUSAL ATROPHIC VAGINITIS: ICD-10-CM

## 2020-05-15 DIAGNOSIS — R30.9 PAIN WITH URINATION: ICD-10-CM

## 2020-05-15 PROCEDURE — 99214 OFFICE O/P EST MOD 30 MIN: CPT | Performed by: OBSTETRICS & GYNECOLOGY

## 2020-05-15 RX ORDER — ESTRADIOL 0.1 MG/G
CREAM VAGINAL
Qty: 1 EACH | Refills: 11 | Status: SHIPPED | OUTPATIENT
Start: 2020-05-15 | End: 2021-01-14

## 2020-05-15 RX ORDER — METRONIDAZOLE 500 MG/1
500 TABLET ORAL 2 TIMES DAILY
Qty: 14 TABLET | Refills: 0 | Status: SHIPPED | OUTPATIENT
Start: 2020-05-15 | End: 2020-05-22

## 2020-05-20 ENCOUNTER — TELEPHONE (OUTPATIENT)
Dept: OBSTETRICS AND GYNECOLOGY | Facility: CLINIC | Age: 45
End: 2020-05-20

## 2020-05-20 RX ORDER — FLUCONAZOLE 150 MG/1
TABLET ORAL
Qty: 2 TABLET | Refills: 0 | Status: SHIPPED | OUTPATIENT
Start: 2020-05-20 | End: 2020-11-19

## 2020-05-20 NOTE — TELEPHONE ENCOUNTER
Patient called and advised she is 5 days into the Flagyl and now feels like she has a yeast infection. She advised she is having a thick white discharge with a lot of irritation. Patient wanted to see if we can call in yeast treatment also while awaiting culture results.

## 2020-05-29 ENCOUNTER — OFFICE VISIT (OUTPATIENT)
Dept: NEUROSURGERY | Facility: CLINIC | Age: 45
End: 2020-05-29

## 2020-05-29 VITALS — HEIGHT: 65 IN | BODY MASS INDEX: 23.99 KG/M2 | TEMPERATURE: 98 F | WEIGHT: 144 LBS

## 2020-05-29 DIAGNOSIS — M54.9 MECHANICAL BACK PAIN: ICD-10-CM

## 2020-05-29 DIAGNOSIS — G56.01 CARPAL TUNNEL SYNDROME OF RIGHT WRIST: Primary | ICD-10-CM

## 2020-05-29 DIAGNOSIS — M50.30 DDD (DEGENERATIVE DISC DISEASE), CERVICAL: ICD-10-CM

## 2020-05-29 PROCEDURE — 99213 OFFICE O/P EST LOW 20 MIN: CPT | Performed by: NEUROLOGICAL SURGERY

## 2020-05-29 NOTE — PROGRESS NOTES
Patient: Lucille Linder  : 1975    Primary Care Provider: Paty Reinoso MD    Requesting Provider: As above        History    Chief Complaint: Neck pain with sensory alteration involving the arms and hands.    History of Present Illness: Ms. Linder is a 43-year-old  who has had some neck stiffness and discomfort chronically.  She also has some chronic low back pain.  Every so often she undergoes some therapy for the symptoms.  For about 3 years her arms will go to sleep at night.  At one point she used some wrist splints.  She does not really have radicular symptoms to speak of.    Review of Systems   Constitutional: Negative for activity change, appetite change, chills, diaphoresis, fatigue, fever and unexpected weight change.   HENT: Positive for congestion, postnasal drip, rhinorrhea and sneezing. Negative for dental problem, drooling, ear discharge, ear pain, facial swelling, hearing loss, mouth sores, nosebleeds, sinus pressure, sore throat, tinnitus, trouble swallowing and voice change.    Eyes: Positive for itching. Negative for photophobia, pain, discharge, redness and visual disturbance.   Respiratory: Negative for apnea, cough, choking, chest tightness, shortness of breath, wheezing and stridor.    Cardiovascular: Negative for chest pain, palpitations and leg swelling.   Gastrointestinal: Negative for abdominal distention, abdominal pain, anal bleeding, blood in stool, constipation, diarrhea, nausea, rectal pain and vomiting.   Endocrine: Negative for cold intolerance, heat intolerance, polydipsia, polyphagia and polyuria.   Genitourinary: Negative for decreased urine volume, difficulty urinating, dysuria, enuresis, flank pain, frequency, genital sores, hematuria and urgency.   Musculoskeletal: Positive for myalgias, neck pain and neck stiffness. Negative for arthralgias, back pain, gait problem and joint swelling.   Skin: Negative for color change, pallor, rash and wound.  "  Allergic/Immunologic: Positive for environmental allergies. Negative for food allergies and immunocompromised state.   Neurological: Negative for dizziness, tremors, seizures, syncope, facial asymmetry, speech difficulty, weakness, light-headedness, numbness and headaches.   Hematological: Negative for adenopathy. Does not bruise/bleed easily.   Psychiatric/Behavioral: Negative for agitation, behavioral problems, confusion, decreased concentration, dysphoric mood, hallucinations, self-injury, sleep disturbance and suicidal ideas. The patient is not nervous/anxious and is not hyperactive.    All other systems reviewed and are negative.      The patient's past medical history, past surgical history, family history, and social history have been reviewed at length in the electronic medical record.    Physical Exam:   Temp 98 °F (36.7 °C) (Temporal)   Ht 165.1 cm (65\")   Wt 65.3 kg (144 lb)   BMI 23.96 kg/m²   MUSCULOSKELETAL:  Neck tenderness to palpation is not observed.   ROM in neck is normal.  NEUROLOGICAL:  Strength is intact in the upper and lower extremities to direct testing.  Muscle tone is normal throughout.  Station and gait are normal.  Sensation is intact to light touch testing throughout.    Medical Decision Making    Data Review:   MRI of the cervical spine dated 2/7/2020 demonstrates some diffuse degenerative disc disease and mild loss of the normal lordosis.  Root or cord compromise is not observed.    Electrodiagnostic studies demonstrate mild right carpal tunnel syndrome, and normal left arm, and no evidence of a radiculopathy.    Diagnosis:   1.  Mechanical neck pain.  2.  Mild carpal tunnel syndrome.    Treatment Options:   Treatment for her neck difficulties should remain symptomatic.  She does have some wrist splints at home and I have asked that she take those out and use those on a sustained basis.  I do not see a role for surgical intervention at this point in time.  If her symptoms " progress then I will be happy to reevaluate her.       Diagnosis Plan   1. Carpal tunnel syndrome of right wrist     2. Mechanical neck/back pain     3. DDD (degenerative disc disease), cervical         Scribed for Pawan Vidales MD by Temitope Garcia CMA on 05/29/2020 at 3:00 PM      I, Dr. Vidales, personally performed the services described in the documentation, as scribed in my presence, and it is both accurate and complete.

## 2020-05-30 LAB
A VAGINAE DNA VAG QL NAA+PROBE: NORMAL SCORE
APPEARANCE UR: CLEAR
BACTERIA #/AREA URNS HPF: NORMAL /HPF
BACTERIA UR CULT: NO GROWTH
BACTERIA UR CULT: NORMAL
BILIRUB UR QL STRIP: NEGATIVE
BVAB2 DNA VAG QL NAA+PROBE: NORMAL SCORE
C ALBICANS DNA VAG QL NAA+PROBE: NEGATIVE
C GLABRATA DNA VAG QL NAA+PROBE: NEGATIVE
C TRACH DNA VAG QL NAA+PROBE: NEGATIVE
CASTS URNS MICRO: NORMAL
COLOR UR: YELLOW
EPI CELLS #/AREA URNS HPF: NORMAL /HPF
GLUCOSE UR QL: NEGATIVE
HGB UR QL STRIP: NEGATIVE
KETONES UR QL STRIP: NEGATIVE
LEUKOCYTE ESTERASE UR QL STRIP: NEGATIVE
MEGA1 DNA VAG QL NAA+PROBE: NORMAL SCORE
N GONORRHOEA DNA VAG QL NAA+PROBE: NEGATIVE
NITRITE UR QL STRIP: NEGATIVE
PH UR STRIP: 7 [PH] (ref 5–8)
PROT UR QL STRIP: NEGATIVE
RBC #/AREA URNS HPF: NORMAL /HPF
SP GR UR: 1.01 (ref 1–1.03)
T VAGINALIS DNA VAG QL NAA+PROBE: NEGATIVE
UROBILINOGEN UR STRIP-MCNC: NORMAL MG/DL
WBC #/AREA URNS HPF: NORMAL /HPF

## 2020-08-17 RX ORDER — LEVOTHYROXINE SODIUM 0.03 MG/1
TABLET ORAL
Qty: 90 TABLET | Refills: 0 | Status: SHIPPED | OUTPATIENT
Start: 2020-08-17 | End: 2021-01-20 | Stop reason: SDUPTHER

## 2020-11-16 ENCOUNTER — TELEPHONE (OUTPATIENT)
Dept: OBSTETRICS AND GYNECOLOGY | Facility: CLINIC | Age: 45
End: 2020-11-16

## 2020-11-19 ENCOUNTER — OFFICE VISIT (OUTPATIENT)
Dept: OBSTETRICS AND GYNECOLOGY | Facility: CLINIC | Age: 45
End: 2020-11-19

## 2020-11-19 VITALS
DIASTOLIC BLOOD PRESSURE: 68 MMHG | HEIGHT: 65 IN | WEIGHT: 145.8 LBS | BODY MASS INDEX: 24.29 KG/M2 | SYSTOLIC BLOOD PRESSURE: 120 MMHG

## 2020-11-19 DIAGNOSIS — B37.31 VAGINAL YEAST INFECTION: ICD-10-CM

## 2020-11-19 DIAGNOSIS — N90.7 VULVAR CYST: Primary | ICD-10-CM

## 2020-11-19 PROCEDURE — 99213 OFFICE O/P EST LOW 20 MIN: CPT | Performed by: PHYSICIAN ASSISTANT

## 2020-11-19 PROCEDURE — 87210 SMEAR WET MOUNT SALINE/INK: CPT | Performed by: PHYSICIAN ASSISTANT

## 2020-11-19 RX ORDER — FLUCONAZOLE 150 MG/1
TABLET ORAL
Qty: 2 TABLET | Refills: 0 | Status: SHIPPED | OUTPATIENT
Start: 2020-11-19 | End: 2020-12-03

## 2020-11-19 NOTE — PROGRESS NOTES
Subjective   Chief Complaint   Patient presents with   • Follow-up     Patient here for evaluation of vaginal lump       Lucille Linder is a 45 y.o. year old  presenting to be seen for right vulvar lump that she noticed this past Monday.  Also some vaginal itching that started several days ago. No vaginal discharge  She has Paragard IUD. LMP 10/8/2020  Lump has not gotten bigger since she first noticed it. It is minimally tender. No redness or drainage noted from it  Has not had this before     Past Medical History:   Diagnosis Date   • Allergic    • B12 deficiency    • Encounter for insertion of ParaGard IUD 2013   • Headache    • Hyperlipidemia    • Hypothyroidism    • Restless leg syndrome    • Snores    • Urinary tract infection         Current Outpatient Medications:   •  CRANBERRY JUICE EXTRACT PO, Take  by mouth Daily., Disp: , Rfl:   •  estradiol (ESTRACE VAGINAL) 0.1 MG/GM vaginal cream, Use 0.5 grams intravaginally twice weekly to control symptoms., Disp: 1 each, Rfl: 11  •  levothyroxine (SYNTHROID, LEVOTHROID) 25 MCG tablet, TAKE 1 TABLET BY MOUTH ONE TIME A DAY , Disp: 90 tablet, Rfl: 0  •  PARAGARD INTRAUTERINE COPPER IU, by Intrauterine route., Disp: , Rfl:   •  Probiotic Product (PROBIOTIC DAILY PO), Take  by mouth., Disp: , Rfl:   •  vitamin E 400 UNIT capsule, Take 400 Units by mouth Daily., Disp: , Rfl:   •  fluconazole (Diflucan) 150 MG tablet, One po now and repeat in 3 days, Disp: 2 tablet, Rfl: 0   No Known Allergies   Past Surgical History:   Procedure Laterality Date   • COLONOSCOPY N/A 2017    Procedure: COLONOSCOPY WITH ANOSCOPY;  Surgeon: Cezar Greenberg MD;  Location: [facility] ENDOSCOPY;  Service:       Social History     Socioeconomic History   • Marital status:      Spouse name: Not on file   • Number of children: Not on file   • Years of education: Not on file   • Highest education level: Not on file   Social Needs   • Financial resource  "strain: Not hard at all   • Food insecurity     Worry: Never true     Inability: Never true   • Transportation needs     Medical: No     Non-medical: No   Tobacco Use   • Smoking status: Never Smoker   • Smokeless tobacco: Never Used   Substance and Sexual Activity   • Alcohol use: Yes     Alcohol/week: 12.0 standard drinks     Types: 2 Glasses of wine, 7 Cans of beer, 3 Shots of liquor per week     Comment: OCCASSIONALLY-1 DRINK PER DAY.  USUALLY BEER   • Drug use: No   • Sexual activity: Yes     Partners: Male     Birth control/protection: I.U.D.   Lifestyle   • Physical activity     Days per week: 0 days     Minutes per session: 0 min   • Stress: To some extent      Family History   Problem Relation Age of Onset   • Lung cancer Father    • Other Other         arteriosclerotic cardiovascular disease, cerebrovascular accident   • Breast cancer Other    • Breast cancer Paternal Grandmother    • Thyroid disease Sister    • Colon cancer Neg Hx        Review of Systems   Constitutional: Negative for chills, diaphoresis and fever.   Gastrointestinal: Negative for abdominal pain, nausea and vomiting.   Genitourinary: Negative for dysuria, pelvic pain, vaginal bleeding and vaginal discharge.           Objective   /68   Ht 165.1 cm (65\")   Wt 66.1 kg (145 lb 12.8 oz)   LMP 10/08/2020 (Exact Date)   Breastfeeding No   BMI 24.26 kg/m²     Physical Exam  Constitutional:       Appearance: Normal appearance. She is well-developed, well-groomed and normal weight.   Eyes:      General: Lids are normal.      Extraocular Movements: Extraocular movements intact.      Conjunctiva/sclera: Conjunctivae normal.   Abdominal:      General: Abdomen is flat.      Palpations: Abdomen is soft.      Tenderness: There is no abdominal tenderness. There is no guarding.   Genitourinary:     Labia:         Right: Lesion present. No rash or tenderness.         Left: No rash, tenderness or lesion.       Urethra: No prolapse, urethral pain, " urethral swelling or urethral lesion.      Vagina: No vaginal discharge, erythema, tenderness, bleeding or lesions.      Cervix: No cervical motion tenderness, discharge, friability, lesion or erythema.          Comments: Small marble size cyst under skin right upper labia majora. Non tender and no erythema  thick white discharhge vaginal walls  Wet bere budding yeast  Musculoskeletal: Normal range of motion.   Skin:     General: Skin is warm and dry.      Findings: No lesion or rash.   Neurological:      Mental Status: She is alert and oriented to person, place, and time.   Psychiatric:         Attention and Perception: Attention normal.         Mood and Affect: Mood normal.         Speech: Speech normal.         Behavior: Behavior is cooperative.         Thought Content: Thought content normal.              Assessment and Plan  Diagnoses and all orders for this visit:    1. Vulvar cyst (Primary)    2. Vaginal yeast infection    Other orders  -     fluconazole (Diflucan) 150 MG tablet; One po now and repeat in 3 days  Dispense: 2 tablet; Refill: 0      Patient Instructions   Advise warm moist compresses for vulvar cyst and loose clothing.  No antibiotic indicated at this time              This note was electronically signed.    Radha Pradhan PA-C   November 19, 2020

## 2020-11-19 NOTE — PATIENT INSTRUCTIONS
Advise warm moist compresses for vulvar cyst and loose clothing.  No antibiotic indicated at this time

## 2020-11-20 RX ORDER — FLUCONAZOLE 150 MG/1
TABLET ORAL
Qty: 2 TABLET | Refills: 0 | Status: SHIPPED | OUTPATIENT
Start: 2020-11-20 | End: 2020-12-03

## 2020-11-30 ENCOUNTER — TELEPHONE (OUTPATIENT)
Dept: INTERNAL MEDICINE | Facility: CLINIC | Age: 45
End: 2020-11-30

## 2020-12-03 ENCOUNTER — OFFICE VISIT (OUTPATIENT)
Dept: OBSTETRICS AND GYNECOLOGY | Facility: CLINIC | Age: 45
End: 2020-12-03

## 2020-12-03 ENCOUNTER — TELEPHONE (OUTPATIENT)
Dept: INTERNAL MEDICINE | Facility: CLINIC | Age: 45
End: 2020-12-03

## 2020-12-03 VITALS
BODY MASS INDEX: 24.36 KG/M2 | HEIGHT: 65 IN | SYSTOLIC BLOOD PRESSURE: 118 MMHG | DIASTOLIC BLOOD PRESSURE: 70 MMHG | WEIGHT: 146.2 LBS

## 2020-12-03 DIAGNOSIS — R10.31 ACUTE RIGHT LOWER QUADRANT PAIN: Primary | ICD-10-CM

## 2020-12-03 DIAGNOSIS — Z30.431 IUD CHECK UP: ICD-10-CM

## 2020-12-03 DIAGNOSIS — N83.291 COMPLEX CYST OF RIGHT OVARY: ICD-10-CM

## 2020-12-03 PROCEDURE — 99214 OFFICE O/P EST MOD 30 MIN: CPT | Performed by: PHYSICIAN ASSISTANT

## 2020-12-03 NOTE — PATIENT INSTRUCTIONS
With small complex cyst of the right ovary this could be the cause of the patient's pelvic pain however there could be a musculoskeletal component as well given her hip pain and low back pain.  She is advised to do a trial of nonsteroidal anti-inflammatories such as ibuprofen over-the-counter and heating pad as needed.  We will follow-up in 4 weeks for repeat ultrasound to recheck small complex right ovarian cyst.  Should her symptoms worsen she is advised to call or return to the office.

## 2020-12-03 NOTE — PROGRESS NOTES
Subjective   Chief Complaint   Patient presents with   • Pelvic Pain     Patient is C/O pelvic cramping x one week       Lucille Linder is a 45 y.o. year old  presenting to be seen for complaint of right lower abdominal and pelvic pain as well as right low back pain and right hip pain.  Reports her pain started rather suddenly 1 week ago.  She was not doing anything out of the ordinary when this pain started.  It feels like a very deep achy cramp in her lower pelvis and right hip and low back.  Patient has a ParaGard IUD and her last period was 2020.  Her menses are regular however the last period was a longer bleed lasting 7 to 8 days.  She has not had any fever or chills.  She has not had any nausea or vomiting.  No urinary symptoms and no change in bowel habits.  Transvaginal pelvic ultrasound is done in the office today and notes a normal-appearing anteverted uterus with IUD seen in proper position.  The right ovary has a 2 cm slightly complex cyst.  The left ovary has a 2.3 cm simple cyst.  There is no free fluid or adnexal masses.  Ultrasound images are reviewed by myself.    Past Medical History:   Diagnosis Date   • Allergic    • B12 deficiency    • Encounter for insertion of ParaGard IUD 2013   • Headache    • Hyperlipidemia    • Hypothyroidism    • Restless leg syndrome    • Snores    • Urinary tract infection         Current Outpatient Medications:   •  levothyroxine (SYNTHROID, LEVOTHROID) 25 MCG tablet, TAKE 1 TABLET BY MOUTH ONE TIME A DAY , Disp: 90 tablet, Rfl: 0  •  PARAGARD INTRAUTERINE COPPER IU, by Intrauterine route., Disp: , Rfl:   •  vitamin E 400 UNIT capsule, Take 400 Units by mouth Daily., Disp: , Rfl:   •  CRANBERRY JUICE EXTRACT PO, Take  by mouth Daily., Disp: , Rfl:   •  estradiol (ESTRACE VAGINAL) 0.1 MG/GM vaginal cream, Use 0.5 grams intravaginally twice weekly to control symptoms., Disp: 1 each, Rfl: 11  •  Probiotic Product (PROBIOTIC DAILY  "PO), Take  by mouth., Disp: , Rfl:    No Known Allergies   Past Surgical History:   Procedure Laterality Date   • COLONOSCOPY N/A 4/4/2017    Procedure: COLONOSCOPY WITH ANOSCOPY;  Surgeon: Cezar Greenberg MD;  Location: [facility] ENDOSCOPY;  Service:       Social History     Socioeconomic History   • Marital status:      Spouse name: Not on file   • Number of children: Not on file   • Years of education: Not on file   • Highest education level: Not on file   Social Needs   • Financial resource strain: Not hard at all   • Food insecurity     Worry: Never true     Inability: Never true   • Transportation needs     Medical: No     Non-medical: No   Tobacco Use   • Smoking status: Never Smoker   • Smokeless tobacco: Never Used   Substance and Sexual Activity   • Alcohol use: Yes     Alcohol/week: 12.0 standard drinks     Types: 2 Glasses of wine, 7 Cans of beer, 3 Shots of liquor per week     Comment: OCCASSIONALLY-1 DRINK PER DAY.  USUALLY BEER   • Drug use: No   • Sexual activity: Yes     Partners: Male     Birth control/protection: I.U.D.   Lifestyle   • Physical activity     Days per week: 0 days     Minutes per session: 0 min   • Stress: To some extent      Family History   Problem Relation Age of Onset   • Lung cancer Father    • Other Other         arteriosclerotic cardiovascular disease, cerebrovascular accident   • Breast cancer Other    • Breast cancer Paternal Grandmother    • Thyroid disease Sister    • Colon cancer Neg Hx        Review of Systems   Constitutional: Negative for chills, diaphoresis and fever.   Gastrointestinal: Positive for abdominal pain. Negative for constipation, diarrhea, nausea and vomiting.   Genitourinary: Positive for pelvic pain. Negative for difficulty urinating, dysuria, menstrual problem, vaginal bleeding and vaginal discharge.   Musculoskeletal: Positive for back pain.           Objective   /70   Ht 165.1 cm (65\")   Wt 66.3 kg (146 lb 3.2 oz)   LMP 11/20/2020 (Exact " Date)   Breastfeeding No   BMI 24.33 kg/m²     Physical Exam  Constitutional:       Appearance: Normal appearance. She is well-developed, well-groomed and normal weight.   Eyes:      General: Lids are normal.      Extraocular Movements: Extraocular movements intact.      Conjunctiva/sclera: Conjunctivae normal.   Abdominal:      General: Abdomen is flat. There is no distension.      Palpations: Abdomen is soft.      Tenderness: There is abdominal tenderness in the right lower quadrant. There is no guarding or rebound.   Genitourinary:     Labia:         Right: No rash, tenderness or lesion.         Left: No rash, tenderness or lesion.       Urethra: No prolapse, urethral pain, urethral swelling or urethral lesion.      Vagina: No vaginal discharge, erythema, tenderness, bleeding or lesions.      Cervix: No cervical motion tenderness, discharge, friability, lesion, erythema or cervical bleeding.      Uterus: Not enlarged and not tender.       Adnexa:         Right: Tenderness present. No mass.          Left: No mass or tenderness.        Comments: IUD strings seen  Musculoskeletal: Normal range of motion.   Skin:     General: Skin is warm and dry.      Findings: No lesion or rash.   Neurological:      General: No focal deficit present.      Mental Status: She is alert and oriented to person, place, and time.   Psychiatric:         Attention and Perception: Attention normal.         Mood and Affect: Mood normal.         Speech: Speech normal.         Behavior: Behavior is cooperative.         Thought Content: Thought content normal.              Assessment and Plan  Diagnoses and all orders for this visit:    1. Acute right lower quadrant pain (Primary)  -     Cancel: US Non-ob Transvaginal    2. IUD check up  -     Cancel: US Non-ob Transvaginal    3. Complex cyst of right ovary      Patient Instructions   With small complex cyst of the right ovary this could be the cause of the patient's pelvic pain however there  could be a musculoskeletal component as well given her hip pain and low back pain.  She is advised to do a trial of nonsteroidal anti-inflammatories such as ibuprofen over-the-counter and heating pad as needed.  We will follow-up in 4 weeks for repeat ultrasound to recheck small complex right ovarian cyst.  Should her symptoms worsen she is advised to call or return to the office.             This note was electronically signed.    Radha Pradhan PA-C   December 3, 2020

## 2020-12-03 NOTE — TELEPHONE ENCOUNTER
PT CALLED WANTING TO KNOW IF SHE NEEDS TO SCHEDULE WITH DR REILLY OR HER GYNECOLOGIST    PT STATED SHE IS EXPERIENCING LOWER BACK PAIN AND PAIN IN HER ABDOMEN    PT STATED HE LAST PERIOD WAS LATE AND SHE IS STILL SPOTTING    SHE IS REQUESTING A CALL BACK FROM A NURSE    CALL BACK NUMBER: [phone]

## 2020-12-05 ENCOUNTER — OFFICE VISIT (OUTPATIENT)
Dept: INTERNAL MEDICINE | Facility: CLINIC | Age: 45
End: 2020-12-05

## 2020-12-05 VITALS
SYSTOLIC BLOOD PRESSURE: 114 MMHG | HEIGHT: 65 IN | BODY MASS INDEX: 24.16 KG/M2 | HEART RATE: 78 BPM | OXYGEN SATURATION: 100 % | TEMPERATURE: 97.7 F | RESPIRATION RATE: 18 BRPM | DIASTOLIC BLOOD PRESSURE: 60 MMHG | WEIGHT: 145 LBS

## 2020-12-05 DIAGNOSIS — S33.5XXA LUMBAR SPRAIN, INITIAL ENCOUNTER: Primary | ICD-10-CM

## 2020-12-05 PROCEDURE — 96372 THER/PROPH/DIAG INJ SC/IM: CPT | Performed by: INTERNAL MEDICINE

## 2020-12-05 PROCEDURE — 99213 OFFICE O/P EST LOW 20 MIN: CPT | Performed by: INTERNAL MEDICINE

## 2020-12-05 RX ORDER — METHYLPREDNISOLONE 4 MG/1
TABLET ORAL
Qty: 21 EACH | Refills: 0 | Status: SHIPPED | OUTPATIENT
Start: 2020-12-05 | End: 2021-01-14

## 2020-12-05 RX ORDER — KETOROLAC TROMETHAMINE 30 MG/ML
30 INJECTION, SOLUTION INTRAMUSCULAR; INTRAVENOUS EVERY 6 HOURS PRN
Status: DISCONTINUED | OUTPATIENT
Start: 2020-12-05 | End: 2020-12-05

## 2020-12-05 RX ORDER — KETOROLAC TROMETHAMINE 30 MG/ML
60 INJECTION, SOLUTION INTRAMUSCULAR; INTRAVENOUS ONCE
Status: COMPLETED | OUTPATIENT
Start: 2020-12-05 | End: 2020-12-05

## 2020-12-05 RX ADMIN — KETOROLAC TROMETHAMINE 60 MG: 30 INJECTION, SOLUTION INTRAMUSCULAR; INTRAVENOUS at 11:43

## 2020-12-05 NOTE — PROGRESS NOTES
Chief Complaint   Patient presents with   • Back Pain     8-9 days lower right sided pain that wraps around to front, pain with movement, saw GYN on 12/3       Subjective     History of Present Illness   Lucille Linder is a 45 y.o. female presenting for follow up on acute back pain which began about 9 days ago on the right lumbar and seems to radiate across the front into her abdomen.  She did see OB/GYN and had vaginal ultrasound which showed a right ovarian cyst but no rupture.  She notes pain and discomfort with turning and twisting as well as bending forward.  She has had chronic back pain and follows with a chiropractor unfortunately recent treatments with chiropractor have not been effective.    The following portions of the patient's history were reviewed and updated as appropriate: allergies, current medications, past family history, past medical history, past social history, past surgical history and problem list.    Review of Systems   Constitutional: Negative for chills, fatigue and fever.   HENT: Negative for congestion, ear pain, rhinorrhea, sinus pressure and sore throat.    Eyes: Negative for visual disturbance.   Respiratory: Negative for cough, chest tightness, shortness of breath and wheezing.    Cardiovascular: Negative for chest pain, palpitations and leg swelling.   Gastrointestinal: Negative for abdominal pain, blood in stool, constipation, diarrhea, nausea and vomiting.   Endocrine: Negative for polydipsia and polyuria.   Genitourinary: Negative for dysuria and hematuria.   Musculoskeletal: Positive for back pain. Negative for arthralgias.   Skin: Negative for rash.   Neurological: Negative for dizziness, light-headedness, numbness and headaches.   Psychiatric/Behavioral: Negative for dysphoric mood and sleep disturbance. The patient is not nervous/anxious.        No Known Allergies    Past Medical History:   Diagnosis Date   • Allergic    • B12 deficiency 2015   • Encounter for  insertion of ParaGard IUD 03/2013   • Headache    • Hyperlipidemia    • Hypothyroidism 2015   • Restless leg syndrome 1995   • Snores    • Urinary tract infection        Social History     Socioeconomic History   • Marital status:      Spouse name: Not on file   • Number of children: Not on file   • Years of education: Not on file   • Highest education level: Not on file   Social Needs   • Financial resource strain: Not hard at all   • Food insecurity     Worry: Never true     Inability: Never true   • Transportation needs     Medical: No     Non-medical: No   Tobacco Use   • Smoking status: Never Smoker   • Smokeless tobacco: Never Used   Substance and Sexual Activity   • Alcohol use: Yes     Alcohol/week: 12.0 standard drinks     Types: 2 Glasses of wine, 7 Cans of beer, 3 Shots of liquor per week     Comment: OCCASSIONALLY-1 DRINK PER DAY.  USUALLY BEER   • Drug use: No   • Sexual activity: Yes     Partners: Male     Birth control/protection: I.U.D.   Lifestyle   • Physical activity     Days per week: 0 days     Minutes per session: 0 min   • Stress: To some extent        Past Surgical History:   Procedure Laterality Date   • COLONOSCOPY N/A 4/4/2017    Procedure: COLONOSCOPY WITH ANOSCOPY;  Surgeon: Cezar Greenberg MD;  Location: [facility] ENDOSCOPY;  Service:        Family History   Problem Relation Age of Onset   • Lung cancer Father    • Other Other         arteriosclerotic cardiovascular disease, cerebrovascular accident   • Breast cancer Other    • Breast cancer Paternal Grandmother    • Thyroid disease Sister    • Colon cancer Neg Hx          Current Outpatient Medications:   •  CRANBERRY JUICE EXTRACT PO, Take  by mouth Daily., Disp: , Rfl:   •  estradiol (ESTRACE VAGINAL) 0.1 MG/GM vaginal cream, Use 0.5 grams intravaginally twice weekly to control symptoms., Disp: 1 each, Rfl: 11  •  levothyroxine (SYNTHROID, LEVOTHROID) 25 MCG tablet, TAKE 1 TABLET BY MOUTH ONE TIME A DAY , Disp: 90 tablet, Rfl:  "0  •  PARAGARD INTRAUTERINE COPPER IU, by Intrauterine route., Disp: , Rfl:   •  Probiotic Product (PROBIOTIC DAILY PO), Take  by mouth., Disp: , Rfl:   •  vitamin E 400 UNIT capsule, Take 400 Units by mouth Daily., Disp: , Rfl:   •  methylPREDNISolone (MEDROL) 4 MG dose pack, Take as directed on package instructions., Disp: 21 each, Rfl: 0  No current facility-administered medications for this visit.     Objective   /60   Pulse 78   Temp 97.7 °F (36.5 °C)   Resp 18   Ht 165.1 cm (65\")   Wt 65.8 kg (145 lb)   LMP 11/20/2020 (Exact Date)   SpO2 100%   BMI 24.13 kg/m²     Physical Exam  Vitals signs and nursing note reviewed.   Constitutional:       Appearance: Normal appearance. She is well-developed.   HENT:      Head: Normocephalic and atraumatic.   Eyes:      Extraocular Movements: Extraocular movements intact.      Conjunctiva/sclera: Conjunctivae normal.   Neck:      Musculoskeletal: Normal range of motion and neck supple.   Pulmonary:      Effort: Pulmonary effort is normal.   Musculoskeletal:      Comments: No significant pain with leg raise.  Pain with turning and twisting in both directions of back.  Unable to bend forward to touch toes due to pain.   Skin:     General: Skin is warm and dry.      Findings: No rash.   Neurological:      General: No focal deficit present.      Mental Status: She is alert and oriented to person, place, and time.   Psychiatric:         Mood and Affect: Mood normal.         Behavior: Behavior normal.         Assessment/Plan   Diagnoses and all orders for this visit:    1. Lumbar sprain, initial encounter (Primary)  -     Discontinue: ketorolac (TORADOL) injection 30 mg  -     methylPREDNISolone (MEDROL) 4 MG dose pack; Take as directed on package instructions.  Dispense: 21 each; Refill: 0  -     Ambulatory Referral to Physical Therapy Evaluate and treat          Discussion Summary:  Patient is a 45 y.o. female presenting for follow up with lumbar sprain.  Toradol " injection given in the office today for acute pain.  Patient may continue taking Medrol Dosepak over the next few days.  Advised on physical therapy to help with strengthening the back and relieving acute pain.        Follow up:  No follow-ups on file.

## 2021-01-14 ENCOUNTER — OFFICE VISIT (OUTPATIENT)
Dept: OBSTETRICS AND GYNECOLOGY | Facility: CLINIC | Age: 46
End: 2021-01-14

## 2021-01-14 ENCOUNTER — OFFICE VISIT (OUTPATIENT)
Dept: ORTHOPEDIC SURGERY | Facility: CLINIC | Age: 46
End: 2021-01-14

## 2021-01-14 VITALS
BODY MASS INDEX: 24.09 KG/M2 | DIASTOLIC BLOOD PRESSURE: 64 MMHG | SYSTOLIC BLOOD PRESSURE: 112 MMHG | HEIGHT: 65 IN | WEIGHT: 144.6 LBS

## 2021-01-14 VITALS — HEIGHT: 65 IN | WEIGHT: 145 LBS | BODY MASS INDEX: 24.16 KG/M2 | RESPIRATION RATE: 18 BRPM

## 2021-01-14 DIAGNOSIS — M50.30 DEGENERATIVE DISC DISEASE, CERVICAL: ICD-10-CM

## 2021-01-14 DIAGNOSIS — Z87.42 HISTORY OF OVARIAN CYST: Primary | ICD-10-CM

## 2021-01-14 DIAGNOSIS — G56.03 BILATERAL CARPAL TUNNEL SYNDROME: Primary | ICD-10-CM

## 2021-01-14 DIAGNOSIS — M77.12 LATERAL EPICONDYLITIS OF LEFT ELBOW: ICD-10-CM

## 2021-01-14 PROCEDURE — 99213 OFFICE O/P EST LOW 20 MIN: CPT | Performed by: PHYSICIAN ASSISTANT

## 2021-01-14 PROCEDURE — 99204 OFFICE O/P NEW MOD 45 MIN: CPT | Performed by: ORTHOPAEDIC SURGERY

## 2021-01-14 PROCEDURE — 20550 NJX 1 TENDON SHEATH/LIGAMENT: CPT | Performed by: ORTHOPAEDIC SURGERY

## 2021-01-14 RX ORDER — LIDOCAINE HYDROCHLORIDE 10 MG/ML
5 INJECTION, SOLUTION INFILTRATION; PERINEURAL
Status: COMPLETED | OUTPATIENT
Start: 2021-01-14 | End: 2021-01-14

## 2021-01-14 RX ADMIN — LIDOCAINE HYDROCHLORIDE 5 MG: 10 INJECTION, SOLUTION INFILTRATION; PERINEURAL at 10:12

## 2021-01-14 NOTE — PROGRESS NOTES
Subjective   Patient ID: Lucille Linder is a 45 y.o. female  Pain of the Left Elbow (Patient is here today for left elbow pain that started 6 months ago off and on. She doesn't recall any injury and states the past 2-3 months have been the worst especially with activity.)             History of Present Illness  45-year-old right-hand-dominant  who has been complaining of chronic neck pains Dr. Quinones in May was diagnosed with degenerative disc disease C5-6 nonsurgical care was advised EMG study at that time did show mild carpal tunnel syndrome on the right and normal EMG findings on the left.  She has tried night splinting to both hands with no improvement complains of both hands falling asleep regularly at night for over a year, has not noticed any loss of strength.  Left elbow lateral pain is developed over the last several months no clear traumatic onset no loss of motion.  She does recall injuring her right anterior bicipital area lifting a heavy cabinet about 6 months ago but has not had any recurrent pain in that right elbow.  Left elbow pain is burning in nature radiates down the arm at times towards the mid forearm along the radial border.  Denies triggering of the fingers loss of motion either wrist and has noticed some loss of strength in both hands with normal use.      Review of Systems   Constitutional: Negative for fever.   HENT: Negative for dental problem and voice change.    Eyes: Negative for visual disturbance.   Respiratory: Negative for shortness of breath.    Cardiovascular: Negative for chest pain.   Gastrointestinal: Negative for abdominal pain.   Genitourinary: Negative for dysuria.   Musculoskeletal: Positive for arthralgias. Negative for gait problem and joint swelling.   Skin: Negative for rash.   Neurological: Negative for speech difficulty.   Hematological: Does not bruise/bleed easily.   Psychiatric/Behavioral: Negative for confusion.       Past Medical History:    Diagnosis Date   • Allergic    • B12 deficiency 2015   • Encounter for insertion of ParaGard IUD 03/2013   • Headache    • Hyperlipidemia    • Hypothyroidism 2015   • Restless leg syndrome 1995   • Snores    • Urinary tract infection         Past Surgical History:   Procedure Laterality Date   • COLONOSCOPY N/A 4/4/2017    Procedure: COLONOSCOPY WITH ANOSCOPY;  Surgeon: Cezar Greenberg MD;  Location: [facility] ENDOSCOPY;  Service:        Family History   Problem Relation Age of Onset   • Lung cancer Father    • Other Other         arteriosclerotic cardiovascular disease, cerebrovascular accident   • Breast cancer Other    • Breast cancer Paternal Grandmother    • Thyroid disease Sister    • Colon cancer Neg Hx        Social History     Socioeconomic History   • Marital status:      Spouse name: Not on file   • Number of children: Not on file   • Years of education: Not on file   • Highest education level: Not on file   Social Needs   • Financial resource strain: Not hard at all   • Food insecurity     Worry: Never true     Inability: Never true   • Transportation needs     Medical: No     Non-medical: No   Tobacco Use   • Smoking status: Never Smoker   • Smokeless tobacco: Never Used   Substance and Sexual Activity   • Alcohol use: Yes     Alcohol/week: 12.0 standard drinks     Types: 2 Glasses of wine, 7 Cans of beer, 3 Shots of liquor per week     Comment: OCCASSIONALLY-1 DRINK PER DAY.  USUALLY BEER   • Drug use: No   • Sexual activity: Yes     Partners: Male     Birth control/protection: I.U.D.   Lifestyle   • Physical activity     Days per week: 0 days     Minutes per session: 0 min   • Stress: To some extent       I have reviewed the medical and surgical history, family history, social history, medications, and/or allergies, and the review of systems of this report.    No Known Allergies      Current Outpatient Medications:   •  CRANBERRY JUICE EXTRACT PO, Take  by mouth Daily., Disp: , Rfl:   •   "levothyroxine (SYNTHROID, LEVOTHROID) 25 MCG tablet, TAKE 1 TABLET BY MOUTH ONE TIME A DAY , Disp: 90 tablet, Rfl: 0  •  PARAGARD INTRAUTERINE COPPER IU, by Intrauterine route., Disp: , Rfl:   •  Probiotic Product (PROBIOTIC DAILY PO), Take  by mouth., Disp: , Rfl:   •  vitamin E 400 UNIT capsule, Take 400 Units by mouth Daily., Disp: , Rfl:     Objective   Resp 18   Ht 165.1 cm (65\")   Wt 65.8 kg (145 lb)   BMI 24.13 kg/m²    Physical Exam  Constitutional: Patient is oriented to person, place, and time. Patient appears well-developed and well-nourished.   HENT:Head: Normocephalic and atraumatic.   Eyes: EOM are normal. Pupils are equal, round, and reactive to light.   Neck: Normal range of motion. Neck supple.   Cardiovascular: Normal rate.    Pulmonary/Chest: Effort normal and breath sounds normal.   Abdominal: Soft.   Neurological: Patient is alert and oriented to person, place, and time.   Skin: Skin is warm and dry.   Psychiatric: Patient has a normal mood and affect.   Nursing note and vitals reviewed.       [unfilled]   Cervical spine: Limited rotation extension with pain posterior lateral on the right increased compared to the left, Spurling maneuver negative for pain or paresthesias.    Left elbow: Point tenderness over the lateral condyle area no atrophy full extension pain is elicited there with resisted wrist extension and finger extension, very slight tenderness over the olecranon but no swelling, no antecubital space swelling or tenderness forearm soft and appear symmetric to the other arm.    Left hand: Positive Tinel's Phalen's and carpal compression test over the median nerve decrease sensation long fingertip circulation appears normal no triggering full active range of motion full painless wrist range of motion and no basal joint grind findings    Right hand: Positive Tinel's Phalen's and carpal compression test over the median nerve decrease sensation long fingertip circulation appears normal " "no triggering full active range of motion full painless wrist range of motion and no basal joint grind findings    Assessment/Plan   Review of Radiographic Studies:    Radiographic images today of affected area I personally viewed and showed no sign of acute fracture or dislocation.     Direct examination of cervical MRI 2020 and lumbar radiographs 2016 confirmed degenerative cervical disc at C5-6      Injection Tendon or Ligament-LT tennis elbow inj    Date/Time: 1/14/2021 10:12 AM  Performed by: Jose A Gonzales MD  Authorized by: Jose A Gonzales MD   Consent: Verbal consent obtained.  Risks and benefits: risks, benefits and alternatives were discussed  Consent given by: patient  Patient understanding: patient states understanding of the procedure being performed  Patient consent: the patient's understanding of the procedure matches consent given  Procedure consent: procedure consent matches procedure scheduled  Relevant documents: relevant documents present and verified  Test results: test results available and properly labeled  Site marked: the operative site was marked  Imaging studies: imaging studies available  Patient identity confirmed: verbally with patient  Time out: Immediately prior to procedure a \"time out\" was called to verify the correct patient, procedure, equipment, support staff and site/side marked as required.  Preparation: Patient was prepped and draped in the usual sterile fashion.  Patient tolerance: patient tolerated the procedure well with no immediate complications  Comments: 0.5cc triamcinolone 40mg per 1mL [id number] exp-10/01/2021  Medications administered: 5 mg lidocaine 1 %           Diagnoses and all orders for this visit:    1. Bilateral carpal tunnel syndrome (Primary)    2. Lateral epicondylitis of left elbow    3. Degenerative disc disease, cervical       Orthopedic activities reviewed and patient expressed appreciation, Using illustrations and models, the " nature of the pathology was explained to the patient, Physical therapy referral given and Use brace as instructed      Recommendations/Plan:   Referral: PT/OT 2-3 x wk x 4-6 wks    Patient agreeable to call or return sooner for any concerns.       I discussed and reviewed with the patient the natural history of carpal tunnel syndrome treatment options pros cons of surgical nonsurgical care were reviewed and explained.  Given her positive physical findings failure to improve with nonsurgical treatment and prior EMG study I did recommend right hand carpal tunnel release.  She also is a candidate for left hand carpal tunnel release in the future if symptoms continue.      I discussed with the patient the diagnosis, condition, natural history and treatment alternatives, both surgical and nonsurgical.  I reviewed the surgical procedural details using models, diagrams and reviewing x-ray findings.  I explained the nature of the operation, anesthesia methods and the postoperative recovery.  I explained risks and complications including but not limited to infection, bleeding, blood clotting, poor healing, chronic pain, stiffness, failure of the procedure, possible recurrence of the condition and anesthetic related risks.  The patient had opportunity to ask questions which were all answered to their satisfaction and decided to proceed with the plan for surgery.  I believe informed consent to proceed with the surgery was given verbally in my presence today.  The surgical consent form will be signed in the presence of the nursing staff prior to the surgery.    Impression:  EMG proven mild carpal tunnel syndrome right hand with normal EMG findings left hand and significant carpal tunnel symptoms, left elbow lateral condyle-itis  Plan:  Occupational therapy referral to focus on the elbow, recheck in 6 weeks if elbow pain continues at that time order MRI.  She will call to schedule right hand carpal tunnel release at her  convenience.

## 2021-01-14 NOTE — PROGRESS NOTES
Subjective   Chief Complaint   Patient presents with   • Follow-up     TVS for right ovarian cyst       Lucille Linder is a 45 y.o. year old  presenting to be seen for follow-up for small complex right ovarian cyst.  Patient had been seen in early December with an episode of right lower quadrant pain.  She had a pelvic ultrasound done previously which showed a 2 cm slightly complex right ovarian cyst and a 2.3 cm simple left ovarian cyst.  Patient presents today for follow-up ultrasound.  She has no complaints or concerns today.  She has not had any further pelvic pain.  She has ParaGard IUD.  Pelvic ultrasound done in the office today is reviewed with the patient.  It notes normal-appearing uterus with IUD in place.  Her endometrium is 9 mm.  Bilateral ovaries have small follicles but the previous small complex cyst has resolved.  There is no free fluid.  Ultrasound images are reviewed by myself.    Past Medical History:   Diagnosis Date   • Allergic    • B12 deficiency    • Encounter for insertion of ParaGard IUD 2013   • Headache    • Hyperlipidemia    • Hypothyroidism    • Restless leg syndrome    • Snores    • Urinary tract infection         Current Outpatient Medications:   •  CRANBERRY JUICE EXTRACT PO, Take  by mouth Daily., Disp: , Rfl:   •  levothyroxine (SYNTHROID, LEVOTHROID) 25 MCG tablet, TAKE 1 TABLET BY MOUTH ONE TIME A DAY , Disp: 90 tablet, Rfl: 0  •  PARAGARD INTRAUTERINE COPPER IU, by Intrauterine route., Disp: , Rfl:   •  Probiotic Product (PROBIOTIC DAILY PO), Take  by mouth., Disp: , Rfl:   •  vitamin E 400 UNIT capsule, Take 400 Units by mouth Daily., Disp: , Rfl:   No current facility-administered medications for this visit.    No Known Allergies   Past Surgical History:   Procedure Laterality Date   • COLONOSCOPY N/A 2017    Procedure: COLONOSCOPY WITH ANOSCOPY;  Surgeon: Cezar Greenberg MD;  Location: [facility] ENDOSCOPY;  Service:       Social History  "    Socioeconomic History   • Marital status:      Spouse name: Not on file   • Number of children: Not on file   • Years of education: Not on file   • Highest education level: Not on file   Social Needs   • Financial resource strain: Not hard at all   • Food insecurity     Worry: Never true     Inability: Never true   • Transportation needs     Medical: No     Non-medical: No   Tobacco Use   • Smoking status: Never Smoker   • Smokeless tobacco: Never Used   Substance and Sexual Activity   • Alcohol use: Yes     Alcohol/week: 12.0 standard drinks     Types: 2 Glasses of wine, 7 Cans of beer, 3 Shots of liquor per week     Comment: OCCASSIONALLY-1 DRINK PER DAY.  USUALLY BEER   • Drug use: No   • Sexual activity: Yes     Partners: Male     Birth control/protection: I.U.D.   Lifestyle   • Physical activity     Days per week: 0 days     Minutes per session: 0 min   • Stress: To some extent      Family History   Problem Relation Age of Onset   • Lung cancer Father    • Other Other         arteriosclerotic cardiovascular disease, cerebrovascular accident   • Breast cancer Other    • Breast cancer Paternal Grandmother    • Thyroid disease Sister    • Colon cancer Neg Hx        Review of Systems   Constitutional: Negative for chills, diaphoresis and fever.   Gastrointestinal: Negative for abdominal pain, nausea and vomiting.   Genitourinary: Negative for difficulty urinating, dysuria, menstrual problem and pelvic pain.           Objective   /64   Ht 165.1 cm (65\")   Wt 65.6 kg (144 lb 9.6 oz)   LMP 01/05/2021 (Exact Date)   Breastfeeding No   BMI 24.06 kg/m²     Physical Exam         Assessment and Plan  Diagnoses and all orders for this visit:    1. History of ovarian cyst (Primary)  -     US Non-ob Transvaginal      Patient Instructions   Follow up prn             This note was electronically signed.    Radha Pradhan PA-C   January 14, 2021  "

## 2021-01-21 RX ORDER — LEVOTHYROXINE SODIUM 0.03 MG/1
25 TABLET ORAL DAILY
Qty: 30 TABLET | Refills: 0 | Status: SHIPPED | OUTPATIENT
Start: 2021-01-21 | End: 2021-03-05 | Stop reason: SDUPTHER

## 2021-01-27 ENCOUNTER — OFFICE VISIT (OUTPATIENT)
Dept: INTERNAL MEDICINE | Facility: CLINIC | Age: 46
End: 2021-01-27

## 2021-01-27 VITALS
HEART RATE: 73 BPM | OXYGEN SATURATION: 97 % | BODY MASS INDEX: 23.82 KG/M2 | SYSTOLIC BLOOD PRESSURE: 126 MMHG | RESPIRATION RATE: 16 BRPM | HEIGHT: 65 IN | DIASTOLIC BLOOD PRESSURE: 82 MMHG | TEMPERATURE: 97.3 F | WEIGHT: 143 LBS

## 2021-01-27 DIAGNOSIS — Z00.00 ROUTINE GENERAL MEDICAL EXAMINATION AT A HEALTH CARE FACILITY: Primary | ICD-10-CM

## 2021-01-27 PROCEDURE — 99396 PREV VISIT EST AGE 40-64: CPT | Performed by: INTERNAL MEDICINE

## 2021-01-27 NOTE — PROGRESS NOTES
Subjective   Lucille Linder is a 45 y.o. female.     Chief Complaint   Patient presents with   • Annual Exam       History of Present Illness   Patient is here for a physical exam and due for blood work, she sees gynecology, her last mammogram and Pap smear were in 2020    Review of Systems   Constitutional: Negative for appetite change, fatigue and fever.   HENT: Negative for congestion, ear discharge, ear pain, sinus pressure and sore throat.    Eyes: Negative for pain and discharge.   Respiratory: Negative for cough, chest tightness, shortness of breath and wheezing.    Cardiovascular: Negative for chest pain, palpitations and leg swelling.   Gastrointestinal: Negative for abdominal pain, blood in stool, constipation, diarrhea and nausea.   Endocrine: Negative for cold intolerance and heat intolerance.   Genitourinary: Negative for dysuria, flank pain and frequency.   Musculoskeletal: Negative for back pain and joint swelling.   Skin: Negative for color change.   Allergic/Immunologic: Negative for environmental allergies and food allergies.   Neurological: Negative for dizziness, weakness, numbness and headaches.   Hematological: Negative for adenopathy. Does not bruise/bleed easily.   Psychiatric/Behavioral: Negative for behavioral problems and dysphoric mood. The patient is not nervous/anxious.        Past Medical History:   Diagnosis Date   • Allergic    • B12 deficiency 2015   • Encounter for insertion of ParaGard IUD 03/2013   • Headache    • Hyperlipidemia    • Hypothyroidism 2015   • Restless leg syndrome 1995   • Snores    • Urinary tract infection        Past Surgical History:   Procedure Laterality Date   • COLONOSCOPY N/A 4/4/2017    Procedure: COLONOSCOPY WITH ANOSCOPY;  Surgeon: Cezar Greenberg MD;  Location: [facility] ENDOSCOPY;  Service:        Family History   Problem Relation Age of Onset   • Lung cancer Father    • Other Other         arteriosclerotic cardiovascular disease,  "cerebrovascular accident   • Breast cancer Other    • Breast cancer Paternal Grandmother    • Thyroid disease Sister    • Colon cancer Neg Hx         reports that she has never smoked. She has never used smokeless tobacco. She reports current alcohol use of about 12.0 standard drinks of alcohol per week. She reports that she does not use drugs.    No Known Allergies        Current Outpatient Medications:   •  CRANBERRY JUICE EXTRACT PO, Take  by mouth Daily., Disp: , Rfl:   •  levothyroxine (SYNTHROID, LEVOTHROID) 25 MCG tablet, Take 1 tablet by mouth Daily. 200001, Disp: 30 tablet, Rfl: 0  •  PARAGARD INTRAUTERINE COPPER IU, by Intrauterine route., Disp: , Rfl:   •  Probiotic Product (PROBIOTIC DAILY PO), Take  by mouth., Disp: , Rfl:   •  vitamin E 400 UNIT capsule, Take 400 Units by mouth Daily., Disp: , Rfl:       Objective   Blood pressure 126/82, pulse 73, temperature 97.3 °F (36.3 °C), resp. rate 16, height 165.1 cm (65\"), weight 64.9 kg (143 lb), last menstrual period 01/05/2021, SpO2 97 %, not currently breastfeeding.    Physical Exam  Vitals signs and nursing note reviewed.   Constitutional:       General: She is not in acute distress.     Appearance: Normal appearance. She is not diaphoretic.   HENT:      Head: Normocephalic and atraumatic.      Right Ear: External ear normal.      Left Ear: External ear normal.      Nose: Nose normal.   Eyes:      Extraocular Movements: Extraocular movements intact.      Conjunctiva/sclera: Conjunctivae normal.   Neck:      Musculoskeletal: Neck supple.      Trachea: Trachea normal.   Cardiovascular:      Rate and Rhythm: Normal rate and regular rhythm.      Heart sounds: Normal heart sounds.   Pulmonary:      Effort: Pulmonary effort is normal. No respiratory distress.      Breath sounds: Normal breath sounds.   Abdominal:      General: Abdomen is flat.   Musculoskeletal:      Comments: Moves all limbs   Skin:     General: Skin is warm.   Neurological:      Mental " Status: She is alert and oriented to person, place, and time.      Comments: No gross motor or sensory deficits         Patient's Body mass index is 23.8 kg/m². BMI is within normal parameters. No follow-up required..      Results for orders placed or performed during the hospital encounter of 07/29/20   COVID-19,[facility] ROUTINE, NP/OP SWAB IN TRANSPORT MEDIA OR ESWAB 72 HR TAT - Swab, Oropharynx    Specimen: Oropharynx; Swab   Result Value Ref Range    SARS-CoV-2, SUNI Not Detected Not Detected         Assessment/Plan   Diagnoses and all orders for this visit:    1. Routine general medical examination at a health care facility (Primary)  -     CBC & Differential  -     Comprehensive Metabolic Panel  -     Lipid Panel  -     Hemoglobin A1c  -     TSH  -     Vitamin B12  -     Vitamin D 25 Hydroxy      plan:  1.physical: Physical exam conducted today,  Will obtain fasting lab work,   Impression: healthy adult Patient. Currently, patient eats a healthy diet. Screening lab work includes glucose, lipid profile , complete blood count and comprehensive panel . The risks and benefits of immunizations were discussed and immunizations are up to date. Advice and education were given regarding nutrition and aerobic exercise. Patient discussion: discussed with the patient.  Physical with preventive exam as well as age and risk appropriate counseling completed.   Patient Discussion: plan discussed with the patient, follow-up visit needed in one year. Medication Review and medication list updated  Advised Monthly self breast assessment and annual breast imaging and Calcium, 600 mg/ Vit. D, 400 IU daily; regular weight-bearing exercise             Paty Reinoso MD

## 2021-02-15 ENCOUNTER — TELEPHONE (OUTPATIENT)
Dept: INTERNAL MEDICINE | Facility: CLINIC | Age: 46
End: 2021-02-15

## 2021-02-15 NOTE — TELEPHONE ENCOUNTER
PT CALLED STATING SHE WAS RECENTLY SEEN FOR SPRAINING HER BACK    PT STATED SHE HAS NOW SPRAINED HER BACK AGAIN AND IS REQUESTING THAT SHE BE REFERRED TO A SPECIALIST    PT STATED SHE DOES NOT FEEL UP TO GOING TO WORK    PLEASE ADVISE AT: [phone]

## 2021-02-18 ENCOUNTER — TELEMEDICINE (OUTPATIENT)
Dept: INTERNAL MEDICINE | Facility: CLINIC | Age: 46
End: 2021-02-18

## 2021-02-18 DIAGNOSIS — M54.50 LUMBOSACRAL PAIN: Primary | ICD-10-CM

## 2021-02-18 DIAGNOSIS — M62.830 MUSCLE SPASM OF BACK: ICD-10-CM

## 2021-02-18 PROCEDURE — 99214 OFFICE O/P EST MOD 30 MIN: CPT | Performed by: INTERNAL MEDICINE

## 2021-02-18 RX ORDER — CYCLOBENZAPRINE HCL 10 MG
10 TABLET ORAL NIGHTLY PRN
Qty: 30 TABLET | Refills: 3 | Status: SHIPPED | OUTPATIENT
Start: 2021-02-18 | End: 2022-02-28 | Stop reason: SDUPTHER

## 2021-02-18 NOTE — PROGRESS NOTES
Subjective   Lucille Linder is a 45 y.o. female.     You have chosen to receive care through a video visit. Do you consent to use a video  visit for your medical care today? Yes  Parties active with video visit :  Paty Fay [facility]   Patient : Lucille Linder    Chief Complaint   Patient presents with   • Back Pain   • Spasms       History of Present Illness   Patient complains of walking her dog and she felt a spasm in the lower right side and she couldn't bend and put her socks on , she took 2 advils and used heat , she  Had to use her upper body to help herself up , she felt better next day , pain is in the waist area and below  And it is sharp and shooting  Pain in the area when she moves  Otherwise it is a dull pain  , patient states this happened a week ago, she had similar symptoms in December 2020 , she is doing PT for her neck  and carpal tunnel  , video visit with patient today    During today's visit, I reviewed the documented allergies, medications, chief complaint, and pertinent vitals.  I have confirmed with the patient that there have been no changes since this information was discussed with my clinical team member.    The following portions of the patient's history were reviewed and updated as appropriate: allergies, current medications, past family history, past medical history, past social history, past surgical history and problem list.    Review of Systems   Constitutional: Negative for appetite change, fatigue and fever.   HENT: Negative for congestion, ear discharge, ear pain, sinus pressure and sore throat.    Eyes: Negative for pain and discharge.   Respiratory: Negative for cough, chest tightness, shortness of breath and wheezing.    Cardiovascular: Negative for chest pain, palpitations and leg swelling.   Gastrointestinal: Negative for abdominal pain, blood in stool, constipation, diarrhea and nausea.   Endocrine: Negative for cold intolerance and heat  intolerance.   Genitourinary: Negative for dysuria, flank pain and frequency.   Musculoskeletal: Positive for back pain. Negative for joint swelling.        Muscle spasm   Skin: Negative for color change.   Allergic/Immunologic: Negative for environmental allergies and food allergies.   Neurological: Negative for dizziness, weakness, numbness and headaches.   Hematological: Negative for adenopathy. Does not bruise/bleed easily.   Psychiatric/Behavioral: Negative for behavioral problems and dysphoric mood. The patient is not nervous/anxious.          Current Outpatient Medications:   •  CRANBERRY JUICE EXTRACT PO, Take  by mouth Daily., Disp: , Rfl:   •  levothyroxine (SYNTHROID, LEVOTHROID) 25 MCG tablet, Take 1 tablet by mouth Daily. 200001, Disp: 30 tablet, Rfl: 0  •  PARAGARD INTRAUTERINE COPPER IU, by Intrauterine route., Disp: , Rfl:   •  Probiotic Product (PROBIOTIC DAILY PO), Take  by mouth., Disp: , Rfl:   •  vitamin E 400 UNIT capsule, Take 400 Units by mouth Daily., Disp: , Rfl:   •  cyclobenzaprine (FLEXERIL) 10 MG tablet, Take 1 tablet by mouth At Night As Needed for Muscle Spasms., Disp: 30 tablet, Rfl: 3    Objective      afebrile  Vitals stable   Physical Exam   All vitals recorded within this visit are reported by the patient.  General appearance: Normocephalic and nontraumatic  HEENT: External inspection of eyes, ears and nose appears benign, hearing appears intact  Neck: Neck appears supple, trachea in midline, thyroid appears not enlarged  Respiratory: Respiratory effort appears normal  Musculoskeletal: Moving all limbs  Range of motion of visible joints appears within normal  CNS: No gross motor or sensory deficits  No gross cranial nerve deficits  No tremors  Psychiatry: Alert and oriented x3  Memory appears intact  Mood and affect appears normal  Insight appears normal        Results for orders placed or performed during the hospital encounter of 07/29/20   COVID-19,[facility] ROUTINE, NP/OP SWAB  IN TRANSPORT MEDIA OR ESWAB 72 HR TAT - Swab, Oropharynx    Specimen: Oropharynx; Swab   Result Value Ref Range    SARS-CoV-2, SUNI Not Detected Not Detected         Assessment/Plan   Diagnoses and all orders for this visit:    1. Lumbosacral pain (Primary)  -     XR Spine Lumbar 2 or 3 View  -     MRI Lumbar Spine Without Contrast  -     Ambulatory Referral to Neurosurgery    2. Muscle spasm of back  -     cyclobenzaprine (FLEXERIL) 10 MG tablet; Take 1 tablet by mouth At Night As Needed for Muscle Spasms.  Dispense: 30 tablet; Refill: 3    Plan:  1.  Lumbosacral pain: We will obtain x-rays, MRI and refer patient to neurosurgery, she will benefit from physical therapy  2.  Muscle spasm: As needed trial with Flexeril  I spent 30 minutes caring for Lucille on this date of service. This time includes time spent by me in the following activities:preparing for the visit, reviewing tests, performing a medically appropriate examination and/or evaluation , counseling and educating the patient/family/caregiver, ordering medications, tests, or procedures and documenting information in the medical record  This was a  video enabled telemedicine encounter.           Paty Reinoso MD

## 2021-02-19 ENCOUNTER — HOSPITAL ENCOUNTER (OUTPATIENT)
Dept: GENERAL RADIOLOGY | Facility: HOSPITAL | Age: 46
Discharge: HOME | End: 2021-02-19
Admitting: INTERNAL MEDICINE

## 2021-02-19 PROCEDURE — 72100 X-RAY EXAM L-S SPINE 2/3 VWS: CPT

## 2021-02-24 RX ORDER — LEVOTHYROXINE SODIUM 0.03 MG/1
25 TABLET ORAL DAILY
Qty: 30 TABLET | Refills: 0 | Status: CANCELLED | OUTPATIENT
Start: 2021-02-24

## 2021-02-24 NOTE — TELEPHONE ENCOUNTER
PT CALLED STATING SHE THOUGHT A 90 DAY SUPPLY WAS CALLED INTO [facility]    PT STATED A 30 DAY WAS CALLED IN AND SHE IS COMPLETELY OUT OF MEDICATION:  levothyroxine (SYNTHROID, LEVOTHROID) 25 MCG tablet    [id number] - [place], [place] - [address] - [phone]  - [phone] FX

## 2021-02-26 ENCOUNTER — HOSPITAL ENCOUNTER (OUTPATIENT)
Dept: MRI IMAGING | Facility: HOSPITAL | Age: 46
Discharge: HOME | End: 2021-02-26
Admitting: INTERNAL MEDICINE

## 2021-02-26 PROCEDURE — 72148 MRI LUMBAR SPINE W/O DYE: CPT

## 2021-03-01 RX ORDER — LEVOTHYROXINE SODIUM 0.03 MG/1
25 TABLET ORAL DAILY
Qty: 30 TABLET | Refills: 0 | Status: CANCELLED | OUTPATIENT
Start: 2021-03-01

## 2021-03-01 NOTE — TELEPHONE ENCOUNTER
Pt notified os fasting lab work via [employer] 2/24/2021 and via phone call today. Pt verbalized understanding

## 2021-03-04 ENCOUNTER — OFFICE VISIT (OUTPATIENT)
Dept: ORTHOPEDIC SURGERY | Facility: CLINIC | Age: 46
End: 2021-03-04

## 2021-03-04 ENCOUNTER — TELEPHONE (OUTPATIENT)
Dept: INTERNAL MEDICINE | Facility: CLINIC | Age: 46
End: 2021-03-04

## 2021-03-04 VITALS
DIASTOLIC BLOOD PRESSURE: 76 MMHG | WEIGHT: 143 LBS | HEIGHT: 65 IN | BODY MASS INDEX: 23.82 KG/M2 | TEMPERATURE: 97.7 F | RESPIRATION RATE: 16 BRPM | SYSTOLIC BLOOD PRESSURE: 124 MMHG

## 2021-03-04 DIAGNOSIS — M25.511 ARTHRALGIA OF RIGHT SHOULDER REGION: Primary | ICD-10-CM

## 2021-03-04 DIAGNOSIS — M75.41 IMPINGEMENT SYNDROME OF RIGHT SHOULDER: ICD-10-CM

## 2021-03-04 DIAGNOSIS — M77.12 LATERAL EPICONDYLITIS OF LEFT ELBOW: ICD-10-CM

## 2021-03-04 DIAGNOSIS — G56.01 CARPAL TUNNEL SYNDROME OF RIGHT WRIST: ICD-10-CM

## 2021-03-04 PROCEDURE — 99213 OFFICE O/P EST LOW 20 MIN: CPT | Performed by: ORTHOPAEDIC SURGERY

## 2021-03-04 NOTE — TELEPHONE ENCOUNTER
Called patient and relayed information from unread [employer] message.     Ms. Linder,   You need to have lab work for your thyroid that is why you got only a 30 supply with no refills. You may have labs done at your convenience , have a great day.      Pt stated she was getting the labs done in the morning.

## 2021-03-04 NOTE — PROGRESS NOTES
Subjective   Patient ID: Lucille Linder is a 45 y.o. female  Pain and Follow-up of the Left Elbow (6 week follow up on left elbow pain, no longer having pain in elbow. ) and Pain of the Right Shoulder (Has noticed pain in right shoulder about a week ago after PT to correct posture.  )             History of Present Illness  Right-hand-dominant female complains of pain at the anterior aspect of the right shoulder with certain movements in the upper arm, denies acute neck pain or paresthesias in the upper arm but has continued paresthesias in the hand consistent with her carpal tunnel syndrome.  Her left elbow pain is resolved after physical therapy and the prior in visit where she received an injection, she continues to wear the splint on the wrist at night with minimal improvement EMG studies in the past did confirm carpal tunnel syndrome.  She gives no history of acute shoulder injury dislocation or prior treatment he was throwing from the outfield played softball when she was high school and then progressed to dance activities without any recall of any specific instability episodes.      Review of Systems   Constitutional: Negative for diaphoresis, fever and unexpected weight change.   HENT: Negative for dental problem and sore throat.    Eyes: Negative for visual disturbance.   Respiratory: Negative for shortness of breath.    Cardiovascular: Negative for chest pain.   Gastrointestinal: Negative for abdominal pain, constipation, diarrhea, nausea and vomiting.   Genitourinary: Negative for difficulty urinating and frequency.   Musculoskeletal: Positive for arthralgias (right shoulder).   Neurological: Negative for headaches.   Hematological: Does not bruise/bleed easily.       Past Medical History:   Diagnosis Date   • Allergic    • B12 deficiency 2015   • Encounter for insertion of ParaGard IUD 03/2013   • Headache    • Hyperlipidemia    • Hypothyroidism 2015   • Restless leg syndrome 1995   • Alicia     • Urinary tract infection         Past Surgical History:   Procedure Laterality Date   • COLONOSCOPY N/A 4/4/2017    Procedure: COLONOSCOPY WITH ANOSCOPY;  Surgeon: Cezar Greenberg MD;  Location: [facility] ENDOSCOPY;  Service:        Family History   Problem Relation Age of Onset   • Lung cancer Father    • Other Other         arteriosclerotic cardiovascular disease, cerebrovascular accident   • Breast cancer Other    • Breast cancer Paternal Grandmother    • Thyroid disease Sister    • Colon cancer Neg Hx        Social History     Socioeconomic History   • Marital status:      Spouse name: Not on file   • Number of children: Not on file   • Years of education: Not on file   • Highest education level: Not on file   Occupational History   • Occupation: Teacher     Employer: [employer]   Social Needs   • Financial resource strain: Not hard at all   • Food insecurity     Worry: Never true     Inability: Never true   • Transportation needs     Medical: No     Non-medical: No   Tobacco Use   • Smoking status: Never Smoker   • Smokeless tobacco: Never Used   Substance and Sexual Activity   • Alcohol use: Yes     Alcohol/week: 12.0 standard drinks     Types: 2 Glasses of wine, 7 Cans of beer, 3 Shots of liquor per week     Comment: OCCASSIONALLY-1 DRINK PER DAY.  USUALLY BEER   • Drug use: No   • Sexual activity: Yes     Partners: Male     Birth control/protection: I.U.D.   Lifestyle   • Physical activity     Days per week: 0 days     Minutes per session: 0 min   • Stress: To some extent   Social History Narrative    Right hand dominant       I have reviewed the medical and surgical history, family history, social history, medications, and/or allergies, and the review of systems of this report.    No Known Allergies      Current Outpatient Medications:   •  CRANBERRY JUICE EXTRACT PO, Take  by mouth Daily., Disp: , Rfl:   •  cyclobenzaprine (FLEXERIL) 10 MG tablet, Take 1 tablet by mouth At  "Night As Needed for Muscle Spasms., Disp: 30 tablet, Rfl: 3  •  levothyroxine (SYNTHROID, LEVOTHROID) 25 MCG tablet, Take 1 tablet by mouth Daily. 200001, Disp: 30 tablet, Rfl: 0  •  PARAGARD INTRAUTERINE COPPER IU, by Intrauterine route., Disp: , Rfl:   •  Probiotic Product (PROBIOTIC DAILY PO), Take  by mouth., Disp: , Rfl:   •  vitamin E 400 UNIT capsule, Take 400 Units by mouth Daily., Disp: , Rfl:     Objective   /76 (BP Location: Left arm, Patient Position: Sitting, Cuff Size: Adult)   Temp 97.7 °F (36.5 °C)   Resp 16   Ht 165.1 cm (65\")   Wt 64.9 kg (143 lb)   BMI 23.80 kg/m²    Physical Exam  Constitutional: Patient is oriented to person, place, and time. Patient appears well-developed and well-nourished.   HENT:Head: Normocephalic and atraumatic.   Eyes: EOM are normal. Pupils are equal, round, and reactive to light.   Neck: Normal range of motion. Neck supple.   Cardiovascular: Normal rate.    Pulmonary/Chest: Effort normal and breath sounds normal.   Abdominal: Soft.   Neurological: Patient is alert and oriented to person, place, and time.   Skin: Skin is warm and dry.   Psychiatric: Patient has a normal mood and affect.   Nursing note and vitals reviewed.       [unfilled]   Right shoulder: Some tenderness at the anterolateral acromial area no sign of anterior apprehension negative inferior sulcus sign no atrophy full active range of motion strength preserved throughout no tenderness AC joint or proximal biceps tendon.    Right hand with residual minimal Tinel's finding over the median nerve at the carpal tunnel consistent with carpal tunnel syndrome.    Left elbow: Less tenderness at the lateral epicondyle full painless elbow range of motion no pain with resisted wrist extension at the left wrist    Assessment/Plan   Review of Radiographic Studies:    Radiographic images today of affected area I personally viewed and showed no sign of acute fracture or dislocation.      Procedures   "   Diagnoses and all orders for this visit:    1. Arthralgia of right shoulder region (Primary)  -     XR Shoulder 2+ View Right; Future    2. Impingement syndrome of right shoulder    3. Carpal tunnel syndrome of right wrist    4. Lateral epicondylitis of left elbow       Physical therapy referral given      Recommendations/Plan:   Work/Activity Status: May perform usual activities as tolerated    Patient agreeable to call or return sooner for any concerns.             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

## 2021-03-05 ENCOUNTER — LAB (OUTPATIENT)
Dept: LAB | Facility: HOSPITAL | Age: 46
End: 2021-03-05

## 2021-03-05 LAB
25(OH)D3 SERPL-MCNC: 23.1 NG/ML (ref 30–100)
ALBUMIN SERPL-MCNC: 4.3 G/DL (ref 3.5–5.2)
ALBUMIN/GLOB SERPL: 1.7 G/DL
ALP SERPL-CCNC: 52 U/L (ref 39–117)
ALT SERPL W P-5'-P-CCNC: 9 U/L (ref 1–33)
ANION GAP SERPL CALCULATED.3IONS-SCNC: 8.4 MMOL/L (ref 5–15)
AST SERPL-CCNC: 14 U/L (ref 1–32)
BASOPHILS # BLD AUTO: 0.04 10*3/MM3 (ref 0–0.2)
BASOPHILS NFR BLD AUTO: 0.7 % (ref 0–1.5)
BILIRUB SERPL-MCNC: 0.9 MG/DL (ref 0–1.2)
BUN SERPL-MCNC: 15 MG/DL (ref 6–20)
BUN/CREAT SERPL: 16.9 (ref 7–25)
CALCIUM SPEC-SCNC: 9 MG/DL (ref 8.6–10.5)
CHLORIDE SERPL-SCNC: 104 MMOL/L (ref 98–107)
CHOLEST SERPL-MCNC: 173 MG/DL (ref 0–200)
CO2 SERPL-SCNC: 26.6 MMOL/L (ref 22–29)
CREAT SERPL-MCNC: 0.89 MG/DL (ref 0.57–1)
DEPRECATED RDW RBC AUTO: 39.9 FL (ref 37–54)
EOSINOPHIL # BLD AUTO: 0.18 10*3/MM3 (ref 0–0.4)
EOSINOPHIL NFR BLD AUTO: 3.2 % (ref 0.3–6.2)
ERYTHROCYTE [DISTWIDTH] IN BLOOD BY AUTOMATED COUNT: 11.9 % (ref 12.3–15.4)
GFR SERPL CREATININE-BSD FRML MDRD: 69 ML/MIN/1.73
GLOBULIN UR ELPH-MCNC: 2.6 GM/DL
GLUCOSE SERPL-MCNC: 85 MG/DL (ref 65–99)
HBA1C MFR BLD: 5.22 % (ref 4.8–5.6)
HCT VFR BLD AUTO: 41.7 % (ref 34–46.6)
HDLC SERPL-MCNC: 74 MG/DL (ref 40–60)
HGB BLD-MCNC: 14.4 G/DL (ref 12–15.9)
IMM GRANULOCYTES # BLD AUTO: 0.02 10*3/MM3 (ref 0–0.05)
IMM GRANULOCYTES NFR BLD AUTO: 0.4 % (ref 0–0.5)
LDLC SERPL CALC-MCNC: 88 MG/DL (ref 0–100)
LDLC/HDLC SERPL: 1.18 {RATIO}
LYMPHOCYTES # BLD AUTO: 1.58 10*3/MM3 (ref 0.7–3.1)
LYMPHOCYTES NFR BLD AUTO: 27.8 % (ref 19.6–45.3)
MCH RBC QN AUTO: 31.9 PG (ref 26.6–33)
MCHC RBC AUTO-ENTMCNC: 34.5 G/DL (ref 31.5–35.7)
MCV RBC AUTO: 92.5 FL (ref 79–97)
MONOCYTES # BLD AUTO: 0.44 10*3/MM3 (ref 0.1–0.9)
MONOCYTES NFR BLD AUTO: 7.7 % (ref 5–12)
NEUTROPHILS NFR BLD AUTO: 3.43 10*3/MM3 (ref 1.7–7)
NEUTROPHILS NFR BLD AUTO: 60.2 % (ref 42.7–76)
NRBC BLD AUTO-RTO: 0 /100 WBC (ref 0–0.2)
PLATELET # BLD AUTO: 295 10*3/MM3 (ref 140–450)
PMV BLD AUTO: 9.8 FL (ref 6–12)
POTASSIUM SERPL-SCNC: 4.6 MMOL/L (ref 3.5–5.2)
PROT SERPL-MCNC: 6.9 G/DL (ref 6–8.5)
RBC # BLD AUTO: 4.51 10*6/MM3 (ref 3.77–5.28)
SODIUM SERPL-SCNC: 139 MMOL/L (ref 136–145)
TRIGL SERPL-MCNC: 57 MG/DL (ref 0–150)
TSH SERPL DL<=0.05 MIU/L-ACNC: 2.32 UIU/ML (ref 0.27–4.2)
VIT B12 BLD-MCNC: 414 PG/ML (ref 211–946)
VLDLC SERPL-MCNC: 11 MG/DL (ref 5–40)
WBC # BLD AUTO: 5.69 10*3/MM3 (ref 3.4–10.8)

## 2021-03-05 PROCEDURE — 82607 VITAMIN B-12: CPT | Performed by: INTERNAL MEDICINE

## 2021-03-05 PROCEDURE — 36415 COLL VENOUS BLD VENIPUNCTURE: CPT | Performed by: INTERNAL MEDICINE

## 2021-03-05 PROCEDURE — 84443 ASSAY THYROID STIM HORMONE: CPT | Performed by: INTERNAL MEDICINE

## 2021-03-05 PROCEDURE — 83036 HEMOGLOBIN GLYCOSYLATED A1C: CPT | Performed by: INTERNAL MEDICINE

## 2021-03-05 PROCEDURE — 80053 COMPREHEN METABOLIC PANEL: CPT | Performed by: INTERNAL MEDICINE

## 2021-03-05 PROCEDURE — 85025 COMPLETE CBC W/AUTO DIFF WBC: CPT | Performed by: INTERNAL MEDICINE

## 2021-03-05 PROCEDURE — 80061 LIPID PANEL: CPT | Performed by: INTERNAL MEDICINE

## 2021-03-05 PROCEDURE — 82306 VITAMIN D 25 HYDROXY: CPT | Performed by: INTERNAL MEDICINE

## 2021-03-05 RX ORDER — ERGOCALCIFEROL 1.25 MG/1
50000 CAPSULE ORAL WEEKLY
Qty: 8 CAPSULE | Refills: 0 | Status: SHIPPED | OUTPATIENT
Start: 2021-03-05 | End: 2021-06-15

## 2021-03-05 RX ORDER — LEVOTHYROXINE SODIUM 0.03 MG/1
25 TABLET ORAL DAILY
Qty: 90 TABLET | Refills: 1 | Status: SHIPPED | OUTPATIENT
Start: 2021-03-05 | End: 2021-06-15 | Stop reason: SDUPTHER

## 2021-03-09 ENCOUNTER — OFFICE VISIT (OUTPATIENT)
Dept: OBSTETRICS AND GYNECOLOGY | Facility: CLINIC | Age: 46
End: 2021-03-09

## 2021-03-09 VITALS
BODY MASS INDEX: 24.16 KG/M2 | WEIGHT: 145 LBS | DIASTOLIC BLOOD PRESSURE: 62 MMHG | SYSTOLIC BLOOD PRESSURE: 118 MMHG | HEIGHT: 65 IN

## 2021-03-09 DIAGNOSIS — Z01.419 ENCOUNTER FOR GYNECOLOGICAL EXAMINATION (GENERAL) (ROUTINE) WITHOUT ABNORMAL FINDINGS: Primary | ICD-10-CM

## 2021-03-09 DIAGNOSIS — Z12.31 ENCOUNTER FOR SCREENING MAMMOGRAM FOR BREAST CANCER: ICD-10-CM

## 2021-03-09 PROCEDURE — 99396 PREV VISIT EST AGE 40-64: CPT | Performed by: OBSTETRICS & GYNECOLOGY

## 2021-03-09 NOTE — PROGRESS NOTES
"Chief Complaint  Gynecologic Exam     History of Present Illness:  Patient is 45 y.o.  who presents to [facility] for her annual examination.  Patient had her last Pap smear in January of last year.  Patient currently is using ParaGard for contraception.  This was placed in 2013.  Patient is uncertain regarding contraception in the future.  She reports her spouse is considering vasectomy.  Patient reports her menstrual cycles continue regular although heavy.  Patient denies any pelvic pain or discomfort.  Patient had previously been seen and noted to have an ovarian cyst which resolved with repeat ultrasound.  Patient sees Dr. JULES Rojas for her primary care.  Patient will be due for mammogram in May.    Physical Examination:  Vital Signs: /62   Ht 165.1 cm (65\")   Wt 65.8 kg (145 lb)   BMI 24.13 kg/m²     General Appearance: alert, appears stated age, and cooperative  Breasts: Examined in supine position  Symmetric without masses or skin dimpling  Nipples normal without inversion, lesions or discharge  There are no palpable axillary nodes  Abdomen: no masses, no hepatomegaly, no splenomegaly, soft non-tender, no guarding and no rebound tenderness  Pelvic: Clinical staff was present for exam  External genitalia:  normal appearance of the external genitalia including Bartholin's and [place]'s glands.  :  urethral meatus normal;  Vaginal:  normal pink mucosa without prolapse or lesions.  Cervix:  normal appearance. IUD string present - 3 cms in length;  Uterus:  normal size, shape and consistency.  Adnexa:  normal bimanual exam of the adnexa.  Pap smear done and specimen sent using Thin-Prep technique    Data Review:  The following data was reviewed by: Paty Licea MD on 2021:     Labs:    Imaging:  Mammo Screening Digital Tomosynthesis Bilateral With CAD (2020 14:21)  Medical Records:  None    Assessment and Plan   Problem List Items Addressed This Visit     " None      Visit Diagnoses     Encounter for gynecological examination (general) (routine) without abnormal findings    -  Primary  Pap was done today.  If she does not receive the results of the Pap within 2 weeks  time, she was instructed to call to find out the results.  I explained to Lucille that the recommendations for Pap smear interval in a low risk patient has lengthened to 3 years time if cytology alone normal or  5 years time if both cytology and HPV testing were normal.  I encouraged her to be seen yearly for a full physical exam including breast and pelvic exam even during the off years when PAP's will not be performed.    Relevant Orders    Liquid-based Pap Smear, Screening    Encounter for screening mammogram for breast cancer      It is recommended per ACOG, for women at average risk to start annual mammogram screening at the age of 40 until the age of 75 and an individualized decision be made for women after age 75.  She was encouraged to continue getting yearly mammograms.  She should report any palpable breast lump(s) or skin changes regardless of mammographic findings.  I explained to Lucille that notification regarding her mammogram results will come from the center performing the study.  Our office will not be routinely calling with mammogram results.  It is her responsibility to make sure that the results from the mammogram are communicated to her by the breast center.  If she has any questions about the results, she is welcome to call our office anytime.  The patient reports she will schedule her mammogram.    Lucille was counseled regarding having clinical breast exams and breast self-awareness.  Women aged 29-39 years of age should have clinical breast exams every 1-3 years and yearly aged 40 and older.  The patient was counseled regarding breast self-awareness focusing on having a sense of what is normal for her breasts so that she can tell if there are changes.  Even small changes should be  reported to provider.    Relevant Orders    Mammo Screening Digital Tomosynthesis Bilateral With CAD          Follow Up/Instructions:    Patient was given instructions and counseling regarding her condition or for health maintenance advice. Please see specific information pulled into the AVS if appropriate.     Note: Speech recognition transcription software may have been used to dictate portions of this document.  An attempt at proofreading has been made though minor errors in transcription may still be present.    This note was electronically signed.  Paty Licea M.D.

## 2021-03-15 ENCOUNTER — OFFICE VISIT (OUTPATIENT)
Dept: INTERNAL MEDICINE | Facility: CLINIC | Age: 46
End: 2021-03-15

## 2021-03-15 VITALS
WEIGHT: 144 LBS | OXYGEN SATURATION: 98 % | TEMPERATURE: 97 F | SYSTOLIC BLOOD PRESSURE: 122 MMHG | BODY MASS INDEX: 23.99 KG/M2 | HEIGHT: 65 IN | DIASTOLIC BLOOD PRESSURE: 70 MMHG | HEART RATE: 61 BPM

## 2021-03-15 DIAGNOSIS — R39.9 UTI SYMPTOMS: Primary | ICD-10-CM

## 2021-03-15 LAB
BILIRUB BLD-MCNC: NEGATIVE MG/DL
CLARITY, POC: CLEAR
COLOR UR: YELLOW
GLUCOSE UR STRIP-MCNC: NEGATIVE MG/DL
KETONES UR QL: NEGATIVE
LEUKOCYTE EST, POC: NEGATIVE
NITRITE UR-MCNC: NEGATIVE MG/ML
PH UR: 6 [PH] (ref 5–8)
PROT UR STRIP-MCNC: NEGATIVE MG/DL
RBC # UR STRIP: NEGATIVE /UL
SP GR UR: 1.01 (ref 1–1.03)
UROBILINOGEN UR QL: NORMAL

## 2021-03-15 PROCEDURE — 81003 URINALYSIS AUTO W/O SCOPE: CPT | Performed by: INTERNAL MEDICINE

## 2021-03-15 PROCEDURE — 99213 OFFICE O/P EST LOW 20 MIN: CPT | Performed by: INTERNAL MEDICINE

## 2021-03-15 NOTE — PROGRESS NOTES
"Chief Complaint   Patient presents with   • Abstract     Pt states she's had pain with urination and some lower abdominal pain.      Subjective   Lucille Linder is a 45 y.o. female.     Patient is here today with complaints of painful urination and lower abdominal pain.  She does not have burning with each urination.  Has been doing cranberry tabs, she takes these all the time.  She has not been using AZO.   No vaginal discharge.  She just had her annual exam a week ago and was having sxs then.  She did not mention this then.  Doing a vaginal exam did not make her sxs worse.    No pain with wiping.    She is not constipated and has no loose stools.     Urinary Tract Infection   This is a new problem. The current episode started in the past 7 days. The problem occurs intermittently. The problem has been waxing and waning. The quality of the pain is described as burning. The pain is moderate. There has been no fever. Pertinent negatives include no chills, flank pain, frequency, hematuria, hesitancy, nausea, urgency or vomiting. The treatment provided mild relief.        The following portions of the patient's history were reviewed and updated as appropriate: allergies, current medications, past family history, past medical history, past social history, past surgical history and problem list.    Review of Systems   Constitutional: Negative for chills and fever.   Gastrointestinal: Negative for nausea and vomiting.   Genitourinary: Positive for dysuria. Negative for difficulty urinating, flank pain, frequency, hematuria, hesitancy, urgency, vaginal bleeding, vaginal discharge and vaginal pain.       Objective   /70   Pulse 61   Temp 97 °F (36.1 °C)   Ht 165.1 cm (65\")   Wt 65.3 kg (144 lb)   LMP 02/27/2021 (Approximate)   SpO2 98%   BMI 23.96 kg/m²   Body mass index is 23.96 kg/m².  Physical Exam  Vitals and nursing note reviewed.   Constitutional:       General: She is not in acute distress.     " Appearance: Normal appearance. She is not ill-appearing.      Comments: Kind and pleasant female in no distress today   HENT:      Head: Normocephalic and atraumatic.      Right Ear: External ear normal.      Left Ear: External ear normal.   Abdominal:      General: Abdomen is flat. Bowel sounds are normal. There is no distension.      Palpations: Abdomen is soft. There is no mass.      Tenderness: There is abdominal tenderness. There is no right CVA tenderness, left CVA tenderness, guarding or rebound.      Comments: Mild discomfort in mid lower abdomen   Neurological:      General: No focal deficit present.      Mental Status: She is alert and oriented to person, place, and time. Mental status is at baseline.      Cranial Nerves: No cranial nerve deficit.      Motor: No weakness.      Gait: Gait normal.   Psychiatric:         Mood and Affect: Mood normal.         Behavior: Behavior normal.         Thought Content: Thought content normal.         Judgment: Judgment normal.         Assessment/Plan   Lucille Linder is here today and the following problems have been addressed:      Diagnoses and all orders for this visit:    1. UTI symptoms (Primary)  -     POC Urinalysis Dipstick, Automated    Urinalysis clean today with no sign of infection  Patient has been taking cranberry tablets daily in addition to probiotics  Encouraged her to increase water intake as she states she does not drink enough water throughout the day  Patient states her symptoms are very mild and she simply wanted to make sure she did not have UTI today  I encouraged her to return to clinic for UA and culture only if her symptoms worsened over the next week or 2  No pelvic exam done today as this was done last week while she was symptomatic, she states she has not heard results from her Pap yet    Return to clinic as needed if symptoms worsen or persist    Please note that portions of this note were completed with a voice recognition  program.  Efforts were made to edit dictation, but occasionally words are mistranscribed.

## 2021-03-17 DIAGNOSIS — Z01.419 ENCOUNTER FOR GYNECOLOGICAL EXAMINATION (GENERAL) (ROUTINE) WITHOUT ABNORMAL FINDINGS: ICD-10-CM

## 2021-03-29 ENCOUNTER — TELEPHONE (OUTPATIENT)
Dept: INTERNAL MEDICINE | Facility: CLINIC | Age: 46
End: 2021-03-29

## 2021-03-29 DIAGNOSIS — R50.9 FEVER, UNSPECIFIED FEVER CAUSE: Primary | ICD-10-CM

## 2021-03-29 RX ORDER — AZITHROMYCIN 250 MG/1
TABLET, FILM COATED ORAL
Qty: 6 TABLET | Refills: 0 | Status: SHIPPED | OUTPATIENT
Start: 2021-03-29 | End: 2021-03-31

## 2021-03-29 NOTE — TELEPHONE ENCOUNTER
PATIENT STATES THAT SHE HAD A TEMPERATURE  ALL DAY YESTERDAY AND IT HAS GONE DOWN NOW. SHE HAS SOME CONGESTION AND HER ALLERGIES HAVE BEEN ACTING UP FOR THE LAST TWO WEEKS. SHE HAS HAD THE COVID  VACCINE BUT SHE WOULD LIKE TO KNOW IF SHE SHOULD BE TESTED.    [phone]

## 2021-03-29 NOTE — TELEPHONE ENCOUNTER
covid test in chart - do at hospital 8-4.30 pm at the front entrance    sent meds- zpak  Monitor vitals

## 2021-03-31 ENCOUNTER — OFFICE VISIT (OUTPATIENT)
Dept: NEUROSURGERY | Facility: CLINIC | Age: 46
End: 2021-03-31

## 2021-03-31 VITALS
DIASTOLIC BLOOD PRESSURE: 80 MMHG | BODY MASS INDEX: 23.63 KG/M2 | HEIGHT: 65 IN | SYSTOLIC BLOOD PRESSURE: 108 MMHG | TEMPERATURE: 97.1 F | WEIGHT: 141.8 LBS

## 2021-03-31 DIAGNOSIS — M50.30 DDD (DEGENERATIVE DISC DISEASE), CERVICAL: ICD-10-CM

## 2021-03-31 DIAGNOSIS — M51.36 DISC DEGENERATION, LUMBAR: ICD-10-CM

## 2021-03-31 DIAGNOSIS — M54.9 MECHANICAL BACK PAIN: Primary | ICD-10-CM

## 2021-03-31 DIAGNOSIS — G56.01 CARPAL TUNNEL SYNDROME OF RIGHT WRIST: ICD-10-CM

## 2021-03-31 PROCEDURE — 99213 OFFICE O/P EST LOW 20 MIN: CPT | Performed by: PHYSICIAN ASSISTANT

## 2021-03-31 NOTE — PROGRESS NOTES
Patient: Lucille Linder  : 1975  Chart #: [id number]    Date of Service: 2021    CHIEF COMPLAINT: Low back pain and muscle spasm    History of Present Illness Ms. Linder is a very pleasant 45-year-old  who was previously seen with Dr. Vidales for neck pain and some carpal tunnel syndrome.  Today she complains of low back pain.  She has periodic flareups from time to time over the past several years.  Her last flareup was in December and lasted about a week.  Symptoms were alleviated with a Medrol Dosepak.  Generally she can calm things down with anti-inflammatories or physical therapy.  Today she is doing well.  She has a little bit of tightness in the right low back.  She denies ever experiencing any kind of radicular type symptoms or neurogenic claudication.  She would like further guidance on maintaining a healthy back and avoiding flareups of pain.      Past Medical History:   Diagnosis Date   • Allergic    • B12 deficiency    • Encounter for insertion of ParaGard IUD 2013   • Headache    • Hyperlipidemia    • Hypothyroidism    • Restless leg syndrome    • Snores    • Urinary tract infection          Current Outpatient Medications:   •  CRANBERRY JUICE EXTRACT PO, Take  by mouth Daily., Disp: , Rfl:   •  cyclobenzaprine (FLEXERIL) 10 MG tablet, Take 1 tablet by mouth At Night As Needed for Muscle Spasms., Disp: 30 tablet, Rfl: 3  •  levothyroxine (SYNTHROID, LEVOTHROID) 25 MCG tablet, Take 1 tablet by mouth Daily. , Disp: 90 tablet, Rfl: 1  •  PARAGARD INTRAUTERINE COPPER IU, by Intrauterine route., Disp: , Rfl:   •  Probiotic Product (PROBIOTIC DAILY PO), Take  by mouth., Disp: , Rfl:   •  vitamin D (ERGOCALCIFEROL) 1.25 MG (97654 UT) capsule capsule, Take 1 capsule by mouth 1 (One) Time Per Week., Disp: 8 capsule, Rfl: 0  •  vitamin E 400 UNIT capsule, Take 400 Units by mouth Daily., Disp: , Rfl:     Past Surgical History:   Procedure Laterality Date   •  "COLONOSCOPY N/A 4/4/2017    Procedure: COLONOSCOPY WITH ANOSCOPY;  Surgeon: Cezar Greenberg MD;  Location: [facility] ENDOSCOPY;  Service:        Social History     Socioeconomic History   • Marital status:      Spouse name: Not on file   • Number of children: Not on file   • Years of education: Not on file   • Highest education level: Not on file   Tobacco Use   • Smoking status: Never Smoker   • Smokeless tobacco: Never Used   Vaping Use   • Vaping Use: Never used   Substance and Sexual Activity   • Alcohol use: Yes     Alcohol/week: 12.0 standard drinks     Types: 2 Glasses of wine, 7 Cans of beer, 3 Shots of liquor per week     Comment: OCCASSIONALLY-1 DRINK PER DAY.  USUALLY BEER   • Drug use: No   • Sexual activity: Yes     Partners: Male     Birth control/protection: I.U.D.         Review of Systems   Constitutional: Negative.    HENT: Negative.    Eyes: Negative.    Respiratory: Negative.    Cardiovascular: Negative.    Gastrointestinal: Negative.    Genitourinary: Negative.    Musculoskeletal: Positive for back pain, neck pain and neck stiffness.   Skin: Negative.    Allergic/Immunologic: Negative.    Neurological: Negative.    Hematological: Negative.    Psychiatric/Behavioral: Negative.        Objective   Vital Signs: Blood pressure 108/80, temperature 97.1 °F (36.2 °C), height 165.1 cm (65\"), weight 64.3 kg (141 lb 12.8 oz), not currently breastfeeding.  Physical Exam  Vitals and nursing note reviewed.   Constitutional:       General: She is not in acute distress.     Appearance: She is well-developed.   HENT:      Head: Normocephalic and atraumatic.   Eyes:      Pupils: Pupils are equal, round, and reactive to light.   Cardiovascular:      Heart sounds: Normal heart sounds.   Pulmonary:      Breath sounds: Normal breath sounds.   Psychiatric:         Behavior: Behavior normal.         Thought Content: Thought content normal.     Musculoskeletal:     Strength is intact in upper and lower extremities to " direct testing.     Station and gait are normal.     Straight leg raising is negative.   Neurologic:     Muscle tone is normal throughout.     Coordination is intact.     Deep tendon reflexes: 1+ and symmetrical.     Sensation is intact to light touch throughout.     Patient is oriented to person, place, and time.         Independent review of radiographic imaging: MRI of the lumbar spine demonstrates modest degenerative disc disease at L3-4 and L4-5.  Normal vertebral alignment.  There is bilateral facet effusions at L4-5.  No significant neural foraminal or central stenosis.    Assessment/Plan   Diagnosis: Mild lumbar degenerative disc disease with periodic flareups of pain    Medical Decision Making: Patient is doing well today.  I went over her MRI with her in the room today and discussed the nature of degenerative disc disease as well as treatment for her periodic flareups of pain.  I gave her a note for formal physical therapy.  She may go for a few sessions to learn home exercise routine.  We also discussed daily walking and yoga.  She is a non-smoker and maintains a healthy weight which will benefit her in the long run.  I am happy to see her back if symptoms become more of an issue.       Diagnoses and all orders for this visit:    1. Mechanical neck/back pain (Primary)    2. Disc degeneration, lumbar  -     Ambulatory Referral to Physical Therapy Evaluate and treat    3. DDD (degenerative disc disease), cervical    4. Carpal tunnel syndrome of right wrist                        Patient's Body mass index is 23.6 kg/m². BMI is within normal parameters. No follow-up required..         Nataliia Christianson PA-C  Patient Care Team:  Paty Reinoso MD as PCP - General (Internal Medicine)

## 2021-04-22 ENCOUNTER — OFFICE VISIT (OUTPATIENT)
Dept: ORTHOPEDIC SURGERY | Facility: CLINIC | Age: 46
End: 2021-04-22

## 2021-04-22 VITALS — WEIGHT: 141 LBS | BODY MASS INDEX: 23.49 KG/M2 | TEMPERATURE: 96.9 F | HEIGHT: 65 IN

## 2021-04-22 DIAGNOSIS — M75.41 IMPINGEMENT SYNDROME OF RIGHT SHOULDER: Primary | ICD-10-CM

## 2021-04-22 PROCEDURE — 20610 DRAIN/INJ JOINT/BURSA W/O US: CPT | Performed by: ORTHOPAEDIC SURGERY

## 2021-04-22 RX ORDER — LIDOCAINE HYDROCHLORIDE 10 MG/ML
2 INJECTION, SOLUTION INFILTRATION; PERINEURAL
Status: COMPLETED | OUTPATIENT
Start: 2021-04-22 | End: 2021-04-22

## 2021-04-22 RX ORDER — TRIAMCINOLONE ACETONIDE 40 MG/ML
40 INJECTION, SUSPENSION INTRA-ARTICULAR; INTRAMUSCULAR
Status: COMPLETED | OUTPATIENT
Start: 2021-04-22 | End: 2021-04-22

## 2021-04-22 RX ADMIN — LIDOCAINE HYDROCHLORIDE 2 ML: 10 INJECTION, SOLUTION INFILTRATION; PERINEURAL at 15:19

## 2021-04-22 RX ADMIN — TRIAMCINOLONE ACETONIDE 40 MG: 40 INJECTION, SUSPENSION INTRA-ARTICULAR; INTRAMUSCULAR at 15:19

## 2021-04-22 NOTE — PROGRESS NOTES
"Subjective   Lucille Linder is a 45 y.o. female here today for injection therapy.    She has continued pain to right anterolateral shoulder area slightly improved with physical therapy and medications no new injury slight loss of range of motion as well continue.    Chief Complaint   Patient presents with   • Right Shoulder - Follow-up     Here for cortisone injection of shoulder.       Past Medical History:   Diagnosis Date   • Allergic    • B12 deficiency 2015   • Encounter for insertion of ParaGard IUD 03/2013   • Headache    • Hyperlipidemia    • Hypothyroidism 2015   • Restless leg syndrome 1995   • Snores    • Urinary tract infection         Past Surgical History:   Procedure Laterality Date   • COLONOSCOPY N/A 4/4/2017    Procedure: COLONOSCOPY WITH ANOSCOPY;  Surgeon: Cezar Greenberg MD;  Location: [facility] ENDOSCOPY;  Service:        No Known Allergies    Objective   Temp 96.9 °F (36.1 °C)   Ht 165.1 cm (65\")   Wt 64 kg (141 lb)   BMI 23.46 kg/m²    Physical Exam    Skin exam stable with no erythema, ecchymosis or rash.  No new swelling.  No motor or sensory changes.  Distal pulse intact.     Right shoulder with positive impingement findings positive tenderness anterolateral acromial area no tenderness at the AC joint proximal biceps tendon.  No atrophy in the supraspinatus no scapulothoracic winging and Spurling maneuver negative for arm pain or paresthesias.    Large Joint Arthrocentesis: R subacromial bursa  Date/Time: 4/22/2021 3:19 PM  Consent given by: patient  Site marked: site marked  Timeout: Immediately prior to procedure a time out was called to verify the correct patient, procedure, equipment, support staff and site/side marked as required   Supporting Documentation  Indications: pain   Procedure Details  Location: shoulder - R subacromial bursa  Needle size: 22 G  Medications administered: 2 mL lidocaine 1 %; 40 mg triamcinolone acetonide 40 MG/ML            Assessment/Plan      " Diagnosis Plan   1. Impingement syndrome of right shoulder         No complications of injection noted.    Discussion of orthopaedic goals and activities and patient and/or guardian expressed appreciation. Call or notify for any adverse effect from injection therapy. Ice, heat, and/or modalities as beneficial. Watch for signs and symptoms of infection.    Recommendations:  Work/Activity Status: May perform usual activities as tolerated. May return to routine exercise and physical work as tolerated. No strenuous activity.  Patient and/or guardian is encouraged to call or return for any issues or concerns.    No follow-ups on file.  Patient agreeable to call or return sooner for any concerns.    Continue right shoulder impingement syndrome she will be sent for an MRI return to see me after the MRI is complete

## 2021-05-10 ENCOUNTER — HOSPITAL ENCOUNTER (OUTPATIENT)
Dept: MAMMOGRAPHY | Facility: HOSPITAL | Age: 46
Discharge: HOME | End: 2021-05-10
Admitting: OBSTETRICS & GYNECOLOGY

## 2021-05-10 DIAGNOSIS — Z12.31 ENCOUNTER FOR SCREENING MAMMOGRAM FOR BREAST CANCER: ICD-10-CM

## 2021-05-10 PROCEDURE — 77067 SCR MAMMO BI INCL CAD: CPT

## 2021-05-10 PROCEDURE — 77063 BREAST TOMOSYNTHESIS BI: CPT

## 2021-05-20 ENCOUNTER — HOSPITAL ENCOUNTER (OUTPATIENT)
Dept: MRI IMAGING | Facility: HOSPITAL | Age: 46
Discharge: HOME | End: 2021-05-20
Admitting: ORTHOPAEDIC SURGERY

## 2021-05-20 PROCEDURE — 73221 MRI JOINT UPR EXTREM W/O DYE: CPT

## 2021-06-15 ENCOUNTER — OFFICE VISIT (OUTPATIENT)
Dept: INTERNAL MEDICINE | Facility: CLINIC | Age: 46
End: 2021-06-15

## 2021-06-15 VITALS
TEMPERATURE: 97.7 F | WEIGHT: 140 LBS | HEIGHT: 65 IN | SYSTOLIC BLOOD PRESSURE: 106 MMHG | RESPIRATION RATE: 16 BRPM | OXYGEN SATURATION: 98 % | BODY MASS INDEX: 23.32 KG/M2 | DIASTOLIC BLOOD PRESSURE: 70 MMHG | HEART RATE: 72 BPM

## 2021-06-15 DIAGNOSIS — E03.8 ADULT ONSET HYPOTHYROIDISM: Primary | ICD-10-CM

## 2021-06-15 DIAGNOSIS — M25.511 CHRONIC RIGHT SHOULDER PAIN: ICD-10-CM

## 2021-06-15 DIAGNOSIS — G89.29 CHRONIC RIGHT SHOULDER PAIN: ICD-10-CM

## 2021-06-15 PROCEDURE — 99213 OFFICE O/P EST LOW 20 MIN: CPT | Performed by: INTERNAL MEDICINE

## 2021-06-15 RX ORDER — LEVOTHYROXINE SODIUM 0.03 MG/1
25 TABLET ORAL DAILY
Qty: 90 TABLET | Refills: 1 | Status: SHIPPED | OUTPATIENT
Start: 2021-06-15 | End: 2021-07-01 | Stop reason: SDUPTHER

## 2021-06-15 NOTE — PROGRESS NOTES
"Chief Complaint  Follow-up and Hypothyroidism    Subjective          Lucille Linder presents to [facility]  History of Present Illness  Patient is here to follow-up on thyroid and had lab work done and needs refills on medication, patient is also here to follow-up on chronic right shoulder pain and was seen by orthopedist and received a shot in her shoulder which has helped her symptoms, she she had MRI done the results of which have been reviewed with her today.She states she is in the process of moving as they just sold their house and bought a new one in town  Objective   Vital Signs:   /70   Pulse 72   Temp 97.7 °F (36.5 °C)   Resp 16   Ht 165.1 cm (65\")   Wt 63.5 kg (140 lb)   SpO2 98%   BMI 23.30 kg/m²     Physical Exam  Vitals and nursing note reviewed.   Constitutional:       General: She is not in acute distress.     Appearance: Normal appearance. She is not diaphoretic.   HENT:      Head: Normocephalic and atraumatic.      Right Ear: External ear normal.      Left Ear: External ear normal.      Nose: Nose normal.   Eyes:      Extraocular Movements: Extraocular movements intact.      Conjunctiva/sclera: Conjunctivae normal.   Neck:      Trachea: Trachea normal.   Cardiovascular:      Rate and Rhythm: Normal rate and regular rhythm.      Heart sounds: Normal heart sounds.   Pulmonary:      Effort: Pulmonary effort is normal. No respiratory distress.      Breath sounds: Normal breath sounds.   Abdominal:      General: Abdomen is flat.   Musculoskeletal:      Cervical back: Neck supple.      Comments: Moves all limbs   Skin:     General: Skin is warm.   Neurological:      Mental Status: She is alert and oriented to person, place, and time.      Comments: No gross motor or sensory deficits        Result Review :     Common labs    Common Labsle 3/5/21 3/5/21 3/5/21 3/5/21    0823 0823 0823 0823   Glucose  85     BUN  15     Creatinine  0.89     eGFR Non " African Am  69     Sodium  139     Potassium  4.6     Chloride  104     Calcium  9.0     Albumin  4.30     Total Bilirubin  0.9     Alkaline Phosphatase  52     AST (SGOT)  14     ALT (SGPT)  9     WBC 5.69      Hemoglobin 14.4      Hematocrit 41.7      Platelets 295      Total Cholesterol   173    Triglycerides   57    HDL Cholesterol   74 (A)    LDL Cholesterol    88    Hemoglobin A1C    5.22   (A) Abnormal value                      Assessment and Plan    Diagnoses and all orders for this visit:    1. Adult onset hypothyroidism (Primary)  -     TSH  -     levothyroxine (SYNTHROID, LEVOTHROID) 25 MCG tablet; Take 1 tablet by mouth Daily. 200001  Dispense: 90 tablet; Refill: 1    2. Chronic right shoulder pain    Plan:  1. hypothyroidism:  reviewed  tsh , and continue levothyroxine  2.  Chronic right shoulder pain: To continue per orthopedist, MRI reviewed , symptoms have improved a lot after her recent steroid shot  I spent 20 minutes caring for Lucille on this date of service. This time includes time spent by me in the following activities:preparing for the visit, reviewing tests, performing a medically appropriate examination and/or evaluation , counseling and educating the patient/family/caregiver, ordering medications, tests, or procedures and documenting information in the medical record  Follow Up   Return in about 8 months (around 1/31/2022).  Patient was given instructions and counseling regarding her condition or for health maintenance advice. Please see specific information pulled into the AVS if appropriate.

## 2021-06-15 NOTE — PATIENT INSTRUCTIONS
MyPlate from USDA    MyPlate is an outline of a general healthy diet based on the 2010 Dietary Guidelines for Americans, from the U.S. Department of Agriculture (USDA). It sets guidelines for how much food you should eat from each food group based on your age, sex, and level of physical activity.  What are tips for following MyPlate?  To follow MyPlate recommendations:  · Eat a wide variety of fruits and vegetables, grains, and protein foods.  · Serve smaller portions and eat less food throughout the day.  · Limit portion sizes to avoid overeating.  · Enjoy your food.  · Get at least 150 minutes of exercise every week. This is about 30 minutes each day, 5 or more days per week.  It can be difficult to have every meal look like MyPlate. Think about MyPlate as eating guidelines for an entire day, rather than each individual meal.  Fruits and vegetables  · Make half of your plate fruits and vegetables.  · Eat many different colors of fruits and vegetables each day.  · For a 2,000 calorie daily food plan, eat:  ? 2½ cups of vegetables every day.  ? 2 cups of fruit every day.  · 1 cup is equal to:  ? 1 cup raw or cooked vegetables.  ? 1 cup raw fruit.  ? 1 medium-sized orange, apple, or banana.  ? 1 cup 100% fruit or vegetable juice.  ? 2 cups raw leafy greens, such as lettuce, spinach, or kale.  ? ½ cup dried fruit.  Grains  · One fourth of your plate should be grains.  · Make at least half of the grains you eat each day whole grains.  · For a 2,000 calorie daily food plan, eat 6 oz of grains every day.  · 1 oz is equal to:  ? 1 slice bread.  ? 1 cup cereal.  ? ½ cup cooked rice, cereal, or pasta.  Protein  · One fourth of your plate should be protein.  · Eat a wide variety of protein foods, including meat, poultry, fish, eggs, beans, nuts, and tofu.  · For a 2,000 calorie daily food plan, eat 5½ oz of protein every day.  · 1 oz is equal to:  ? 1 oz meat, poultry, or fish.  ? ¼ cup cooked beans.  ? 1 egg.  ? ½ oz nuts  or seeds.  ? 1 Tbsp peanut butter.  Dairy  · Drink fat-free or low-fat (1%) milk.  · Eat or drink dairy as a side to meals.  · For a 2,000 calorie daily food plan, eat or drink 3 cups of dairy every day.  · 1 cup is equal to:  ? 1 cup milk, yogurt, cottage cheese, or soy milk (soy beverage).  ? 2 oz processed cheese.  ? 1½ oz natural cheese.  Fats, oils, salt, and sugars  · Only small amounts of oils are recommended.  · Avoid foods that are high in calories and low in nutritional value (empty calories), like foods high in fat or added sugars.  · Choose foods that are low in salt (sodium). Choose foods that have less than 140 milligrams (mg) of sodium per serving.  · Drink water instead of sugary drinks. Drink enough water each day to keep your urine pale yellow.  Where to find support  · Work with your health care provider or a nutrition specialist (dietitian) to develop a customized eating plan that is right for you.  · Download an paul (mobile application) to help you track your daily food intake.  Where to find more information  · Go to ChooseMyPlate.gov for more information.  Summary  · MyPlate is a general guideline for healthy eating from the USDA. It is based on the 2010 Dietary Guidelines for Americans.  · In general, fruits and vegetables should take up ½ of your plate, grains should take up ¼ of your plate, and protein should take up ¼ of your plate.  This information is not intended to replace advice given to you by your health care provider. Make sure you discuss any questions you have with your health care provider.  Document Revised: 05/21/2020 Document Reviewed: 03/19/2018  Elsevier Patient Education © 2021 Else[employer] Inc.

## 2021-07-01 DIAGNOSIS — E03.8 ADULT ONSET HYPOTHYROIDISM: ICD-10-CM

## 2021-07-01 LAB — TSH SERPL DL<=0.005 MIU/L-ACNC: 1.96 UIU/ML (ref 0.27–4.2)

## 2021-07-01 RX ORDER — LEVOTHYROXINE SODIUM 0.03 MG/1
25 TABLET ORAL DAILY
Qty: 90 TABLET | Refills: 1 | Status: SHIPPED | OUTPATIENT
Start: 2021-07-01 | End: 2022-02-28 | Stop reason: SDUPTHER

## 2021-09-03 ENCOUNTER — TRANSCRIBE ORDERS (OUTPATIENT)
Dept: LAB | Facility: HOSPITAL | Age: 46
End: 2021-09-03

## 2021-09-03 ENCOUNTER — LAB (OUTPATIENT)
Dept: LAB | Facility: HOSPITAL | Age: 46
End: 2021-09-03

## 2021-09-03 DIAGNOSIS — Z20.822 COVID-19 RULED OUT: Primary | ICD-10-CM

## 2021-09-03 DIAGNOSIS — Z20.822 COVID-19 RULED OUT: ICD-10-CM

## 2021-09-03 LAB — SARS-COV-2 RNA NOSE QL NAA+PROBE: NOT DETECTED

## 2021-09-03 PROCEDURE — C9803 HOPD COVID-19 SPEC COLLECT: HCPCS

## 2021-09-03 PROCEDURE — U0004 COV-19 TEST NON-CDC HGH THRU: HCPCS

## 2021-09-08 ENCOUNTER — TELEPHONE (OUTPATIENT)
Dept: ORTHOPEDIC SURGERY | Facility: CLINIC | Age: 46
End: 2021-09-08

## 2021-09-08 NOTE — TELEPHONE ENCOUNTER
Caller: CARLO SOLARES    Relationship to patient: SELF    Best call back number: [phone]    Chief complaint: RIGHT HAND CARPAL TUNNEL    Type of visit: PROCEDURE    Requested date: UNKNOWN    Additional notes: PATIENT WANTED A CARPAL TUNNEL RELEASE PROCEDURE THAT SHE SPOKE WITH DR COE ABOUT. UNSURE IF THIS IS CONSIDERED ELECTIVE. PLEASE CALL TO SCHEDULE IF ABLE TO DO SO.

## 2021-09-08 NOTE — TELEPHONE ENCOUNTER
Spoke with patient, advised carpal tunnel is elective and those procedures are on hold for now due Covid. Patient understands and will look forward to call from us when surgery restrictions are lifted.

## 2021-09-21 ENCOUNTER — TELEPHONE (OUTPATIENT)
Dept: ORTHOPEDIC SURGERY | Facility: CLINIC | Age: 46
End: 2021-09-21

## 2021-09-21 NOTE — TELEPHONE ENCOUNTER
Provider: SARAVANAN   Caller: CARLO SOLARES  Phone Number: [phone]  Reason for Call:  SCHEDULING A INJECTION LEFT ELBOW

## 2021-09-28 ENCOUNTER — OFFICE VISIT (OUTPATIENT)
Dept: ORTHOPEDIC SURGERY | Facility: CLINIC | Age: 46
End: 2021-09-28

## 2021-09-28 VITALS — HEIGHT: 65 IN | WEIGHT: 140 LBS | TEMPERATURE: 98.1 F | BODY MASS INDEX: 23.32 KG/M2

## 2021-09-28 DIAGNOSIS — G56.01 CARPAL TUNNEL SYNDROME OF RIGHT WRIST: ICD-10-CM

## 2021-09-28 DIAGNOSIS — M77.12 LATERAL EPICONDYLITIS OF LEFT ELBOW: Primary | ICD-10-CM

## 2021-09-28 PROCEDURE — 20550 NJX 1 TENDON SHEATH/LIGAMENT: CPT | Performed by: ORTHOPAEDIC SURGERY

## 2021-09-28 RX ORDER — LIDOCAINE HYDROCHLORIDE 10 MG/ML
5 INJECTION, SOLUTION INFILTRATION; PERINEURAL
Status: COMPLETED | OUTPATIENT
Start: 2021-09-28 | End: 2021-09-28

## 2021-09-28 RX ADMIN — LIDOCAINE HYDROCHLORIDE 5 MG: 10 INJECTION, SOLUTION INFILTRATION; PERINEURAL at 08:40

## 2021-09-28 NOTE — PROGRESS NOTES
"Subjective   Lucille Linder is a 45 y.o. female here today for injection therapy.  She is had recurrent pain in the left elbow after a lot of moving activities packing and unpacking no acute trauma or loss of motion or paresthesias in her left elbow.  She has successful relief of her left elbow pain in the past after injection.  She still has paresthesias in the right hand with carpal tunnel syndrome would like to schedule the surgery as soon as Covid restrictions on elective surgery are lifted.    Chief Complaint   Patient presents with   • Left Elbow - Injections       Past Medical History:   Diagnosis Date   • Allergic    • B12 deficiency 2015   • Encounter for insertion of ParaGard IUD 03/2013   • Headache    • Hyperlipidemia    • Hypothyroidism 2015   • Restless leg syndrome 1995   • Snores    • Urinary tract infection         Past Surgical History:   Procedure Laterality Date   • COLONOSCOPY N/A 4/4/2017    Procedure: COLONOSCOPY WITH ANOSCOPY;  Surgeon: Cezar Greenberg MD;  Location: [facility] ENDOSCOPY;  Service:        No Known Allergies    Objective   Temp 98.1 °F (36.7 °C)   Ht 165.1 cm (65\")   Wt 63.5 kg (140 lb)   BMI 23.30 kg/m²    Physical Exam    Skin exam stable with no erythema, ecchymosis or rash.  No new swelling.  No motor or sensory changes.  Distal pulse intact.    Injection Tendon or Ligament-Left elbow    Date/Time: 9/28/2021 8:40 AM  Performed by: Joes A Gonzales MD  Authorized by: Jose A Gonzales MD   Consent: Verbal consent obtained.  Risks and benefits: risks, benefits and alternatives were discussed  Consent given by: patient  Patient understanding: patient states understanding of the procedure being performed  Patient consent: the patient's understanding of the procedure matches consent given  Procedure consent: procedure consent matches procedure scheduled  Relevant documents: relevant documents present and verified  Site marked: the operative site was marked  Imaging " "studies: imaging studies available  Patient identity confirmed: verbally with patient  Time out: Immediately prior to procedure a \"time out\" was called to verify the correct patient, procedure, equipment, support staff and site/side marked as required.  Preparation: Patient was prepped and draped in the usual sterile fashion.  Local anesthesia used: yes    Anesthesia:  Local anesthesia used: yes  Local Anesthetic: lidocaine 1% without epinephrine  Anesthetic total: 0.5 mL    Sedation:  Patient sedated: no    Patient tolerance: patient tolerated the procedure well with no immediate complications  Comments: 0.5. mL Triamcinolone-40    NDC  [address]-6  Lot # [id number]  Exp 04/01/2023  Medications administered: 5 mg lidocaine 1 %          Assessment/Plan      Diagnosis Plan   1. Lateral epicondylitis of left elbow     2. Carpal tunnel syndrome of right wrist         No complications of injection noted.    Discussion of orthopaedic goals and activities and patient and/or guardian expressed appreciation. Call or notify for any adverse effect from injection therapy. Ice, heat, and/or modalities as beneficial. Watch for signs and symptoms of infection.    Recommendations:  Work/Activity Status: May perform usual activities as tolerated. May return to routine exercise and physical work as tolerated. No strenuous activity.  Patient and/or guardian is encouraged to call or return for any issues or concerns.    Return in about 3 months (around 12/28/2021) for Recheck.  Patient agreeable to call or return sooner for any concerns.    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

## 2021-10-21 ENCOUNTER — OFFICE VISIT (OUTPATIENT)
Dept: OBSTETRICS AND GYNECOLOGY | Facility: CLINIC | Age: 46
End: 2021-10-21

## 2021-10-21 VITALS
DIASTOLIC BLOOD PRESSURE: 62 MMHG | WEIGHT: 135 LBS | BODY MASS INDEX: 22.49 KG/M2 | HEIGHT: 65 IN | SYSTOLIC BLOOD PRESSURE: 112 MMHG

## 2021-10-21 DIAGNOSIS — E03.9 HYPOTHYROIDISM, UNSPECIFIED TYPE: ICD-10-CM

## 2021-10-21 DIAGNOSIS — N94.6 DYSMENORRHEA: ICD-10-CM

## 2021-10-21 DIAGNOSIS — R53.83 FATIGUE, UNSPECIFIED TYPE: ICD-10-CM

## 2021-10-21 DIAGNOSIS — N93.9 ABNORMAL UTERINE BLEEDING (AUB): Primary | ICD-10-CM

## 2021-10-21 DIAGNOSIS — N89.8 VAGINAL DISCHARGE: ICD-10-CM

## 2021-10-21 PROCEDURE — 99214 OFFICE O/P EST MOD 30 MIN: CPT | Performed by: OBSTETRICS & GYNECOLOGY

## 2021-10-21 RX ORDER — METRONIDAZOLE 500 MG/1
500 TABLET ORAL 2 TIMES DAILY
Qty: 14 TABLET | Refills: 0 | Status: SHIPPED | OUTPATIENT
Start: 2021-10-21 | End: 2021-10-28

## 2021-10-21 NOTE — PROGRESS NOTES
Chief Complaint  Vaginal Bleeding (Patient complains of irregular bleeding with IUD, also complains of vaginal discharge with odor after intercourse. )     History of Present Illness:  Patient is 45 y.o.  who presents to [facility] for evaluation of heavy irregular menstrual cycles as well as vaginal discharge.  Patient currently has ParaGard IUD.  This was inserted in .  Patient reports over the last several months having irregular menstrual cycles.  Patient reports she has had menstrual cycles 2 weeks apart.  She reports they have been extremely heavy changing a pad every 1-2 hours.  Patient has had severe cramping associated with her cycles as well.  Patient has also had profound fatigue.  Patient also reports for several months having a vaginal discharge with odor.  Patient has noticed this predominantly postcoital.  Patient has not had any recent hormonal assessment.  Patient does have minimal thyroid dysfunction.    History  Past Medical History:   Diagnosis Date   • Allergic    • B12 deficiency    • Encounter for insertion of ParaGard IUD 2013   • Headache    • Hyperlipidemia    • Hypothyroidism    • Restless leg syndrome    • Snores    • Urinary tract infection      Current Outpatient Medications on File Prior to Visit   Medication Sig Dispense Refill   • CRANBERRY JUICE EXTRACT PO Take  by mouth Daily.     • cyclobenzaprine (FLEXERIL) 10 MG tablet Take 1 tablet by mouth At Night As Needed for Muscle Spasms. 30 tablet 3   • levothyroxine (SYNTHROID, LEVOTHROID) 25 MCG tablet Take 1 tablet by mouth Daily.  tablet 1   • PARAGARD INTRAUTERINE COPPER IU by Intrauterine route.     • Probiotic Product (PROBIOTIC DAILY PO) Take  by mouth.     • vitamin E 400 UNIT capsule Take 400 Units by mouth Daily.       No current facility-administered medications on file prior to visit.     No Known Allergies  Past Surgical History:   Procedure Laterality Date   •  "COLONOSCOPY N/A 4/4/2017    Procedure: COLONOSCOPY WITH ANOSCOPY;  Surgeon: Cezar Greenberg MD;  Location: [facility] ENDOSCOPY;  Service:      Family History   Problem Relation Age of Onset   • Lung cancer Father    • Other Other         arteriosclerotic cardiovascular disease, cerebrovascular accident   • Breast cancer Other    • Breast cancer Paternal Grandmother    • Thyroid disease Sister    • Colon cancer Neg Hx      Social History     Socioeconomic History   • Marital status:    Tobacco Use   • Smoking status: Never Smoker   • Smokeless tobacco: Never Used   Vaping Use   • Vaping Use: Never used   Substance and Sexual Activity   • Alcohol use: Yes     Alcohol/week: 12.0 standard drinks     Types: 2 Glasses of wine, 7 Cans of beer, 3 Shots of liquor per week     Comment: OCCASSIONALLY-1 DRINK PER DAY.  USUALLY BEER   • Drug use: No   • Sexual activity: Yes     Partners: Male     Birth control/protection: I.U.D.       Physical Examination:  Vital Signs: /62   Ht 165.1 cm (65\")   Wt 61.2 kg (135 lb)   BMI 22.47 kg/m²     General Appearance: alert, appears stated age, and cooperative  Breasts: Not performed.  Abdomen: no masses, no hepatomegaly, no splenomegaly, soft non-tender, no guarding and no rebound tenderness  Pelvic: Not performed.    Data Review:  The following data was reviewed by: Paty Licea MD on 10/21/2021:     Labs:    Imaging:  US Non-ob Transvaginal (10/21/2021 09:52)    Medical Records:  None    Assessment and Plan   Problem List Items Addressed This Visit        Endocrine and Metabolic    Hypothyroidism    Relevant Orders    Thyroid Panel With TSH      Other Visit Diagnoses     Abnormal uterine bleeding (AUB)    -  Primary  Patient with abnormal uterine bleeding with IUD.  Transvaginal ultrasound is obtained today.  Patient has been informed regarding those findings.  Will obtain labs today as discussed.  I discussed with the patient the various options for management of her " symptoms.  Patient is to consider Mirena IUD as discussed.  Instructions and precautions are given.  Patient is to follow-up as discussed.    Relevant Orders    US Non-ob Transvaginal (Completed)    Estradiol    Follicle Stimulating Hormone    Testosterone, Free, Total    Thyroid Panel With TSH    CBC (No Diff)    Dysmenorrhea      Patient with severe dysmenorrhea as noted.  I discussed with patient various treatment options for management of her symptoms.  Transvaginal ultrasound was obtained as well.  Patient is to consider the options as discussed.    Vaginal discharge      Patient with vaginal discharge as noted above.  Prescriptions given for Flagyl.  Instructions and precautions are given.  Patient is to call if no improvement in her symptoms as discussed.    Relevant Medications    metroNIDAZOLE (Flagyl) 500 MG tablet    Fatigue, unspecified type      Patient with profound fatigue as noted.  Will obtain CBC as well as thyroid panel.  Plan pending results.    Relevant Orders    Thyroid Panel With TSH    CBC (No Diff)          Follow Up/Instructions:  Follow up as noted.  Patient was given instructions and counseling regarding her condition or for health maintenance advice. Please see specific information pulled into the AVS if appropriate.     Note: Speech recognition transcription software may have been used to dictate portions of this document.  An attempt at proofreading has been made though minor errors in transcription may still be present.    This note was electronically signed.  Paty Licea M.D.

## 2021-10-24 LAB
ERYTHROCYTE [DISTWIDTH] IN BLOOD BY AUTOMATED COUNT: 12.1 % (ref 12.3–15.4)
ESTRADIOL SERPL-MCNC: 21.2 PG/ML
FSH SERPL-ACNC: 27.3 MIU/ML
FT4I SERPL CALC-MCNC: 2.1 (ref 1.2–4.9)
HCT VFR BLD AUTO: 40.7 % (ref 34–46.6)
HGB BLD-MCNC: 14.1 G/DL (ref 12–15.9)
MCH RBC QN AUTO: 31.4 PG (ref 26.6–33)
MCHC RBC AUTO-ENTMCNC: 34.6 G/DL (ref 31.5–35.7)
MCV RBC AUTO: 90.6 FL (ref 79–97)
PLATELET # BLD AUTO: 271 10*3/MM3 (ref 140–450)
RBC # BLD AUTO: 4.49 10*6/MM3 (ref 3.77–5.28)
T3RU NFR SERPL: 30 % (ref 24–39)
T4 SERPL-MCNC: 6.9 UG/DL (ref 4.5–12)
TESTOST FREE SERPL-MCNC: 1.3 PG/ML (ref 0–4.2)
TESTOST SERPL-MCNC: 28 NG/DL (ref 4–50)
TSH SERPL DL<=0.005 MIU/L-ACNC: 2.84 UIU/ML (ref 0.45–4.5)
WBC # BLD AUTO: 6.17 10*3/MM3 (ref 3.4–10.8)

## 2021-11-02 ENCOUNTER — OFFICE VISIT (OUTPATIENT)
Dept: OBSTETRICS AND GYNECOLOGY | Facility: CLINIC | Age: 46
End: 2021-11-02

## 2021-11-02 VITALS
WEIGHT: 135 LBS | HEIGHT: 65 IN | DIASTOLIC BLOOD PRESSURE: 60 MMHG | SYSTOLIC BLOOD PRESSURE: 112 MMHG | BODY MASS INDEX: 22.49 KG/M2

## 2021-11-02 DIAGNOSIS — N94.6 DYSMENORRHEA: ICD-10-CM

## 2021-11-02 DIAGNOSIS — N93.9 ABNORMAL UTERINE BLEEDING (AUB): Primary | ICD-10-CM

## 2021-11-02 DIAGNOSIS — N95.1 MENOPAUSAL SYMPTOMS: ICD-10-CM

## 2021-11-02 PROCEDURE — 99214 OFFICE O/P EST MOD 30 MIN: CPT | Performed by: OBSTETRICS & GYNECOLOGY

## 2021-11-03 NOTE — PROGRESS NOTES
Chief Complaint  Procedure (Removal of Paragard IUD. )     History of Present Illness:  Patient is 45 y.o.  who presents to [facility] for follow-up evaluation of her ParaGard.  Patient has had worsening of her menstrual cycles with ParaGard.  Patient has had menopausal symptoms as well.  Patient did have recent labs showing her in the early menopausal range.  Patient reports having 3 menstrual cycles last month.  Patient does need contraception as her spouse did not have a vasectomy as planned.    History  Past Medical History:   Diagnosis Date   • Allergic    • B12 deficiency    • Encounter for insertion of ParaGard IUD 2013   • Headache    • Hyperlipidemia    • Hypothyroidism    • Restless leg syndrome    • Snores    • Urinary tract infection      Current Outpatient Medications on File Prior to Visit   Medication Sig Dispense Refill   • CRANBERRY JUICE EXTRACT PO Take  by mouth Daily.     • cyclobenzaprine (FLEXERIL) 10 MG tablet Take 1 tablet by mouth At Night As Needed for Muscle Spasms. 30 tablet 3   • levothyroxine (SYNTHROID, LEVOTHROID) 25 MCG tablet Take 1 tablet by mouth Daily.  tablet 1   • PARAGARD INTRAUTERINE COPPER IU by Intrauterine route.     • Probiotic Product (PROBIOTIC DAILY PO) Take  by mouth.     • vitamin E 400 UNIT capsule Take 400 Units by mouth Daily.       No current facility-administered medications on file prior to visit.     No Known Allergies  Past Surgical History:   Procedure Laterality Date   • COLONOSCOPY N/A 2017    Procedure: COLONOSCOPY WITH ANOSCOPY;  Surgeon: Cezar Greenberg MD;  Location: [facility] ENDOSCOPY;  Service:      Family History   Problem Relation Age of Onset   • Lung cancer Father    • Other Other         arteriosclerotic cardiovascular disease, cerebrovascular accident   • Breast cancer Other    • Breast cancer Paternal Grandmother    • Thyroid disease Sister    • Colon cancer Neg Hx      Social History  "    Socioeconomic History   • Marital status:    Tobacco Use   • Smoking status: Never Smoker   • Smokeless tobacco: Never Used   Vaping Use   • Vaping Use: Never used   Substance and Sexual Activity   • Alcohol use: Yes     Alcohol/week: 12.0 standard drinks     Types: 2 Glasses of wine, 7 Cans of beer, 3 Shots of liquor per week     Comment: OCCASSIONALLY-1 DRINK PER DAY.  USUALLY BEER   • Drug use: No   • Sexual activity: Yes     Partners: Male     Birth control/protection: I.U.D.       Physical Examination:  Vital Signs: /60   Ht 165.1 cm (65\")   Wt 61.2 kg (135 lb)   BMI 22.47 kg/m²     General Appearance: alert, appears stated age, and cooperative  Breasts: Not performed.  Abdomen: no masses, no hepatomegaly, no splenomegaly, soft non-tender, no guarding and no rebound tenderness  Pelvic: Not performed.    Data Review:  The following data was reviewed by: Paty Licea MD on 11/02/2021:     Labs:  Estradiol (10/21/2021 10:21)  Follicle Stimulating Hormone (10/21/2021 10:21)  Testosterone, Free, Total (10/21/2021 10:21)  Thyroid Panel With TSH (10/21/2021 10:21)  CBC (No Diff) (10/21/2021 10:21)    Imaging:    Medical Records:  None    Assessment and Plan   Problem List Items Addressed This Visit     None      Visit Diagnoses     Abnormal uterine bleeding (AUB)    -  Primary  Patient with abnormal uterine bleeding with ParaGard.  I discussed with the patient at length the various options for management of her symptoms.  The patient's hormone levels are only in the early menopausal range.  I have discussed with the patient she is most likely perimenopausal.  I have discussed with the patient still the need for contraception.  Patient desires a trial with Mirena IUD.  Orders placed as noted.  Patient will follow up for removal of her ParaGard and placement of Mirena IUD as discussed.    Relevant Medications    levonorgestrel (Mirena, 52 MG,) 20 MCG/24HR IUD    Dysmenorrhea      Patient with " worsening of her dysmenorrhea as noted.  I discussed with the patient various treatment options.  She desires a trial with Mirena IUD is noted.    Relevant Medications    levonorgestrel (Mirena, 52 MG,) 20 MCG/24HR IUD    Menopausal symptoms      Patient with menopausal symptoms as noted.  I have reviewed her labs with the patient today.  Patient is perimenopausal.  Discussed with the patient the various treatment options.  I have discussed with the patient the need for continued contraception however at this time.  Patient desires removal and replacement of IUD with Mirena IUD given her menorrhagia and dysmenorrhea.    Relevant Medications    levonorgestrel (Mirena, 52 MG,) 20 MCG/24HR IUD          Follow Up/Instructions:  Follow up as noted.  Patient was given instructions and counseling regarding her condition or for health maintenance advice. Please see specific information pulled into the AVS if appropriate.     Note: Speech recognition transcription software may have been used to dictate portions of this document.  An attempt at proofreading has been made though minor errors in transcription may still be present.    This note was electronically signed.  Paty Licea M.D.

## 2021-11-12 ENCOUNTER — OFFICE VISIT (OUTPATIENT)
Dept: OBSTETRICS AND GYNECOLOGY | Facility: CLINIC | Age: 46
End: 2021-11-12

## 2021-11-12 VITALS
DIASTOLIC BLOOD PRESSURE: 60 MMHG | HEIGHT: 65 IN | WEIGHT: 135 LBS | BODY MASS INDEX: 22.49 KG/M2 | SYSTOLIC BLOOD PRESSURE: 116 MMHG

## 2021-11-12 DIAGNOSIS — Z30.433 ENCOUNTER FOR IUD REMOVAL AND REINSERTION: Primary | ICD-10-CM

## 2021-11-12 PROCEDURE — 58301 REMOVE INTRAUTERINE DEVICE: CPT | Performed by: OBSTETRICS & GYNECOLOGY

## 2021-11-12 PROCEDURE — 58300 INSERT INTRAUTERINE DEVICE: CPT | Performed by: OBSTETRICS & GYNECOLOGY

## 2021-11-12 NOTE — PROGRESS NOTES
IUD Removal and Immediate Reinsertion    No LMP recorded. Patient has had an implant.    Date of procedure:  11/12/2021    Risks and benefits discussed? yes  All questions answered? yes  Consents given by the patient  Written consent obtained? yes  Reason for removal: Side effect: menorrhagia, dysmenorrhea    Local anesthesia used:  no    Procedure documentation:    A speculum was placed in order to view the cervix.  A tenaculum did not need to be placed on the anterior cervical lip.  Cervical dilation did not need to be performed in order to access the string.  The IUD string was easily seen.  The string was grasped and the IUD was removed without difficulty.  The IUD did not appear to be adherent to the uterine cavity. It was removed intact.    A sterile speculum was replaced and the cervix was cleansed with an antiseptic solution.  Vaginal discharge was scant.  The anterior lip of the cervix was grasped with a tenaculum and the uterine cavity was gently sounded.  There was no difficulty passing the sound through the cervix.  Cervical dilation did not need to be performed prior to placing the IUD.  The uterus was anteverted and sounded to 8 cms.  The Mirena was then prepared per the manufacturers instructions.    The Mirena was advanced to a point 2 cms from the fundus and then the arms were released from the sheath.  The device was advanced to the fundus and the device was released fully from the sheath.. The string was cut 5 cms in length.  Bleeding from the cervix was scant.    She tolerated the procedure without any difficulty.     Post procedure instructions: It was reviewed that the Mirena will not alter the timing of when she bleeds but it may decrease the quantity of flow and cramps.  Roughly 30% of people will be amenorrheic over time.  Efficacy rate of 99.2% over 5 years was discussed.  Spontaneous expulsion rate of 1-2% was also discussed.  If she has any issue with fever or excessive bleeding or pain  she is to call the office immediately.  Otherwise I would like to see her back in 5 weeks for f/u appt.    This note was electronically signed.  Paty Licea M.D.

## 2021-12-21 ENCOUNTER — OFFICE VISIT (OUTPATIENT)
Dept: OBSTETRICS AND GYNECOLOGY | Facility: CLINIC | Age: 46
End: 2021-12-21

## 2021-12-21 VITALS
HEIGHT: 65 IN | BODY MASS INDEX: 22.49 KG/M2 | WEIGHT: 135 LBS | DIASTOLIC BLOOD PRESSURE: 62 MMHG | SYSTOLIC BLOOD PRESSURE: 118 MMHG

## 2021-12-21 DIAGNOSIS — Z30.431 IUD CHECK UP: Primary | ICD-10-CM

## 2021-12-21 DIAGNOSIS — N93.9 ABNORMAL UTERINE BLEEDING (AUB): ICD-10-CM

## 2021-12-21 DIAGNOSIS — N94.6 DYSMENORRHEA: ICD-10-CM

## 2021-12-21 PROCEDURE — 99213 OFFICE O/P EST LOW 20 MIN: CPT | Performed by: OBSTETRICS & GYNECOLOGY

## 2021-12-21 NOTE — PROGRESS NOTES
Chief Complaint  Follow-up (Follow up check Mirena IUD, inserted on 2021. No complaints. )     History of Present Illness:  Patient is 46 y.o.  who presents to [facility] here for follow-up evaluation of her Mirena IUD. Patient reports since insertion she has not had a menstrual cycle. Patient denies any vaginal bleeding or spotting. Patient denies any pain or cramping. Patient previously had heavy menstrual cycles. Patient denies any fever or chills. The patient has not checked IUD strings herself.    History  Past Medical History:   Diagnosis Date   • Allergic    • B12 deficiency    • Encounter for insertion of ParaGard IUD 2013   • Headache    • Hyperlipidemia    • Hypothyroidism    • Restless leg syndrome    • Snores    • Urinary tract infection      Current Outpatient Medications on File Prior to Visit   Medication Sig Dispense Refill   • CRANBERRY JUICE EXTRACT PO Take  by mouth Daily.     • cyclobenzaprine (FLEXERIL) 10 MG tablet Take 1 tablet by mouth At Night As Needed for Muscle Spasms. 30 tablet 3   • levonorgestrel (Mirena, 52 MG,) 20 MCG/24HR IUD Take to prescriber's office 1 each 0   • levothyroxine (SYNTHROID, LEVOTHROID) 25 MCG tablet Take 1 tablet by mouth Daily. 109992 90 tablet 1   • PARAGARD INTRAUTERINE COPPER IU by Intrauterine route.     • Probiotic Product (PROBIOTIC DAILY PO) Take  by mouth.     • vitamin E 400 UNIT capsule Take 400 Units by mouth Daily.       No current facility-administered medications on file prior to visit.     No Known Allergies  Past Surgical History:   Procedure Laterality Date   • COLONOSCOPY N/A 2017    Procedure: COLONOSCOPY WITH ANOSCOPY;  Surgeon: Cezar Greenberg MD;  Location: [facility] ENDOSCOPY;  Service:      Family History   Problem Relation Age of Onset   • Lung cancer Father    • Other Other         arteriosclerotic cardiovascular disease, cerebrovascular accident   • Breast cancer Other    • Breast  "cancer Paternal Grandmother    • Thyroid disease Sister    • Colon cancer Neg Hx      Social History     Socioeconomic History   • Marital status:    Tobacco Use   • Smoking status: Never Smoker   • Smokeless tobacco: Never Used   Vaping Use   • Vaping Use: Never used   Substance and Sexual Activity   • Alcohol use: Yes     Alcohol/week: 12.0 standard drinks     Types: 2 Glasses of wine, 7 Cans of beer, 3 Shots of liquor per week     Comment: OCCASSIONALLY-1 DRINK PER DAY.  USUALLY BEER   • Drug use: No   • Sexual activity: Yes     Partners: Male     Birth control/protection: I.U.D.       Physical Examination:  Vital Signs: /62   Ht 165.1 cm (65\")   Wt 61.2 kg (135 lb)   BMI 22.47 kg/m²     General Appearance: alert, appears stated age, and cooperative  Breasts: Not performed.  Abdomen: no masses, no hepatomegaly, no splenomegaly, soft non-tender, no guarding and no rebound tenderness  Pelvic: Clinical staff was present for exam  External genitalia:  normal appearance of the external genitalia including Bartholin's and Burdett's glands.  :  urethral meatus normal;  Vaginal:  normal pink mucosa without prolapse or lesions.  Cervix:  normal appearance. IUD string present - 5 cms in length;  Uterus:  normal size, shape and consistency.  Adnexa:  normal bimanual exam of the adnexa.    Data Review:  The following data was reviewed by: Paty Licea MD on 12/21/2021:     Labs:    Imaging:    Medical Records:  None    Assessment and Plan   Problem List Items Addressed This Visit     None      Visit Diagnoses     IUD check up    -  Primary  Normal examination. Patient has been informed and reassured regarding the findings. Instructions and precautions have been given.    Abnormal uterine bleeding (AUB)      Patient with improvement in her abnormal uterine bleeding. Instructions and precautions have been given. Patient is to follow-up as discussed.    Dysmenorrhea      Patient with improvement in her " dysmenorrhea. Patient is reassured regarding her IUD. Instructions and precautions were given. Patient is to follow-up as discussed.        I spent 25 minutes caring for Lucille on this date of service. This time includes time spent by me in the following activities:performing a medically appropriate examination and/or evaluation , counseling and educating the patient/family/caregiver and documenting information in the medical record    Follow Up/Instructions:  Follow up as noted.  Patient was given instructions and counseling regarding her condition or for health maintenance advice. Please see specific information pulled into the AVS if appropriate.     Note: Speech recognition transcription software may have been used to dictate portions of this document.  An attempt at proofreading has been made though minor errors in transcription may still be present.    This note was electronically signed.  Paty Licea M.D.

## 2022-01-09 PROCEDURE — U0004 COV-19 TEST NON-CDC HGH THRU: HCPCS | Performed by: FAMILY MEDICINE

## 2022-02-17 ENCOUNTER — TELEPHONE (OUTPATIENT)
Dept: INTERNAL MEDICINE | Facility: CLINIC | Age: 47
End: 2022-02-17

## 2022-02-17 NOTE — TELEPHONE ENCOUNTER
Caller: Lucille Linder    Relationship: Self    Best call back number: [phone]    What orders are you requesting (i.e. lab or imaging): LAB     In what timeframe would the patient need to come in:   PT HAS PHY SCHEDULED FOR 2/28, AND WOULD LIKE TO COME IN BEFOREHAND TO GET LAB WORK DONE.    Where will you receive your lab/imaging services:   OFFICE    Additional notes:

## 2022-02-18 DIAGNOSIS — Z00.00 ROUTINE GENERAL MEDICAL EXAMINATION AT A HEALTH CARE FACILITY: Primary | ICD-10-CM

## 2022-02-26 ENCOUNTER — LAB (OUTPATIENT)
Dept: LAB | Facility: HOSPITAL | Age: 47
End: 2022-02-26

## 2022-02-26 LAB
25(OH)D3 SERPL-MCNC: 22.3 NG/ML (ref 30–100)
ALBUMIN SERPL-MCNC: 4.9 G/DL (ref 3.5–5.2)
ALBUMIN/GLOB SERPL: 1.8 G/DL
ALP SERPL-CCNC: 56 U/L (ref 39–117)
ALT SERPL W P-5'-P-CCNC: 14 U/L (ref 1–33)
ANION GAP SERPL CALCULATED.3IONS-SCNC: 8 MMOL/L (ref 5–15)
AST SERPL-CCNC: 18 U/L (ref 1–32)
BASOPHILS # BLD AUTO: 0.05 10*3/MM3 (ref 0–0.2)
BASOPHILS NFR BLD AUTO: 0.9 % (ref 0–1.5)
BILIRUB SERPL-MCNC: 1.6 MG/DL (ref 0–1.2)
BUN SERPL-MCNC: 16 MG/DL (ref 6–20)
BUN/CREAT SERPL: 18.4 (ref 7–25)
CALCIUM SPEC-SCNC: 9.6 MG/DL (ref 8.6–10.5)
CHLORIDE SERPL-SCNC: 101 MMOL/L (ref 98–107)
CHOLEST SERPL-MCNC: 196 MG/DL (ref 0–200)
CO2 SERPL-SCNC: 26 MMOL/L (ref 22–29)
CREAT SERPL-MCNC: 0.87 MG/DL (ref 0.57–1)
DEPRECATED RDW RBC AUTO: 39.4 FL (ref 37–54)
EOSINOPHIL # BLD AUTO: 0.32 10*3/MM3 (ref 0–0.4)
EOSINOPHIL NFR BLD AUTO: 5.5 % (ref 0.3–6.2)
ERYTHROCYTE [DISTWIDTH] IN BLOOD BY AUTOMATED COUNT: 11.9 % (ref 12.3–15.4)
GFR SERPL CREATININE-BSD FRML MDRD: 70 ML/MIN/1.73
GLOBULIN UR ELPH-MCNC: 2.7 GM/DL
GLUCOSE SERPL-MCNC: 81 MG/DL (ref 65–99)
HBA1C MFR BLD: 5.4 % (ref 4.8–5.6)
HCT VFR BLD AUTO: 43.6 % (ref 34–46.6)
HDLC SERPL-MCNC: 86 MG/DL (ref 40–60)
HGB BLD-MCNC: 15 G/DL (ref 12–15.9)
IMM GRANULOCYTES # BLD AUTO: 0.01 10*3/MM3 (ref 0–0.05)
IMM GRANULOCYTES NFR BLD AUTO: 0.2 % (ref 0–0.5)
LDLC SERPL CALC-MCNC: 100 MG/DL (ref 0–100)
LDLC/HDLC SERPL: 1.16 {RATIO}
LYMPHOCYTES # BLD AUTO: 1.6 10*3/MM3 (ref 0.7–3.1)
LYMPHOCYTES NFR BLD AUTO: 27.6 % (ref 19.6–45.3)
MCH RBC QN AUTO: 31.2 PG (ref 26.6–33)
MCHC RBC AUTO-ENTMCNC: 34.4 G/DL (ref 31.5–35.7)
MCV RBC AUTO: 90.6 FL (ref 79–97)
MONOCYTES # BLD AUTO: 0.43 10*3/MM3 (ref 0.1–0.9)
MONOCYTES NFR BLD AUTO: 7.4 % (ref 5–12)
NEUTROPHILS NFR BLD AUTO: 3.39 10*3/MM3 (ref 1.7–7)
NEUTROPHILS NFR BLD AUTO: 58.4 % (ref 42.7–76)
NRBC BLD AUTO-RTO: 0 /100 WBC (ref 0–0.2)
PLATELET # BLD AUTO: 264 10*3/MM3 (ref 140–450)
PMV BLD AUTO: 9.8 FL (ref 6–12)
POTASSIUM SERPL-SCNC: 4.6 MMOL/L (ref 3.5–5.2)
PROT SERPL-MCNC: 7.6 G/DL (ref 6–8.5)
RBC # BLD AUTO: 4.81 10*6/MM3 (ref 3.77–5.28)
SODIUM SERPL-SCNC: 135 MMOL/L (ref 136–145)
TRIGL SERPL-MCNC: 51 MG/DL (ref 0–150)
TSH SERPL DL<=0.05 MIU/L-ACNC: 1.71 UIU/ML (ref 0.27–4.2)
VIT B12 BLD-MCNC: 386 PG/ML (ref 211–946)
VLDLC SERPL-MCNC: 10 MG/DL (ref 5–40)
WBC NRBC COR # BLD: 5.8 10*3/MM3 (ref 3.4–10.8)

## 2022-02-26 PROCEDURE — 82607 VITAMIN B-12: CPT | Performed by: INTERNAL MEDICINE

## 2022-02-26 PROCEDURE — 80050 GENERAL HEALTH PANEL: CPT | Performed by: INTERNAL MEDICINE

## 2022-02-26 PROCEDURE — 36415 COLL VENOUS BLD VENIPUNCTURE: CPT | Performed by: INTERNAL MEDICINE

## 2022-02-26 PROCEDURE — 80061 LIPID PANEL: CPT | Performed by: INTERNAL MEDICINE

## 2022-02-26 PROCEDURE — 82306 VITAMIN D 25 HYDROXY: CPT | Performed by: INTERNAL MEDICINE

## 2022-02-26 PROCEDURE — 83036 HEMOGLOBIN GLYCOSYLATED A1C: CPT | Performed by: INTERNAL MEDICINE

## 2022-02-28 ENCOUNTER — OFFICE VISIT (OUTPATIENT)
Dept: INTERNAL MEDICINE | Facility: CLINIC | Age: 47
End: 2022-02-28

## 2022-02-28 VITALS
HEIGHT: 65 IN | WEIGHT: 138 LBS | HEART RATE: 68 BPM | TEMPERATURE: 97.3 F | RESPIRATION RATE: 15 BRPM | SYSTOLIC BLOOD PRESSURE: 125 MMHG | BODY MASS INDEX: 22.99 KG/M2 | DIASTOLIC BLOOD PRESSURE: 73 MMHG | OXYGEN SATURATION: 97 %

## 2022-02-28 DIAGNOSIS — Z00.00 ROUTINE GENERAL MEDICAL EXAMINATION AT A HEALTH CARE FACILITY: Primary | ICD-10-CM

## 2022-02-28 DIAGNOSIS — E03.8 ADULT ONSET HYPOTHYROIDISM: ICD-10-CM

## 2022-02-28 DIAGNOSIS — E55.9 VITAMIN D DEFICIENCY: ICD-10-CM

## 2022-02-28 DIAGNOSIS — E53.8 VITAMIN B12 DEFICIENCY: ICD-10-CM

## 2022-02-28 PROCEDURE — 99396 PREV VISIT EST AGE 40-64: CPT | Performed by: INTERNAL MEDICINE

## 2022-02-28 RX ORDER — LEVOTHYROXINE SODIUM 0.03 MG/1
25 TABLET ORAL DAILY
Qty: 90 TABLET | Refills: 2 | Status: SHIPPED | OUTPATIENT
Start: 2022-02-28 | End: 2022-07-28 | Stop reason: SDUPTHER

## 2022-02-28 RX ORDER — ERGOCALCIFEROL 1.25 MG/1
50000 CAPSULE ORAL WEEKLY
Qty: 8 CAPSULE | Refills: 0 | Status: SHIPPED | OUTPATIENT
Start: 2022-02-28 | End: 2022-05-10

## 2022-02-28 RX ORDER — CYCLOBENZAPRINE HCL 10 MG
10 TABLET ORAL NIGHTLY PRN
Qty: 30 TABLET | Refills: 2 | Status: SHIPPED | OUTPATIENT
Start: 2022-02-28 | End: 2023-03-06 | Stop reason: SDUPTHER

## 2022-02-28 NOTE — PROGRESS NOTES
Subjective   Lucille Linder is a 46 y.o. female.     Chief Complaint   Patient presents with   • Annual Exam       History of Present Illness   Patient is here for a physical, she had lab work for her cholesterol and thyroid and had a mammogram done, last colonoscopy was at age 41 with repeat at age 51  Review of Systems   Constitutional: Negative for appetite change, fatigue and fever.   HENT: Negative for congestion, ear discharge, ear pain, sinus pressure and sore throat.    Eyes: Negative for pain and discharge.   Respiratory: Negative for cough, chest tightness, shortness of breath and wheezing.    Cardiovascular: Negative for chest pain, palpitations and leg swelling.   Gastrointestinal: Negative for abdominal pain, blood in stool, constipation, diarrhea and nausea.   Endocrine: Negative for cold intolerance and heat intolerance.   Genitourinary: Negative for dysuria, flank pain and frequency.   Musculoskeletal: Negative for back pain and joint swelling.   Skin: Negative for color change.   Allergic/Immunologic: Negative for environmental allergies and food allergies.   Neurological: Negative for dizziness, weakness, numbness and headaches.   Hematological: Negative for adenopathy. Does not bruise/bleed easily.   Psychiatric/Behavioral: Negative for behavioral problems and dysphoric mood. The patient is not nervous/anxious.        Past Medical History:   Diagnosis Date   • Allergic    • B12 deficiency 2015   • Encounter for insertion of ParaGard IUD 03/2013   • Headache    • Hyperlipidemia    • Hypothyroidism 2015   • Restless leg syndrome 1995   • Snores    • Urinary tract infection        Past Surgical History:   Procedure Laterality Date   • COLONOSCOPY N/A 4/4/2017    Procedure: COLONOSCOPY WITH ANOSCOPY;  Surgeon: Cezar Greenberg MD;  Location: [facility] ENDOSCOPY;  Service:        Family History   Problem Relation Age of Onset   • Lung cancer Father    • Other Other         arteriosclerotic  "cardiovascular disease, cerebrovascular accident   • Breast cancer Other    • Breast cancer Paternal Grandmother    • Thyroid disease Sister    • Colon cancer Neg Hx         reports that she has never smoked. She has never used smokeless tobacco. She reports current alcohol use of about 12.0 standard drinks of alcohol per week. She reports that she does not use drugs.    No Known Allergies        Current Outpatient Medications:   •  CRANBERRY JUICE EXTRACT PO, Take  by mouth Daily., Disp: , Rfl:   •  levonorgestrel (Mirena, 52 MG,) 20 MCG/24HR IUD, Take to prescriber's office, Disp: 1 each, Rfl: 0  •  levothyroxine (SYNTHROID, LEVOTHROID) 25 MCG tablet, Take 1 tablet by mouth Daily. 200001, Disp: 90 tablet, Rfl: 2  •  Probiotic Product (PROBIOTIC DAILY PO), Take  by mouth., Disp: , Rfl:   •  vitamin E 400 UNIT capsule, Take 400 Units by mouth Daily., Disp: , Rfl:   •  cyclobenzaprine (FLEXERIL) 10 MG tablet, Take 1 tablet by mouth At Night As Needed for Muscle Spasms., Disp: 30 tablet, Rfl: 2  •  vitamin D (ERGOCALCIFEROL) 1.25 MG (28036 UT) capsule capsule, Take 1 capsule by mouth 1 (One) Time Per Week., Disp: 8 capsule, Rfl: 0      Objective   Blood pressure 125/73, pulse 68, temperature 97.3 °F (36.3 °C), resp. rate 15, height 165.1 cm (65\"), weight 62.6 kg (138 lb), SpO2 97 %, not currently breastfeeding.    Physical Exam  Vitals and nursing note reviewed.   Constitutional:       General: She is not in acute distress.     Appearance: Normal appearance. She is not diaphoretic.   HENT:      Head: Normocephalic and atraumatic.      Right Ear: External ear normal.      Left Ear: External ear normal.      Nose: Nose normal.   Eyes:      Extraocular Movements: Extraocular movements intact.      Conjunctiva/sclera: Conjunctivae normal.   Neck:      Trachea: Trachea normal.   Cardiovascular:      Rate and Rhythm: Normal rate and regular rhythm.      Heart sounds: Normal heart sounds.   Pulmonary:      Effort: Pulmonary " effort is normal. No respiratory distress.      Breath sounds: Normal breath sounds.   Abdominal:      General: Abdomen is flat.   Musculoskeletal:      Cervical back: Neck supple.      Comments: Moves all limbs   Skin:     General: Skin is warm.   Neurological:      Mental Status: She is alert and oriented to person, place, and time.      Comments: No gross motor or sensory deficits         Patient's Body mass index is 22.96 kg/m². indicating that she is within normal range (BMI 18.5-24.9). No BMI management plan needed..      Results for orders placed or performed in visit on 02/18/22   Comprehensive Metabolic Panel    Specimen: Blood   Result Value Ref Range    Glucose 81 65 - 99 mg/dL    BUN 16 6 - 20 mg/dL    Creatinine 0.87 0.57 - 1.00 mg/dL    Sodium 135 (L) 136 - 145 mmol/L    Potassium 4.6 3.5 - 5.2 mmol/L    Chloride 101 98 - 107 mmol/L    CO2 26.0 22.0 - 29.0 mmol/L    Calcium 9.6 8.6 - 10.5 mg/dL    Total Protein 7.6 6.0 - 8.5 g/dL    Albumin 4.90 3.50 - 5.20 g/dL    ALT (SGPT) 14 1 - 33 U/L    AST (SGOT) 18 1 - 32 U/L    Alkaline Phosphatase 56 39 - 117 U/L    Total Bilirubin 1.6 (H) 0.0 - 1.2 mg/dL    eGFR Non African Amer 70 >60 mL/min/1.73    Globulin 2.7 gm/dL    A/G Ratio 1.8 g/dL    BUN/Creatinine Ratio 18.4 7.0 - 25.0    Anion Gap 8.0 5.0 - 15.0 mmol/L   Lipid Panel    Specimen: Blood   Result Value Ref Range    Total Cholesterol 196 0 - 200 mg/dL    Triglycerides 51 0 - 150 mg/dL    HDL Cholesterol 86 (H) 40 - 60 mg/dL    LDL Cholesterol  100 0 - 100 mg/dL    VLDL Cholesterol 10 5 - 40 mg/dL    LDL/HDL Ratio 1.16    Hemoglobin A1c    Specimen: Blood   Result Value Ref Range    Hemoglobin A1C 5.40 4.80 - 5.60 %   TSH    Specimen: Blood   Result Value Ref Range    TSH 1.710 0.270 - 4.200 uIU/mL   Vitamin B12    Specimen: Blood   Result Value Ref Range    Vitamin B-12 386 211 - 946 pg/mL   Vitamin D 25 Hydroxy    Specimen: Blood   Result Value Ref Range    25 Hydroxy, Vitamin D 22.3 (L) 30.0 -  100.0 ng/ml   CBC Auto Differential    Specimen: Blood   Result Value Ref Range    WBC 5.80 3.40 - 10.80 10*3/mm3    RBC 4.81 3.77 - 5.28 10*6/mm3    Hemoglobin 15.0 12.0 - 15.9 g/dL    Hematocrit 43.6 34.0 - 46.6 %    MCV 90.6 79.0 - 97.0 fL    MCH 31.2 26.6 - 33.0 pg    MCHC 34.4 31.5 - 35.7 g/dL    RDW 11.9 (L) 12.3 - 15.4 %    RDW-SD 39.4 37.0 - 54.0 fl    MPV 9.8 6.0 - 12.0 fL    Platelets 264 140 - 450 10*3/mm3    Neutrophil % 58.4 42.7 - 76.0 %    Lymphocyte % 27.6 19.6 - 45.3 %    Monocyte % 7.4 5.0 - 12.0 %    Eosinophil % 5.5 0.3 - 6.2 %    Basophil % 0.9 0.0 - 1.5 %    Immature Grans % 0.2 0.0 - 0.5 %    Neutrophils, Absolute 3.39 1.70 - 7.00 10*3/mm3    Lymphocytes, Absolute 1.60 0.70 - 3.10 10*3/mm3    Monocytes, Absolute 0.43 0.10 - 0.90 10*3/mm3    Eosinophils, Absolute 0.32 0.00 - 0.40 10*3/mm3    Basophils, Absolute 0.05 0.00 - 0.20 10*3/mm3    Immature Grans, Absolute 0.01 0.00 - 0.05 10*3/mm3    nRBC 0.0 0.0 - 0.2 /100 WBC         Assessment/Plan   Diagnoses and all orders for this visit:    1. Routine general medical examination at a health care facility (Primary)    2. Adult onset hypothyroidism  -     levothyroxine (SYNTHROID, LEVOTHROID) 25 MCG tablet; Take 1 tablet by mouth Daily. 200001  Dispense: 90 tablet; Refill: 2  -     TSH    3. Vitamin B12 deficiency  -     Vitamin B12    4. Vitamin D deficiency  -     vitamin D (ERGOCALCIFEROL) 1.25 MG (84150 UT) capsule capsule; Take 1 capsule by mouth 1 (One) Time Per Week.  Dispense: 8 capsule; Refill: 0  -     Vitamin D 25 Hydroxy    Other orders  -     cyclobenzaprine (FLEXERIL) 10 MG tablet; Take 1 tablet by mouth At Night As Needed for Muscle Spasms.  Dispense: 30 tablet; Refill: 2      plan:  1.physical: Physical exam conducted today,   reviewed fasting lab work, her last colonoscopy was at age 41 and was advised to repeat it at age 51,  Impression: healthy adult Patient. Currently, patient eats a healthy diet. Screening lab work includes  glucose, lipid profile , complete blood count and comprehensive panel . The risks and benefits of immunizations were discussed and immunizations are up to date. Advice and education were given regarding nutrition and aerobic exercise. Patient discussion: discussed with the patient.  Physical with preventive exam as well as age and risk appropriate counseling completed.   Patient Discussion: plan discussed with the patient, follow-up visit needed in one year. Medication Review and medication list updated  Advised Monthly self breast assessment and annual breast imaging and Calcium, 600 mg/ Vit. D, 400 IU daily; regular weight-bearing exercise             Paty Reinoso MD

## 2022-03-23 ENCOUNTER — OFFICE VISIT (OUTPATIENT)
Dept: OBSTETRICS AND GYNECOLOGY | Facility: CLINIC | Age: 47
End: 2022-03-23

## 2022-03-23 VITALS
SYSTOLIC BLOOD PRESSURE: 112 MMHG | BODY MASS INDEX: 22.66 KG/M2 | HEIGHT: 65 IN | DIASTOLIC BLOOD PRESSURE: 62 MMHG | WEIGHT: 136 LBS

## 2022-03-23 DIAGNOSIS — N89.8 VAGINAL ODOR: Primary | ICD-10-CM

## 2022-03-23 DIAGNOSIS — N95.1 MENOPAUSAL SYMPTOMS: ICD-10-CM

## 2022-03-23 PROCEDURE — 99214 OFFICE O/P EST MOD 30 MIN: CPT | Performed by: OBSTETRICS & GYNECOLOGY

## 2022-03-25 LAB
A VAGINAE DNA VAG QL NAA+PROBE: NORMAL SCORE
BVAB2 DNA VAG QL NAA+PROBE: NORMAL SCORE
C ALBICANS DNA VAG QL NAA+PROBE: NEGATIVE
C GLABRATA DNA VAG QL NAA+PROBE: NEGATIVE
C TRACH DNA VAG QL NAA+PROBE: NEGATIVE
MEGA1 DNA VAG QL NAA+PROBE: NORMAL SCORE
N GONORRHOEA DNA VAG QL NAA+PROBE: NEGATIVE
T VAGINALIS DNA VAG QL NAA+PROBE: NEGATIVE

## 2022-03-28 NOTE — PROGRESS NOTES
Chief Complaint  Vaginal Odor (Patient complains of vaginal odor x 6 weeks, advised symptom is worse after intercourse. Patient also complains of hot flashes x 1 week. )     History of Present Illness:  Patient is 46 y.o.  who presents to [facility] here for evaluation of a vaginal odor which has been present for several weeks.  Patient reports it is worse around the time of intercourse.  Patient is not aware of any vaginal discharge.  Patient does report the odor is foul nature.  Patient has also been having hot flashes for the last week.  Patient is inquiring regarding the etiology of her hot flashes.  Patient is concerned there may be other issues besides menopause.  Patient did have recent labs with her primary care provider.  Patient did have an FSH in October  consistent with menopause.  Patient also reports she has been taking probiotics for the last 2 weeks.  She has not had any vaginal bleeding.    History  Past Medical History:   Diagnosis Date   • Allergic    • B12 deficiency    • Encounter for insertion of ParaGard IUD 2013   • Headache    • Hyperlipidemia    • Hypothyroidism    • Migraine    • Ovarian cyst    • Restless leg syndrome    • Snores    • Urinary tract infection    • Varicella      Current Outpatient Medications on File Prior to Visit   Medication Sig Dispense Refill   • CRANBERRY JUICE EXTRACT PO Take  by mouth Daily.     • cyclobenzaprine (FLEXERIL) 10 MG tablet Take 1 tablet by mouth At Night As Needed for Muscle Spasms. 30 tablet 2   • levonorgestrel (Mirena, 52 MG,) 20 MCG/24HR IUD Take to prescriber's office 1 each 0   • levothyroxine (SYNTHROID, LEVOTHROID) 25 MCG tablet Take 1 tablet by mouth Daily.  tablet 2   • Probiotic Product (PROBIOTIC DAILY PO) Take  by mouth.     • vitamin D (ERGOCALCIFEROL) 1.25 MG (52598 UT) capsule capsule Take 1 capsule by mouth 1 (One) Time Per Week. 8 capsule 0   • vitamin E 400 UNIT capsule Take  "400 Units by mouth Daily.       No current facility-administered medications on file prior to visit.     No Known Allergies  Past Surgical History:   Procedure Laterality Date   • COLONOSCOPY N/A 04/04/2017    Procedure: COLONOSCOPY WITH ANOSCOPY;  Surgeon: Cezar Greenberg MD;  Location: [facility] ENDOSCOPY;  Service:    • WISDOM TOOTH EXTRACTION       Family History   Problem Relation Age of Onset   • Lung cancer Father    • Other Other         arteriosclerotic cardiovascular disease, cerebrovascular accident   • Breast cancer Other    • Breast cancer Paternal Grandmother    • Thyroid disease Sister    • Melanoma Sister    • Colon cancer Neg Hx      Social History     Socioeconomic History   • Marital status:    Tobacco Use   • Smoking status: Never Smoker   • Smokeless tobacco: Never Used   Vaping Use   • Vaping Use: Never used   Substance and Sexual Activity   • Alcohol use: Yes     Alcohol/week: 9.0 standard drinks     Types: 2 Glasses of wine, 4 Cans of beer, 3 Shots of liquor per week     Comment: OCCASSIONALLY-1 DRINK PER DAY.  USUALLY BEER   • Drug use: No   • Sexual activity: Yes     Partners: Male     Birth control/protection: I.U.D.       Physical Examination:  Vital Signs: /62   Ht 165.1 cm (65\")   Wt 61.7 kg (136 lb)   BMI 22.63 kg/m²     General Appearance: alert, appears stated age, and cooperative  Breasts: Not performed.  Abdomen: no masses, no hepatomegaly, no splenomegaly, soft non-tender, no guarding and no rebound tenderness  Pelvic: Clinical staff was present for exam  External genitalia:  normal appearance of the external genitalia including Bartholin's and Scotts's glands.  :  urethral meatus normal;  Vaginal:  discharge present -  mucousy, clear and +odor;  Cervix:  normal appearance. IUD string present - 3 cms in length;  Uterus:  normal size, shape and consistency.  Adnexa:  normal bimanual exam of the adnexa.  Cultures obtained    Data Review:  The following data was reviewed " by: Paty Licea MD on 03/23/2022:     Labs:  CBC & Differential (02/26/2022 10:24)  Comprehensive Metabolic Panel (02/26/2022 10:24)  Lipid Panel (02/26/2022 10:24)  Hemoglobin A1c (02/26/2022 10:24)  TSH (02/26/2022 10:24)  Vitamin B12 (02/26/2022 10:24)  Vitamin D 25 Hydroxy (02/26/2022 10:24)  TSH (02/28/2022 15:58)  Vitamin B12 (02/28/2022 15:58)  Vitamin D 25 Hydroxy (02/28/2022 15:58)  Follicle Stimulating Hormone (10/21/2021 10:21)    Imaging:    Medical Records:  None    Assessment and Plan   Problem List Items Addressed This Visit    None     Visit Diagnoses     Vaginal odor    -  Primary  We will send cultures as noted.  I discussed with the patient continuation of her probiotics.  We will await the results of her vaginal swabs as discussed.    Relevant Orders    NuSwab VG+ - Swab, Vagina (Completed)    Menopausal symptoms      The various options for the management of menopausal symptoms was discussed.  The medical treatment options discussed include HRT, SSRIs, SSNRIs, clonidine, and gabapentin.  The risks and benefits were discussed including the findings from the WHI study.  The increased risk of breast cancer, CAD, stroke, and VTE events were discussed for combination therapy vs the increased risk of CV events and breast cancer not being seen in the estrogen only group.  The lowest effective dose for the shortest duration of treatment was discussed in regards to HRT.  Other alternatives including otc supplements and lifestyle changes were also discussed.  Local estrogen therapy to relieve atrophic vaginal symptoms was discussed was well as other alternatives.  The patient's labs have been reviewed with the patient as noted.  Instructions and precautions have been given.  Patient is to call if she desires any treatment as discussed.          Follow Up/Instructions:  Follow up as noted.  Patient was given instructions and counseling regarding her condition or for health maintenance advice. Please see  specific information pulled into the AVS if appropriate.     Note: Speech recognition transcription software may have been used to dictate portions of this document.  An attempt at proofreading has been made though minor errors in transcription may still be present.    This note was electronically signed.  Paty Licea M.D.

## 2022-05-10 ENCOUNTER — OFFICE VISIT (OUTPATIENT)
Dept: INTERNAL MEDICINE | Facility: CLINIC | Age: 47
End: 2022-05-10

## 2022-05-10 ENCOUNTER — HOSPITAL ENCOUNTER (OUTPATIENT)
Dept: GENERAL RADIOLOGY | Facility: HOSPITAL | Age: 47
Discharge: HOME | End: 2022-05-10
Admitting: INTERNAL MEDICINE

## 2022-05-10 VITALS
WEIGHT: 137 LBS | HEART RATE: 74 BPM | TEMPERATURE: 97.3 F | OXYGEN SATURATION: 97 % | RESPIRATION RATE: 16 BRPM | HEIGHT: 65 IN | DIASTOLIC BLOOD PRESSURE: 83 MMHG | SYSTOLIC BLOOD PRESSURE: 121 MMHG | BODY MASS INDEX: 22.82 KG/M2

## 2022-05-10 DIAGNOSIS — E03.8 ADULT ONSET HYPOTHYROIDISM: ICD-10-CM

## 2022-05-10 DIAGNOSIS — R23.2 HOT FLASHES: Primary | ICD-10-CM

## 2022-05-10 DIAGNOSIS — R39.9 URINARY SYMPTOM OR SIGN: ICD-10-CM

## 2022-05-10 LAB
BILIRUB BLD-MCNC: NEGATIVE MG/DL
CLARITY, POC: CLEAR
COLOR UR: YELLOW
EXPIRATION DATE: NORMAL
GLUCOSE UR STRIP-MCNC: NEGATIVE MG/DL
KETONES UR QL: NEGATIVE
LEUKOCYTE EST, POC: NEGATIVE
Lab: NORMAL
NITRITE UR-MCNC: NEGATIVE MG/ML
PH UR: 6 [PH] (ref 5–8)
PROT UR STRIP-MCNC: NEGATIVE MG/DL
RBC # UR STRIP: NEGATIVE /UL
SP GR UR: 1 (ref 1–1.03)
UROBILINOGEN UR QL: NORMAL

## 2022-05-10 PROCEDURE — 71046 X-RAY EXAM CHEST 2 VIEWS: CPT

## 2022-05-10 PROCEDURE — 99214 OFFICE O/P EST MOD 30 MIN: CPT | Performed by: INTERNAL MEDICINE

## 2022-05-10 PROCEDURE — 81003 URINALYSIS AUTO W/O SCOPE: CPT | Performed by: INTERNAL MEDICINE

## 2022-05-10 NOTE — TELEPHONE ENCOUNTER
Patient called this afternoon stating that she is still having issues with her back and would like to know if Dr. Reinoso would like her to come back and be seen, or if she could refer her to Physical Therapy.   Detail Level: Zone Otc Regimen: Vanicream ZBar

## 2022-05-10 NOTE — PROGRESS NOTES
"Chief Complaint  Hot Flashes    Subjective          Lucille Amira Linder presents to [facility]  History of Present Illness   Patient is here complaining of feeling hot for nearly 5 weeks, she states she had these hot flashes which were lasting for the night and between 15 to 20/day, and it would last for a few minutes and then she would feel cold and it also woke her up at night, after 5 weeks it suddenly went away and it has not returned as yet, she was seen by gynecology recently, is also to follow-up on hypothyroidism, she also states she had burning sensation while urinating a few days ago and would like to get that checked, she denies any fever  Answers for HPI/ROS submitted by the patient on 5/7/2022  Please describe your symptoms.: I had what I thought were hot flashes for about 5 weeks. I was having a minimum of 15-20 per day, day and night. I would get very hot, like I was filling up with heat, which would last several minutes, and then I would feel very chilled for a few minutes. They were happening what felt like all day long and also waking me up several times per night. Thank you!  Have you had these symptoms before?: No  How long have you been having these symptoms?: Greater than 2 weeks  Please list any medications you are currently taking for this condition.: Levothyroxin, Vitamin e, Cranberry extract, Probiotic, B complex vitamin  Please describe any probable cause for these symptoms. : Im not sure, possibly UTI? Maybe thyroid related? Hormone related? Menopause?  What is the primary reason for your visit?: Other      Objective   Vital Signs:  /83   Pulse 74   Temp 97.3 °F (36.3 °C)   Resp 16   Ht 165.1 cm (65\")   Wt 62.1 kg (137 lb)   SpO2 97%   BMI 22.80 kg/m²     BMI is within normal parameters. No follow-up required.      Physical Exam  Vitals and nursing note reviewed.   Constitutional:       General: She is not in acute distress.     " Appearance: Normal appearance. She is not diaphoretic.   HENT:      Head: Normocephalic and atraumatic.      Right Ear: External ear normal.      Left Ear: External ear normal.      Nose: Nose normal.   Eyes:      Extraocular Movements: Extraocular movements intact.      Conjunctiva/sclera: Conjunctivae normal.   Neck:      Trachea: Trachea normal.   Cardiovascular:      Rate and Rhythm: Normal rate and regular rhythm.      Heart sounds: Normal heart sounds.   Pulmonary:      Effort: Pulmonary effort is normal. No respiratory distress.      Breath sounds: Normal breath sounds.   Abdominal:      General: Abdomen is flat.   Musculoskeletal:      Cervical back: Neck supple.      Comments: Moves all limbs   Skin:     General: Skin is warm.   Neurological:      Mental Status: She is alert and oriented to person, place, and time.      Comments: No gross motor or sensory deficits        Result Review :     CMP    CMP 2/26/22 5/10/22   Glucose 81 86   BUN 16 13   Creatinine 0.87 0.89   eGFR Non African Am 70    Sodium 135 (A) 140   Potassium 4.6 4.0   Chloride 101 101   Calcium 9.6 10.2   Total Protein  7.8   Albumin 4.90 5.2 (A)   Globulin  2.6   Total Bilirubin 1.6 (A) 1.5 (A)   Alkaline Phosphatase 56 74   AST (SGOT) 18 18   ALT (SGPT) 14 11   (A) Abnormal value            CBC    CBC 10/21/21 2/26/22 5/10/22   WBC 6.17 5.80 7.6   RBC 4.49 4.81 4.78   Hemoglobin 14.1 15.0 14.5   Hematocrit 40.7 43.6 43.8   MCV 90.6 90.6 92   MCH 31.4 31.2 30.3   MCHC 34.6 34.4 33.1   RDW 12.1 (A) 11.9 (A) 12.3   Platelets 271 264 297   (A) Abnormal value            TSH    TSH 10/21/21 2/26/22 5/10/22   TSH 2.840 1.710 2.500                     Assessment and Plan    Diagnoses and all orders for this visit:    1. Hot flashes (Primary)  -     CBC & Differential  -     Comprehensive Metabolic Panel  -     TSH  -     XR Chest PA & Lateral    2. Adult onset hypothyroidism  -     TSH    3. Urinary symptom or sign  -     POCT urinalysis  dipstick, automated    Plan:  1.   Hot flashes we will obtain labs, chest x-ray  2. hypothyroidism: will obtain tsh , and continue levothyroxine  3.  Urinary symptoms: Burning sensation, in office UA done, oral hydration       I spent 30 minutes caring for Lucilel on this date of service. This time includes time spent by me in the following activities:preparing for the visit, reviewing tests, performing a medically appropriate examination and/or evaluation , counseling and educating the patient/family/caregiver, ordering medications, tests, or procedures and documenting information in the medical record  Follow Up    Patient was given instructions and counseling regarding her condition or for health maintenance advice. Please see specific information pulled into the AVS if appropriate.

## 2022-05-11 LAB
ALBUMIN SERPL-MCNC: 5.2 G/DL (ref 3.8–4.8)
ALBUMIN/GLOB SERPL: 2 {RATIO} (ref 1.2–2.2)
ALP SERPL-CCNC: 74 IU/L (ref 44–121)
ALT SERPL-CCNC: 11 IU/L (ref 0–32)
AST SERPL-CCNC: 18 IU/L (ref 0–40)
BASOPHILS # BLD AUTO: 0.1 X10E3/UL (ref 0–0.2)
BASOPHILS NFR BLD AUTO: 1 %
BILIRUB SERPL-MCNC: 1.5 MG/DL (ref 0–1.2)
BUN SERPL-MCNC: 13 MG/DL (ref 6–24)
BUN/CREAT SERPL: 15 (ref 9–23)
CALCIUM SERPL-MCNC: 10.2 MG/DL (ref 8.7–10.2)
CHLORIDE SERPL-SCNC: 101 MMOL/L (ref 96–106)
CO2 SERPL-SCNC: 22 MMOL/L (ref 20–29)
CREAT SERPL-MCNC: 0.89 MG/DL (ref 0.57–1)
EGFRCR SERPLBLD CKD-EPI 2021: 81 ML/MIN/1.73
EOSINOPHIL # BLD AUTO: 0.3 X10E3/UL (ref 0–0.4)
EOSINOPHIL NFR BLD AUTO: 4 %
ERYTHROCYTE [DISTWIDTH] IN BLOOD BY AUTOMATED COUNT: 12.3 % (ref 11.7–15.4)
GLOBULIN SER CALC-MCNC: 2.6 G/DL (ref 1.5–4.5)
GLUCOSE SERPL-MCNC: 86 MG/DL (ref 65–99)
HCT VFR BLD AUTO: 43.8 % (ref 34–46.6)
HGB BLD-MCNC: 14.5 G/DL (ref 11.1–15.9)
IMM GRANULOCYTES # BLD AUTO: 0 X10E3/UL (ref 0–0.1)
IMM GRANULOCYTES NFR BLD AUTO: 0 %
LYMPHOCYTES # BLD AUTO: 2.4 X10E3/UL (ref 0.7–3.1)
LYMPHOCYTES NFR BLD AUTO: 31 %
MCH RBC QN AUTO: 30.3 PG (ref 26.6–33)
MCHC RBC AUTO-ENTMCNC: 33.1 G/DL (ref 31.5–35.7)
MCV RBC AUTO: 92 FL (ref 79–97)
MONOCYTES # BLD AUTO: 0.5 X10E3/UL (ref 0.1–0.9)
MONOCYTES NFR BLD AUTO: 6 %
NEUTROPHILS # BLD AUTO: 4.4 X10E3/UL (ref 1.4–7)
NEUTROPHILS NFR BLD AUTO: 58 %
PLATELET # BLD AUTO: 297 X10E3/UL (ref 150–450)
POTASSIUM SERPL-SCNC: 4 MMOL/L (ref 3.5–5.2)
PROT SERPL-MCNC: 7.8 G/DL (ref 6–8.5)
RBC # BLD AUTO: 4.78 X10E6/UL (ref 3.77–5.28)
SODIUM SERPL-SCNC: 140 MMOL/L (ref 134–144)
TSH SERPL DL<=0.005 MIU/L-ACNC: 2.5 UIU/ML (ref 0.45–4.5)
WBC # BLD AUTO: 7.6 X10E3/UL (ref 3.4–10.8)

## 2022-05-23 ENCOUNTER — TRANSCRIBE ORDERS (OUTPATIENT)
Dept: ADMINISTRATIVE | Facility: HOSPITAL | Age: 47
End: 2022-05-23

## 2022-05-23 DIAGNOSIS — Z12.31 VISIT FOR SCREENING MAMMOGRAM: Primary | ICD-10-CM

## 2022-06-09 ENCOUNTER — HOSPITAL ENCOUNTER (EMERGENCY)
Facility: HOSPITAL | Age: 47
Discharge: HOME | End: 2022-06-10
Attending: EMERGENCY MEDICINE | Admitting: EMERGENCY MEDICINE

## 2022-06-09 ENCOUNTER — NURSE TRIAGE (OUTPATIENT)
Dept: CALL CENTER | Facility: HOSPITAL | Age: 47
End: 2022-06-09

## 2022-06-09 ENCOUNTER — APPOINTMENT (OUTPATIENT)
Dept: GENERAL RADIOLOGY | Facility: HOSPITAL | Age: 47
End: 2022-06-09

## 2022-06-09 DIAGNOSIS — S43.402A SPRAIN OF LEFT SHOULDER, UNSPECIFIED SHOULDER SPRAIN TYPE, INITIAL ENCOUNTER: Primary | ICD-10-CM

## 2022-06-09 PROCEDURE — 99282 EMERGENCY DEPT VISIT SF MDM: CPT

## 2022-06-09 PROCEDURE — 73030 X-RAY EXAM OF SHOULDER: CPT

## 2022-06-09 NOTE — TELEPHONE ENCOUNTER
"Fell on Saturday while cutting trees. Fell over a tree truck. Fell forward. Pain left shoulder blade. Left rib area. Going to the ED    Reason for Disposition  • Sounds like a serious injury to the triager    Additional Information  • Negative: Major injury from dangerous force (e.g., fall > 10 feet or 3 meters)  • Negative: [1] Major bleeding (e.g., actively dripping or spurting) AND [2] can't be stopped  • Negative: Shock suspected (e.g., cold/pale/clammy skin, too weak to stand)  • Negative: Difficult to awaken or acting confused (e.g., disoriented, slurred speech)  • Negative: [1] SEVERE weakness (i.e., unable to walk or barely able to walk, requires support) AND [2] new-onset or worsening  • Negative: [1] Can't stand (bear weight) or walk AND [2] new-onset after fall  • Negative: Sounds like a life-threatening emergency to the triager  • Negative: Fainted (passed out)  • Negative: New-onset or worsening weakness of the face, arm or leg on one side of the body  • Negative: [1] New-onset or worsening dizziness AND [2] described as spinning or off balance (i.e., vertigo)  • Negative: [1] New-onset or worsening dizziness AND [2] NO spinning sensation or trouble with balance  • Negative: New-onset or worsening pale skin (pallor)  • Negative: Pregnant and fall  • Negative: Patient has a concerning injury to a specific part of the body (e.g., chest, leg, head)  • Negative: Patient has a wound (abrasion, cut, puncture, other skin injury or tear)  • Negative: Injury (or injuries) that need emergency care  • Negative: [1] Muscle pain AND [2] dark (cola colored) or red-colored urine    Answer Assessment - Initial Assessment Questions  1. MECHANISM: \"How did the fall happen?\"      Fell over a tree   2. DOMESTIC VIOLENCE AND ELDER ABUSE SCREENING: \"Did you fall because someone pushed you or tried to hurt you?\" If Yes, ask: \"Are you safe now?\"    na*  3. ONSET: \"When did the fall happen?\" (e.g., minutes, hours, or days " "ago)  Saturday  4. LOCATION: \"What part of the body hit the ground?\" (e.g., back, buttocks, head, hips, knees, hands, head, stomach)  Ribs  And shoulder  5. INJURY: \"Did you hurt (injure) yourself when you fell?\" If Yes, ask: \"What did you injure? Tell me more about this?\" (e.g., body area; type of injury; pain severity)\"    yes  6. PAIN: \"Is there any pain?\" If Yes, ask: \"How bad is the pain?\" (e.g., Scale 1-10; or mild,   moderate, severe)    - NONE (0): no pain    - MILD (1-3): doesn't interfere with normal activities     - MODERATE (4-7): interferes with normal activities or awakens from sleep     - SEVERE (8-10): excruciating pain, unable to do any normal activities   na  7. SIZE: For cuts, bruises, or swelling, ask: \"How large is it?\" (e.g., inches or centimeters)     na  8. PREGNANCY: \"Is there any chance you are pregnant?\" \"When was your last menstrual period?\"      na  9. OTHER SYMPTOMS: \"Do you have any other symptoms?\" (e.g., dizziness, fever, weakness; new onset or worsening).    more discomfoert  10. CAUSE: \"What do you think caused the fall (or falling)?\" (e.g., tripped, dizzy spell)        tripped    Protocols used: FALLS AND FALLING-ADULT-AH      "

## 2022-06-10 VITALS
RESPIRATION RATE: 16 BRPM | DIASTOLIC BLOOD PRESSURE: 74 MMHG | BODY MASS INDEX: 24.03 KG/M2 | HEIGHT: 65 IN | WEIGHT: 144.2 LBS | TEMPERATURE: 99.6 F | SYSTOLIC BLOOD PRESSURE: 111 MMHG | OXYGEN SATURATION: 100 % | HEART RATE: 88 BPM

## 2022-06-14 ENCOUNTER — OFFICE VISIT (OUTPATIENT)
Dept: INTERNAL MEDICINE | Facility: CLINIC | Age: 47
End: 2022-06-14

## 2022-06-14 VITALS
OXYGEN SATURATION: 98 % | HEART RATE: 72 BPM | HEIGHT: 65 IN | TEMPERATURE: 97.3 F | WEIGHT: 138 LBS | BODY MASS INDEX: 22.99 KG/M2 | RESPIRATION RATE: 16 BRPM | SYSTOLIC BLOOD PRESSURE: 120 MMHG | DIASTOLIC BLOOD PRESSURE: 84 MMHG

## 2022-06-14 DIAGNOSIS — H66.003 ACUTE SUPPURATIVE OTITIS MEDIA OF BOTH EARS WITHOUT SPONTANEOUS RUPTURE OF TYMPANIC MEMBRANES, RECURRENCE NOT SPECIFIED: ICD-10-CM

## 2022-06-14 DIAGNOSIS — R05.9 COUGH: Primary | ICD-10-CM

## 2022-06-14 DIAGNOSIS — J01.01 ACUTE RECURRENT MAXILLARY SINUSITIS: ICD-10-CM

## 2022-06-14 DIAGNOSIS — J06.0 ACUTE LARYNGOPHARYNGITIS: ICD-10-CM

## 2022-06-14 LAB
EXPIRATION DATE: NORMAL
EXPIRATION DATE: NORMAL
FLUAV AG UPPER RESP QL IA.RAPID: NOT DETECTED
FLUBV AG UPPER RESP QL IA.RAPID: NOT DETECTED
INTERNAL CONTROL: NORMAL
INTERNAL CONTROL: NORMAL
Lab: NORMAL
Lab: NORMAL
S PYO AG THROAT QL: NEGATIVE
SARS-COV-2 AG UPPER RESP QL IA.RAPID: NOT DETECTED

## 2022-06-14 PROCEDURE — 99214 OFFICE O/P EST MOD 30 MIN: CPT | Performed by: INTERNAL MEDICINE

## 2022-06-14 PROCEDURE — 87428 SARSCOV & INF VIR A&B AG IA: CPT | Performed by: INTERNAL MEDICINE

## 2022-06-14 PROCEDURE — 87880 STREP A ASSAY W/OPTIC: CPT | Performed by: INTERNAL MEDICINE

## 2022-06-14 RX ORDER — CEPHALEXIN 500 MG/1
500 CAPSULE ORAL 3 TIMES DAILY
Qty: 30 CAPSULE | Refills: 0 | Status: SHIPPED | OUTPATIENT
Start: 2022-06-14 | End: 2022-06-24

## 2022-06-14 NOTE — PROGRESS NOTES
"Chief Complaint  Cough (Fever last Wednesday , for 24hrs), Nasal Congestion, and Sore Throat    Subjective        Lucille Linder presents to [facility]  History of Present Illness  HPI: Patient is here complaining of a fever last week which lasted for 24 hours, the patient also complains of ear pain , nasal discharge , sore throat  And sinus congestion since the past few days, patient has been trying over the counter medications with not much relief in the symptoms , patient also complains of cough which is a dry cough, she states her sore throat has been worsening, she states her children were sick at home last week  Objective   Vital Signs:  /84   Pulse 72   Temp 97.3 °F (36.3 °C)   Resp 16   Ht 165.1 cm (65\")   Wt 62.6 kg (138 lb)   SpO2 98%   BMI 22.96 kg/m²   Estimated body mass index is 22.96 kg/m² as calculated from the following:    Height as of this encounter: 165.1 cm (65\").    Weight as of this encounter: 62.6 kg (138 lb).    BMI is within normal parameters. No other follow-up for BMI required.      Physical Exam  Vitals and nursing note reviewed.   Constitutional:       General: She is not in acute distress.     Appearance: Normal appearance. She is not diaphoretic.   HENT:      Head: Normocephalic and atraumatic.      Right Ear: External ear normal. Tympanic membrane is erythematous.      Left Ear: External ear normal. Tympanic membrane is erythematous.      Nose: Rhinorrhea present.      Right Sinus: Maxillary sinus tenderness present.      Left Sinus: Maxillary sinus tenderness present.   Eyes:      Extraocular Movements: Extraocular movements intact.      Conjunctiva/sclera: Conjunctivae normal.   Neck:      Trachea: Trachea normal.   Cardiovascular:      Rate and Rhythm: Normal rate and regular rhythm.      Heart sounds: Normal heart sounds.   Pulmonary:      Effort: Pulmonary effort is normal. No respiratory distress.      Breath sounds: Normal " breath sounds.   Abdominal:      General: Abdomen is flat.   Musculoskeletal:      Cervical back: Neck supple.      Comments: Moves all limbs   Skin:     General: Skin is warm.   Neurological:      Mental Status: She is alert and oriented to person, place, and time.      Comments: No gross motor or sensory deficits        Result Review :{Labs  Result Review  Imaging  Med Tab  Media  Procedures  :23}    Strep    Common Labsle 6/14/22   POC Strep A, Molecular Negative                     Assessment and Plan   Diagnoses and all orders for this visit:    1. Cough (Primary)  -     POCT SARS-CoV-2 Antigen DAVID    2. Acute laryngopharyngitis  -     POCT rapid strep A  -     cephalexin (KEFLEX) 500 MG capsule; Take 1 capsule by mouth 3 (Three) Times a Day for 10 days.  Dispense: 30 capsule; Refill: 0    3. Acute recurrent maxillary sinusitis  -     cephalexin (KEFLEX) 500 MG capsule; Take 1 capsule by mouth 3 (Three) Times a Day for 10 days.  Dispense: 30 capsule; Refill: 0    4. Acute suppurative otitis media of both ears without spontaneous rupture of tympanic membranes, recurrence not specified  -     cephalexin (KEFLEX) 500 MG capsule; Take 1 capsule by mouth 3 (Three) Times a Day for 10 days.  Dispense: 30 capsule; Refill: 0     Plan :   1.cough: In office flu and COVID test negative  2.Acute suppurative otitis media: will   start oral antibiotics    3.Acute maxillary sinusitis:   will   start oral antibiotics   4. Acute laryngopharyngitis:  will   Start  oral antibiotics, in office strep test negative         I spent 30 minutes caring for Lucille on this date of service. This time includes time spent by me in the following activities:preparing for the visit, reviewing tests, performing a medically appropriate examination and/or evaluation , counseling and educating the patient/family/caregiver, ordering medications, tests, or procedures and documenting information in the medical record  Follow Up    Patient was  given instructions and counseling regarding her condition or for health maintenance advice. Please see specific information pulled into the AVS if appropriate.

## 2022-06-22 NOTE — ED PROVIDER NOTES
Subjective   History of Present Illness    Chief Complaint: Shoulder pain  History of Present Illness: 46-year-old female was doing some yard work, tripped and fell on a tree.  No punctures no lacerations no weakness or deformity  Onset: Before arrival  Duration: Single episode persistent symptoms  Exacerbating / Alleviating factors: Worse with range of motion  Associated symptoms: None      Nurses Notes reviewed and agree, including vitals, allergies, social history and prior medical history.     REVIEW OF SYSTEMS: All systems reviewed and not pertinent unless noted.    Positive for: Left shoulder pain radiating into the back    Negative for: Weakness numbness punctures laceration deformity  Review of Systems    Past Medical History:   Diagnosis Date   • Allergic    • B12 deficiency 2015   • Encounter for insertion of ParaGard IUD 03/2013   • Headache    • Hyperlipidemia    • Hypothyroidism 2015   • Migraine    • Ovarian cyst    • Restless leg syndrome 1995   • Snores    • Urinary tract infection    • Varicella        No Known Allergies    Past Surgical History:   Procedure Laterality Date   • COLONOSCOPY N/A 04/04/2017    Procedure: COLONOSCOPY WITH ANOSCOPY;  Surgeon: Cezar Greenberg MD;  Location: [facility] ENDOSCOPY;  Service:    • WISDOM TOOTH EXTRACTION         Family History   Problem Relation Age of Onset   • Lung cancer Father    • Other Other         arteriosclerotic cardiovascular disease, cerebrovascular accident   • Breast cancer Other    • Breast cancer Paternal Grandmother    • Thyroid disease Sister    • Melanoma Sister    • Colon cancer Neg Hx        Social History     Socioeconomic History   • Marital status:    Tobacco Use   • Smoking status: Never Smoker   • Smokeless tobacco: Never Used   Vaping Use   • Vaping Use: Never used   Substance and Sexual Activity   • Alcohol use: Yes     Alcohol/week: 9.0 standard drinks     Types: 2 Glasses of wine, 4 Cans of beer, 3 Shots of liquor per week      "Comment: OCCASSIONALLY-1 DRINK PER DAY.  USUALLY BEER   • Drug use: No   • Sexual activity: Yes     Partners: Male     Birth control/protection: I.U.D.           Objective   Physical Exam  /74 (BP Location: Right arm, Patient Position: Sitting)   Pulse 88   Temp 99.6 °F (37.6 °C) (Oral)   Resp 16   Ht 165.1 cm (65\")   Wt 65.4 kg (144 lb 3.2 oz)   SpO2 100%   BMI 24.00 kg/m²       CONSTITUTIONAL: Well developed, nontoxic healthy-appearing 46-year-old  female,  in no acute distress.  VITAL SIGNS: per nursing, reviewed and noted  SKIN: exposed skin with no rashes, ulcerations or petechiae  EYES: Grossly EOMI, no icterus  ENT: Normal voice.  Patient maintained wearing a mask throughout patient encounter due to coronavirus pandemic  RESPIRATORY:  No increased work of breathing. No retractions.  Chest clear auscultation bilaterally  CARDIOVASCULAR: Good peripheral pulses. Good cap refill to extremities.   GI: Abdomen soft, nontender, normal bowel sounds. No hernia. No ascites.  MUSCULOSKELETAL: Tenderness palpation of the left shoulder diffusely.  Full range of motion.  Mild scapular area spasm.  No midline lumbar or thoracic tenderness.  Full range of motion of the neck.    NEUROLOGIC: Alert, oriented x 3. No gross deficits. GCS 15.   PSYCH: appropriate affect.  : no bladder tenderness or distention, no CVA tenderness      Procedures     None      ED Course      Left shoulder x-ray interpreted by me reveals no fractures no dislocation                                     MDM  Patient presents for evaluation of left shoulder pain status post fall.  No acute findings on plain films per my interpretation.  Neurovascularly intact.  Discharged home with supportive care recommendations outpatient follow return precautions discussed.  Final diagnoses:   Sprain of left shoulder, unspecified shoulder sprain type, initial encounter       ED Disposition  ED Disposition     ED Disposition   Discharge    " Condition   Stable    Comment   --             Paty Reinoso MD  [address]  [facility] [address] [phone]          [facility] Emergency Department  [address] [address] [phone]    As needed, If symptoms worsen         Medication List      No changes were made to your prescriptions during this visit.          Lawson Gill, DO  06/21/22 1532

## 2022-07-28 ENCOUNTER — OFFICE VISIT (OUTPATIENT)
Dept: INTERNAL MEDICINE | Facility: CLINIC | Age: 47
End: 2022-07-28

## 2022-07-28 VITALS
WEIGHT: 142 LBS | TEMPERATURE: 97.8 F | HEART RATE: 76 BPM | HEIGHT: 65 IN | BODY MASS INDEX: 23.66 KG/M2 | DIASTOLIC BLOOD PRESSURE: 69 MMHG | RESPIRATION RATE: 16 BRPM | OXYGEN SATURATION: 98 % | SYSTOLIC BLOOD PRESSURE: 104 MMHG

## 2022-07-28 DIAGNOSIS — E03.8 ADULT ONSET HYPOTHYROIDISM: Primary | ICD-10-CM

## 2022-07-28 PROCEDURE — 99213 OFFICE O/P EST LOW 20 MIN: CPT | Performed by: INTERNAL MEDICINE

## 2022-07-28 RX ORDER — LEVOTHYROXINE SODIUM 0.03 MG/1
25 TABLET ORAL DAILY
Qty: 90 TABLET | Refills: 2 | Status: SHIPPED | OUTPATIENT
Start: 2022-07-28

## 2022-07-29 ENCOUNTER — HOSPITAL ENCOUNTER (OUTPATIENT)
Dept: MAMMOGRAPHY | Facility: HOSPITAL | Age: 47
Discharge: HOME | End: 2022-07-29
Admitting: OBSTETRICS & GYNECOLOGY

## 2022-07-29 DIAGNOSIS — Z12.31 VISIT FOR SCREENING MAMMOGRAM: ICD-10-CM

## 2022-07-29 PROCEDURE — 77063 BREAST TOMOSYNTHESIS BI: CPT

## 2022-07-29 PROCEDURE — 77067 SCR MAMMO BI INCL CAD: CPT

## 2022-08-02 ENCOUNTER — CLINICAL SUPPORT (OUTPATIENT)
Dept: INTERNAL MEDICINE | Facility: CLINIC | Age: 47
End: 2022-08-02

## 2022-08-02 DIAGNOSIS — Z23 NEED FOR VACCINATION: Primary | ICD-10-CM

## 2022-08-02 PROCEDURE — 0054A COVID-19 (PFIZER) 12+ YRS: CPT | Performed by: INTERNAL MEDICINE

## 2022-08-02 PROCEDURE — 91305 COVID-19 (PFIZER) 12+ YRS: CPT | Performed by: INTERNAL MEDICINE

## 2022-10-06 ENCOUNTER — OFFICE VISIT (OUTPATIENT)
Dept: INTERNAL MEDICINE | Facility: CLINIC | Age: 47
End: 2022-10-06

## 2022-10-06 VITALS
HEART RATE: 80 BPM | OXYGEN SATURATION: 98 % | DIASTOLIC BLOOD PRESSURE: 70 MMHG | WEIGHT: 141 LBS | TEMPERATURE: 98.4 F | HEIGHT: 65 IN | BODY MASS INDEX: 23.49 KG/M2 | SYSTOLIC BLOOD PRESSURE: 128 MMHG

## 2022-10-06 DIAGNOSIS — J06.9 ACUTE URI: Primary | ICD-10-CM

## 2022-10-06 LAB
EXPIRATION DATE: NORMAL
INTERNAL CONTROL: NORMAL
Lab: NORMAL
S PYO AG THROAT QL: NEGATIVE

## 2022-10-06 PROCEDURE — 99213 OFFICE O/P EST LOW 20 MIN: CPT | Performed by: NURSE PRACTITIONER

## 2022-10-06 PROCEDURE — 87880 STREP A ASSAY W/OPTIC: CPT | Performed by: NURSE PRACTITIONER

## 2022-10-06 NOTE — PROGRESS NOTES
"  Office Visit      Patient Name: Lucille Linder  : 1975   MRN: [id number]   Care Team: Patient Care Team:  Paty Reinoso MD as PCP - General (Internal Medicine)    Chief Complaint  Ear Fullness (Right ear stuffy / slight pain x one week. Negative home covid test last night ) and Sore Throat (X one week )    Subjective     Subjective      Lucille Linder presents to [facility] for ear fullness and sore throat.   Symptoms started 1 week ago.   Endorses sore throat, ear fullness of right ear, and fatigue.   Denies fever, chills, cough, nausea, vomiting, diarrhea, nasal congestion, sneezing, rhinorrhea, body aches, headache, and sinus pressure.   She is a .   No known exposure to COVID-19 that she is aware of, took home COVID test today and negative. She has been vaccinated for COVID-19.   She does not get frequent strep throat.   Has not tried anything for the symptoms.     Review of Systems   Constitutional: Positive for fatigue. Negative for chills and fever.   HENT: Positive for ear pain and sore throat. Negative for congestion, postnasal drip, sinus pressure, sneezing, swollen glands and trouble swallowing.    Respiratory: Negative for cough, shortness of breath and wheezing.    Cardiovascular: Negative for chest pain.   Gastrointestinal: Negative for abdominal pain, diarrhea, nausea and vomiting.   Neurological: Negative for headache.   Psychiatric/Behavioral: Negative for sleep disturbance.       Objective     Objective   Vital Signs:   /70   Pulse 80   Temp 98.4 °F (36.9 °C) (Infrared)   Ht 165.1 cm (65\")   Wt 64 kg (141 lb)   SpO2 98%   BMI 23.46 kg/m²     Physical Exam  Vitals and nursing note reviewed.   Constitutional:       General: She is not in acute distress.     Appearance: Normal appearance. She is not ill-appearing or toxic-appearing.   HENT:      Right Ear: Ear canal normal. No middle ear effusion. " Tympanic membrane is bulging. Tympanic membrane is not erythematous.      Left Ear: Tympanic membrane and ear canal normal.  No middle ear effusion. Tympanic membrane is not bulging.      Nose: Nose normal. No congestion or rhinorrhea.      Right Sinus: No maxillary sinus tenderness or frontal sinus tenderness.      Left Sinus: No maxillary sinus tenderness or frontal sinus tenderness.      Mouth/Throat:      Mouth: Mucous membranes are moist.      Pharynx: Posterior oropharyngeal erythema present.   Eyes:      Pupils: Pupils are equal, round, and reactive to light.   Cardiovascular:      Rate and Rhythm: Normal rate and regular rhythm.      Heart sounds: Normal heart sounds. No murmur heard.  Pulmonary:      Effort: Pulmonary effort is normal. No respiratory distress.      Breath sounds: Normal breath sounds. No wheezing.   Abdominal:      General: Bowel sounds are normal. There is no distension.      Palpations: Abdomen is soft.      Tenderness: There is no abdominal tenderness.   Musculoskeletal:      Cervical back: Neck supple. No tenderness.   Lymphadenopathy:      Head:      Right side of head: No submental, submandibular or tonsillar adenopathy.      Left side of head: No submental, submandibular or tonsillar adenopathy.      Cervical: No cervical adenopathy.   Skin:     General: Skin is warm and dry.      Findings: No rash.   Neurological:      Mental Status: She is alert.   Psychiatric:         Mood and Affect: Mood normal.         Behavior: Behavior normal.          Assessment / Plan      Assessment & Plan   Problem List Items Addressed This Visit    None     Visit Diagnoses     Acute URI    -  Primary    Relevant Orders    POCT rapid strep A    Strep negative, discussed likely viral in nature and self limiting. Recommend fluticasone nasal spray, rest, push fluids, and can use OTC mucinex if congestion develops. Advised to call clinic or be re-evaluated with worsening.          Follow Up   Return if  symptoms worsen or fail to improve.  Patient was given instructions and counseling regarding her condition or for health maintenance advice. Please see specific information pulled into the AVS if appropriate.     YARI Faulkner  [facility]

## 2022-11-01 ENCOUNTER — TELEPHONE (OUTPATIENT)
Dept: INTERNAL MEDICINE | Facility: CLINIC | Age: 47
End: 2022-11-01

## 2022-11-01 NOTE — TELEPHONE ENCOUNTER
Caller: Lucille Linder    Relationship: Self    Best call back number: [phone]    What is the best time to reach you: ANYTIME     Who are you requesting to speak with (clinical staff, provider,  specific staff member): CLINICAL     Do you know the name of the person who called: LUCILLE     What was the call regarding: THE PATIENT REPORTS THAT DR REILLY HAS BEEN CHECKING HER THYROID EVERY FEW MONTHS AND IS REQUESTING A CALL BACK TO SEE WHEN SHE NEEDS TO COME IN, AND IF THERE ARE LAB ORDERS IN THE SYSTEM TO CHECK HER THYROID.     Do you require a callback: YES, PLEASE CALL AND ADVISE

## 2022-11-02 NOTE — TELEPHONE ENCOUNTER
Detailed information relayed to pt, thyroid RX good for one year come to March appointment she verbalized understanding

## 2022-11-05 ENCOUNTER — OFFICE VISIT (OUTPATIENT)
Dept: INTERNAL MEDICINE | Facility: CLINIC | Age: 47
End: 2022-11-05

## 2022-11-05 VITALS
RESPIRATION RATE: 16 BRPM | BODY MASS INDEX: 23.32 KG/M2 | DIASTOLIC BLOOD PRESSURE: 63 MMHG | WEIGHT: 140 LBS | HEART RATE: 66 BPM | TEMPERATURE: 97.5 F | SYSTOLIC BLOOD PRESSURE: 112 MMHG | HEIGHT: 65 IN | OXYGEN SATURATION: 100 %

## 2022-11-05 DIAGNOSIS — J06.9 VIRAL URI: Primary | ICD-10-CM

## 2022-11-05 LAB
EXPIRATION DATE: NORMAL
EXPIRATION DATE: NORMAL
FLUAV RNA RESP QL NAA+PROBE: NEGATIVE
FLUBV RNA RESP QL NAA+PROBE: NEGATIVE
INTERNAL CONTROL: NORMAL
INTERNAL CONTROL: NORMAL
Lab: NORMAL
Lab: NORMAL
S PYO AG THROAT QL: NEGATIVE

## 2022-11-05 PROCEDURE — 87880 STREP A ASSAY W/OPTIC: CPT | Performed by: NURSE PRACTITIONER

## 2022-11-05 PROCEDURE — 87502 INFLUENZA DNA AMP PROBE: CPT | Performed by: NURSE PRACTITIONER

## 2022-11-05 PROCEDURE — 99213 OFFICE O/P EST LOW 20 MIN: CPT | Performed by: NURSE PRACTITIONER

## 2022-11-05 NOTE — PROGRESS NOTES
Chief Complaint / Reason:      Chief Complaint   Patient presents with   • Sore Throat     Son has flu, daughter has strep       Subjective     HPI  Patient presents today with complaints of sore throat and states that it has cleared and daughter has strep she denies chest pain, shortness of breath or heart palpitations.  Her vital signs are stable she has tried nonpharmacological interventions with minimal relief.  History taken from: patient    PMH/FH/Social History were reviewed and updated appropriately in the electronic medical record.   Past Medical History:   Diagnosis Date   • Allergic    • B12 deficiency 2015   • Encounter for insertion of ParaGard IUD 03/2013   • Headache    • Hyperlipidemia    • Hypothyroidism 2015   • Migraine    • Ovarian cyst    • Restless leg syndrome 1995   • Snores    • Urinary tract infection    • Varicella      Past Surgical History:   Procedure Laterality Date   • COLONOSCOPY N/A 04/04/2017    Procedure: COLONOSCOPY WITH ANOSCOPY;  Surgeon: Cezar Greenberg MD;  Location: [facility] ENDOSCOPY;  Service:    • WISDOM TOOTH EXTRACTION       Social History     Socioeconomic History   • Marital status:    Tobacco Use   • Smoking status: Never   • Smokeless tobacco: Never   Vaping Use   • Vaping Use: Never used   Substance and Sexual Activity   • Alcohol use: Yes     Alcohol/week: 9.0 standard drinks     Types: 2 Glasses of wine, 4 Cans of beer, 3 Shots of liquor per week     Comment: OCCASSIONALLY-1 DRINK PER DAY.  USUALLY BEER   • Drug use: No   • Sexual activity: Yes     Partners: Male     Birth control/protection: I.U.D.     Family History   Problem Relation Age of Onset   • Lung cancer Father    • Other Other         arteriosclerotic cardiovascular disease, cerebrovascular accident   • Breast cancer Other    • Breast cancer Paternal Grandmother    • Thyroid disease Sister    • Melanoma Sister    • Colon cancer Neg Hx        Review of Systems:   Review of Systems      All other  systems were reviewed and are negative.  Exceptions are noted in the subjective or above.      Objective     Vital Signs  Vitals:    11/05/22 1447   BP: 112/63   Pulse: 66   Resp: 16   Temp: 97.5 °F (36.4 °C)   SpO2: 100%       Body mass index is 23.3 kg/m².    Physical Exam         Results Review:    I reviewed the patient's new clinical results.   Office Visit on 11/05/2022   Component Date Value Ref Range Status   • Rapid Strep A Screen 11/05/2022 Negative  Negative, VALID, INVALID, Not Performed Final   • Internal Control 11/05/2022 Passed  Passed Final   • Lot Number 11/05/2022 [id number]   Final   • Expiration Date 11/05/2022 07/31/2023   Final   • POC Influenza A, Molecular 11/05/2022 Negative  Negative Final   • POC Influenza B, Molecular 11/05/2022 Negative  Negative Final   • Internal Control 11/05/2022 Passed  Passed Final   • Lot Number 11/05/2022 [id number]   Final   • Expiration Date 11/05/2022 03/31/2023   Final         Medication Review:     Current Outpatient Medications:   •  B Complex-C (SUPER B COMPLEX PO), Take  by mouth., Disp: , Rfl:   •  CRANBERRY JUICE EXTRACT PO, Take  by mouth Daily., Disp: , Rfl:   •  levothyroxine (SYNTHROID, LEVOTHROID) 25 MCG tablet, Take 1 tablet by mouth Daily. 200001, Disp: 90 tablet, Rfl: 2  •  Probiotic Product (PROBIOTIC DAILY PO), Take  by mouth., Disp: , Rfl:   •  vitamin E 400 UNIT capsule, Take 400 Units by mouth Daily., Disp: , Rfl:   •  cyclobenzaprine (FLEXERIL) 10 MG tablet, Take 1 tablet by mouth At Night As Needed for Muscle Spasms., Disp: 30 tablet, Rfl: 2  •  levonorgestrel (Mirena, 52 MG,) 20 MCG/24HR IUD, Take to prescriber's office, Disp: 1 each, Rfl: 0    Diagnoses and all orders for this visit:    Viral URI  -     POCT rapid strep A  -     POCT Flu A&B, Molecular      Advised patient to treat symptomatically and contact office for worsening signs and symptoms.    Return if symptoms worsen or fail to improve.    Keesha Max,  APRN  11/05/2022

## 2022-11-22 ENCOUNTER — OFFICE VISIT (OUTPATIENT)
Dept: OBSTETRICS AND GYNECOLOGY | Facility: CLINIC | Age: 47
End: 2022-11-22

## 2022-11-22 VITALS
BODY MASS INDEX: 23.26 KG/M2 | DIASTOLIC BLOOD PRESSURE: 78 MMHG | WEIGHT: 139.6 LBS | HEIGHT: 65 IN | SYSTOLIC BLOOD PRESSURE: 120 MMHG

## 2022-11-22 DIAGNOSIS — N89.8 VAGINAL ITCHING: ICD-10-CM

## 2022-11-22 DIAGNOSIS — N89.8 VAGINAL ODOR: Primary | ICD-10-CM

## 2022-11-22 PROCEDURE — 99212 OFFICE O/P EST SF 10 MIN: CPT | Performed by: PHYSICIAN ASSISTANT

## 2022-11-22 NOTE — PROGRESS NOTES
Subjective   Chief Complaint   Patient presents with   • vaginal odor     Patient is here today C/O vaginal odor and vaginal itching       Lucille Linder is a 47 y.o. year old  presenting to be seen for complaint of vaginal odor and mild itching that occurs after intercourse only. Has been going on for a few months. No discharge.  Odor lasts anywhere for 1-2 days then resolves on its own  Has IUD and no bleeds with IUD    Past Medical History:   Diagnosis Date   • Allergic    • B12 deficiency    • Encounter for insertion of ParaGard IUD 2013   • Headache    • Hyperlipidemia    • Hypothyroidism    • Migraine    • Ovarian cyst    • Restless leg syndrome    • Snores    • Urinary tract infection    • Varicella         Current Outpatient Medications:   •  B Complex-C (SUPER B COMPLEX PO), Take  by mouth., Disp: , Rfl:   •  CRANBERRY JUICE EXTRACT PO, Take  by mouth Daily., Disp: , Rfl:   •  levothyroxine (SYNTHROID, LEVOTHROID) 25 MCG tablet, Take 1 tablet by mouth Daily. , Disp: 90 tablet, Rfl: 2  •  Probiotic Product (PROBIOTIC DAILY PO), Take  by mouth., Disp: , Rfl:   •  vitamin E 400 UNIT capsule, Take 400 Units by mouth Daily., Disp: , Rfl:   •  cyclobenzaprine (FLEXERIL) 10 MG tablet, Take 1 tablet by mouth At Night As Needed for Muscle Spasms., Disp: 30 tablet, Rfl: 2  •  levonorgestrel (Mirena, 52 MG,) 20 MCG/24HR IUD, Take to prescriber's office, Disp: 1 each, Rfl: 0   No Known Allergies   Past Surgical History:   Procedure Laterality Date   • COLONOSCOPY N/A 2017    Procedure: COLONOSCOPY WITH ANOSCOPY;  Surgeon: Cezar Greenberg MD;  Location: [facility] ENDOSCOPY;  Service:    • WISDOM TOOTH EXTRACTION        Social History     Socioeconomic History   • Marital status:    Tobacco Use   • Smoking status: Never   • Smokeless tobacco: Never   Vaping Use   • Vaping Use: Never used   Substance and Sexual Activity   • Alcohol use: Yes     Alcohol/week: 9.0 standard  "drinks     Types: 2 Glasses of wine, 4 Cans of beer, 3 Shots of liquor per week     Comment: OCCASSIONALLY-1 DRINK PER DAY.  USUALLY BEER   • Drug use: No   • Sexual activity: Yes     Partners: Male     Birth control/protection: I.U.D.      Family History   Problem Relation Age of Onset   • Lung cancer Father    • Other Other         arteriosclerotic cardiovascular disease, cerebrovascular accident   • Breast cancer Other    • Breast cancer Paternal Grandmother    • Thyroid disease Sister    • Melanoma Sister    • Colon cancer Neg Hx        Review of Systems   Constitutional: Negative for chills, diaphoresis and fever.   Gastrointestinal: Negative.    Genitourinary: Negative for difficulty urinating, dysuria, menstrual problem, pelvic pain and vaginal discharge.           Objective   /78   Ht 165.1 cm (65\")   Wt 63.3 kg (139 lb 9.6 oz)   BMI 23.23 kg/m²     Physical Exam  Constitutional:       Appearance: Normal appearance. She is well-developed and well-groomed.   Eyes:      General: Lids are normal.      Extraocular Movements: Extraocular movements intact.      Conjunctiva/sclera: Conjunctivae normal.   Genitourinary:     Labia:         Right: No rash, tenderness or lesion.         Left: No rash, tenderness or lesion.       Urethra: No prolapse, urethral pain, urethral swelling or urethral lesion.      Vagina: No vaginal discharge, tenderness, bleeding or lesions.      Cervix: No cervical motion tenderness, discharge, friability, lesion, erythema or cervical bleeding.      Comments: IUD strings 3 cm  Skin:     General: Skin is warm and dry.      Findings: No bruising or lesion.   Neurological:      Mental Status: She is alert.   Psychiatric:         Attention and Perception: Attention normal.         Mood and Affect: Mood normal.         Speech: Speech normal.         Behavior: Behavior is cooperative.            Result Review :                   Assessment and Plan  Diagnoses and all orders for this " visit:    1. Vaginal odor (Primary)  -     NuSwab VG+ - Swab, Vagina    2. Vaginal itching  -     NuSwab VG+ - Swab, Vagina      Patient Instructions   No concerning findings on exam  Will contact with results of swab when available               This note was electronically signed.    Radha Pradhan PA-C   November 22, 2022

## 2023-01-22 ENCOUNTER — TELEPHONE (OUTPATIENT)
Dept: INTERNAL MEDICINE | Facility: CLINIC | Age: 48
End: 2023-01-22
Payer: COMMERCIAL

## 2023-01-22 NOTE — TELEPHONE ENCOUNTER
Patient called asking advice for COVID-19. She tested positive this morning but symptoms started Friday (2 days ago). Symptoms include aches, chills, congestion, 1 episode of diarrhea. No fever, SOA, wheezing, or any other acute respiratory symptoms. Discussed that she will need to quarantine 5 days from onset of symptoms and can leave quarantine as long as fever free with symptom improvement.  Use one area of the home. Stay 6 feet away from others in the home. Encouraged Mucinex with lots of water, rest, deep breathing exercises, saline rinses. Tylenol prn for fever, body aches, headaches. Discussed when to be seen urgently. Pt asks about time sensitive medications for covid, she is <50 years of age and no comorbid conditions that would indicate Paxlovid. Pt verbalizes understanding.

## 2023-03-06 ENCOUNTER — OFFICE VISIT (OUTPATIENT)
Dept: INTERNAL MEDICINE | Facility: CLINIC | Age: 48
End: 2023-03-06
Payer: COMMERCIAL

## 2023-03-06 VITALS
TEMPERATURE: 98.2 F | HEART RATE: 75 BPM | SYSTOLIC BLOOD PRESSURE: 106 MMHG | OXYGEN SATURATION: 98 % | DIASTOLIC BLOOD PRESSURE: 73 MMHG | BODY MASS INDEX: 23.16 KG/M2 | WEIGHT: 139 LBS | RESPIRATION RATE: 16 BRPM | HEIGHT: 65 IN

## 2023-03-06 DIAGNOSIS — Z00.00 ROUTINE GENERAL MEDICAL EXAMINATION AT A HEALTH CARE FACILITY: Primary | ICD-10-CM

## 2023-03-06 DIAGNOSIS — Z23 NEED FOR VACCINATION: ICD-10-CM

## 2023-03-06 PROCEDURE — 99396 PREV VISIT EST AGE 40-64: CPT | Performed by: INTERNAL MEDICINE

## 2023-03-06 PROCEDURE — 90471 IMMUNIZATION ADMIN: CPT | Performed by: INTERNAL MEDICINE

## 2023-03-06 PROCEDURE — 90715 TDAP VACCINE 7 YRS/> IM: CPT | Performed by: INTERNAL MEDICINE

## 2023-03-06 RX ORDER — CYCLOBENZAPRINE HCL 10 MG
10 TABLET ORAL NIGHTLY PRN
Qty: 30 TABLET | Refills: 2 | Status: SHIPPED | OUTPATIENT
Start: 2023-03-06

## 2023-03-06 NOTE — PROGRESS NOTES
Subjective   Lucille Linder is a 47 y.o. female.     Chief Complaint   Patient presents with   • Annual Exam       History of Present Illness   Patient is here for an annual exam and due for labs for cholesterol and need tdap    Review of Systems   All other systems reviewed and are negative.        Past Medical History:   Diagnosis Date   • Allergic    • B12 deficiency 2015   • Encounter for insertion of ParaGard IUD 03/2013   • Headache    • Hyperlipidemia    • Hypothyroidism 2015   • Migraine    • Ovarian cyst    • Restless leg syndrome 1995   • Snores    • Urinary tract infection    • Varicella        Past Surgical History:   Procedure Laterality Date   • COLONOSCOPY N/A 04/04/2017    Procedure: COLONOSCOPY WITH ANOSCOPY;  Surgeon: Cezar Greenberg MD;  Location: [facility] ENDOSCOPY;  Service:    • WISDOM TOOTH EXTRACTION         Family History   Problem Relation Age of Onset   • Lung cancer Father    • Other Other         arteriosclerotic cardiovascular disease, cerebrovascular accident   • Breast cancer Other    • Breast cancer Paternal Grandmother    • Thyroid disease Sister    • Melanoma Sister    • Colon cancer Neg Hx         reports that she has never smoked. She has never used smokeless tobacco. She reports current alcohol use of about 9.0 standard drinks per week. She reports that she does not use drugs.    No Known Allergies        Current Outpatient Medications:   •  B Complex-C (SUPER B COMPLEX PO), Take  by mouth., Disp: , Rfl:   •  CRANBERRY JUICE EXTRACT PO, Take  by mouth Daily., Disp: , Rfl:   •  cyclobenzaprine (FLEXERIL) 10 MG tablet, Take 1 tablet by mouth At Night As Needed for Muscle Spasms., Disp: 30 tablet, Rfl: 2  •  levothyroxine (SYNTHROID, LEVOTHROID) 25 MCG tablet, Take 1 tablet by mouth Daily. 200001, Disp: 90 tablet, Rfl: 2  •  Probiotic Product (PROBIOTIC DAILY PO), Take  by mouth., Disp: , Rfl:   •  vitamin E 400 UNIT capsule, Take 1 capsule by mouth Daily., Disp: , Rfl:   •  " levonorgestrel (Mirena, 52 MG,) 20 MCG/24HR IUD, Take to prescriber's office, Disp: 1 each, Rfl: 0      Objective   Blood pressure 106/73, pulse 75, temperature 98.2 °F (36.8 °C), resp. rate 16, height 165.1 cm (65\"), weight 63 kg (139 lb), SpO2 98 %, not currently breastfeeding.    Physical Exam  Vitals and nursing note reviewed.   Constitutional:       General: She is not in acute distress.     Appearance: Normal appearance. She is not diaphoretic.   HENT:      Head: Normocephalic and atraumatic.      Right Ear: External ear normal.      Left Ear: External ear normal.      Nose: Nose normal.   Eyes:      Extraocular Movements: Extraocular movements intact.      Conjunctiva/sclera: Conjunctivae normal.   Neck:      Trachea: Trachea normal.   Cardiovascular:      Rate and Rhythm: Normal rate and regular rhythm.      Heart sounds: Normal heart sounds.   Pulmonary:      Effort: Pulmonary effort is normal. No respiratory distress.      Breath sounds: Normal breath sounds.   Abdominal:      General: Abdomen is flat.   Musculoskeletal:      Cervical back: Neck supple.      Comments: Moves all limbs   Skin:     General: Skin is warm.   Neurological:      Mental Status: She is alert and oriented to person, place, and time.      Comments: No gross motor or sensory deficits         BMI is within normal parameters. No other follow-up for BMI required.      Results for orders placed or performed in visit on 11/22/22   NuSwab VG+ - Swab, Vagina    Specimen: Vagina; Swab    VA   Result Value Ref Range    Atopobium Vaginae Low - 0 Score    BVAB 2 Low - 0 Score    Megasphaera 1 Low - 0 Score    Candida Albicans, SUNI Negative Negative    Candida Glabrata, SUNI Negative Negative    Trichomonas vaginosis Negative Negative    Chlamydia trachomatis, SUNI Negative Negative    Neisseria gonorrhoeae, SUNI Negative Negative         Assessment & Plan   Diagnoses and all orders for this visit:    1. Routine general medical examination at a " health care facility (Primary)  -     CBC & Differential  -     Comprehensive Metabolic Panel  -     Lipid Panel  -     Hemoglobin A1c  -     TSH  -     Vitamin B12    2. Need for vaccination  -     Tdap Vaccine Greater Than or Equal To 8yo IM    Other orders  -     cyclobenzaprine (FLEXERIL) 10 MG tablet; Take 1 tablet by mouth At Night As Needed for Muscle Spasms.  Dispense: 30 tablet; Refill: 2      plan:  1.physical: Physical exam conducted today,  Will obtain fasting lab work,  Tdap given in office today and well-tolerated  Impression: healthy adult Patient. Currently, patient eats a healthy diet. Screening lab work includes glucose, lipid profile , complete blood count and comprehensive panel . The risks and benefits of immunizations were discussed and immunizations are up to date. Advice and education were given regarding nutrition and aerobic exercise. Patient discussion: discussed with the patient.  Physical with preventive exam as well as age and risk appropriate counseling completed.   Patient Discussion: plan discussed with the patient, follow-up visit needed in one year. Medication Review and medication list updated  Advised Monthly self breast assessment and annual breast imaging and Calcium, 600 mg/ Vit. D, 400 IU daily; regular weight-bearing exercise             Paty Reinoso MD

## 2023-04-07 ENCOUNTER — LAB (OUTPATIENT)
Dept: LAB | Facility: HOSPITAL | Age: 48
End: 2023-04-07
Payer: COMMERCIAL

## 2023-04-07 LAB
ALBUMIN SERPL-MCNC: 4.4 G/DL (ref 3.5–5.2)
ALBUMIN/GLOB SERPL: 1.8 G/DL
ALP SERPL-CCNC: 50 U/L (ref 39–117)
ALT SERPL W P-5'-P-CCNC: 10 U/L (ref 1–33)
ANION GAP SERPL CALCULATED.3IONS-SCNC: 7.6 MMOL/L (ref 5–15)
AST SERPL-CCNC: 13 U/L (ref 1–32)
BASOPHILS # BLD AUTO: 0.06 10*3/MM3 (ref 0–0.2)
BASOPHILS NFR BLD AUTO: 0.9 % (ref 0–1.5)
BILIRUB SERPL-MCNC: 1.4 MG/DL (ref 0–1.2)
BUN SERPL-MCNC: 16 MG/DL (ref 6–20)
BUN/CREAT SERPL: 18.2 (ref 7–25)
CALCIUM SPEC-SCNC: 9.3 MG/DL (ref 8.6–10.5)
CHLORIDE SERPL-SCNC: 103 MMOL/L (ref 98–107)
CHOLEST SERPL-MCNC: 163 MG/DL (ref 0–200)
CO2 SERPL-SCNC: 25.4 MMOL/L (ref 22–29)
CREAT SERPL-MCNC: 0.88 MG/DL (ref 0.57–1)
DEPRECATED RDW RBC AUTO: 41.4 FL (ref 37–54)
EGFRCR SERPLBLD CKD-EPI 2021: 82.2 ML/MIN/1.73
EOSINOPHIL # BLD AUTO: 0.27 10*3/MM3 (ref 0–0.4)
EOSINOPHIL NFR BLD AUTO: 4.1 % (ref 0.3–6.2)
ERYTHROCYTE [DISTWIDTH] IN BLOOD BY AUTOMATED COUNT: 12.1 % (ref 12.3–15.4)
GLOBULIN UR ELPH-MCNC: 2.4 GM/DL
GLUCOSE SERPL-MCNC: 94 MG/DL (ref 65–99)
HBA1C MFR BLD: 5.2 % (ref 4.8–5.6)
HCT VFR BLD AUTO: 41.1 % (ref 34–46.6)
HDLC SERPL-MCNC: 72 MG/DL (ref 40–60)
HGB BLD-MCNC: 13.3 G/DL (ref 12–15.9)
IMM GRANULOCYTES # BLD AUTO: 0.01 10*3/MM3 (ref 0–0.05)
IMM GRANULOCYTES NFR BLD AUTO: 0.2 % (ref 0–0.5)
LDLC SERPL CALC-MCNC: 81 MG/DL (ref 0–100)
LDLC/HDLC SERPL: 1.13 {RATIO}
LYMPHOCYTES # BLD AUTO: 1.65 10*3/MM3 (ref 0.7–3.1)
LYMPHOCYTES NFR BLD AUTO: 24.9 % (ref 19.6–45.3)
MCH RBC QN AUTO: 30.2 PG (ref 26.6–33)
MCHC RBC AUTO-ENTMCNC: 32.4 G/DL (ref 31.5–35.7)
MCV RBC AUTO: 93.4 FL (ref 79–97)
MONOCYTES # BLD AUTO: 0.41 10*3/MM3 (ref 0.1–0.9)
MONOCYTES NFR BLD AUTO: 6.2 % (ref 5–12)
NEUTROPHILS NFR BLD AUTO: 4.22 10*3/MM3 (ref 1.7–7)
NEUTROPHILS NFR BLD AUTO: 63.7 % (ref 42.7–76)
NRBC BLD AUTO-RTO: 0 /100 WBC (ref 0–0.2)
PLATELET # BLD AUTO: 255 10*3/MM3 (ref 140–450)
PMV BLD AUTO: 9.7 FL (ref 6–12)
POTASSIUM SERPL-SCNC: 4.2 MMOL/L (ref 3.5–5.2)
PROT SERPL-MCNC: 6.8 G/DL (ref 6–8.5)
RBC # BLD AUTO: 4.4 10*6/MM3 (ref 3.77–5.28)
SODIUM SERPL-SCNC: 136 MMOL/L (ref 136–145)
TRIGL SERPL-MCNC: 47 MG/DL (ref 0–150)
TSH SERPL DL<=0.05 MIU/L-ACNC: 3.06 UIU/ML (ref 0.27–4.2)
VIT B12 BLD-MCNC: 516 PG/ML (ref 211–946)
VLDLC SERPL-MCNC: 10 MG/DL (ref 5–40)
WBC NRBC COR # BLD: 6.62 10*3/MM3 (ref 3.4–10.8)

## 2023-04-07 PROCEDURE — 82607 VITAMIN B-12: CPT | Performed by: INTERNAL MEDICINE

## 2023-04-07 PROCEDURE — 36415 COLL VENOUS BLD VENIPUNCTURE: CPT | Performed by: INTERNAL MEDICINE

## 2023-04-07 PROCEDURE — 83036 HEMOGLOBIN GLYCOSYLATED A1C: CPT | Performed by: INTERNAL MEDICINE

## 2023-04-07 PROCEDURE — 80050 GENERAL HEALTH PANEL: CPT | Performed by: INTERNAL MEDICINE

## 2023-04-07 PROCEDURE — 80061 LIPID PANEL: CPT | Performed by: INTERNAL MEDICINE

## 2023-04-11 DIAGNOSIS — E03.8 ADULT ONSET HYPOTHYROIDISM: ICD-10-CM

## 2023-04-11 RX ORDER — LEVOTHYROXINE SODIUM 0.03 MG/1
25 TABLET ORAL DAILY
Qty: 90 TABLET | Refills: 2 | Status: SHIPPED | OUTPATIENT
Start: 2023-04-11

## 2023-08-04 ENCOUNTER — OFFICE VISIT (OUTPATIENT)
Dept: OBSTETRICS AND GYNECOLOGY | Facility: CLINIC | Age: 48
End: 2023-08-04
Payer: COMMERCIAL

## 2023-08-04 VITALS — BODY MASS INDEX: 23.96 KG/M2 | WEIGHT: 144 LBS | SYSTOLIC BLOOD PRESSURE: 102 MMHG | DIASTOLIC BLOOD PRESSURE: 62 MMHG

## 2023-08-04 DIAGNOSIS — Z01.419 ROUTINE GYNECOLOGICAL EXAMINATION: Primary | ICD-10-CM

## 2023-08-04 DIAGNOSIS — N95.1 MENOPAUSAL SYMPTOMS: ICD-10-CM

## 2023-08-04 DIAGNOSIS — Z12.4 SCREENING FOR CERVICAL CANCER: ICD-10-CM

## 2023-08-04 DIAGNOSIS — Z12.31 ENCOUNTER FOR SCREENING MAMMOGRAM FOR MALIGNANT NEOPLASM OF BREAST: ICD-10-CM

## 2023-08-05 LAB
ESTRADIOL SERPL-MCNC: 134 PG/ML
FSH SERPL-ACNC: 13.8 MIU/ML

## 2023-08-08 LAB — REF LAB TEST METHOD: NORMAL

## 2023-08-18 ENCOUNTER — TRANSCRIBE ORDERS (OUTPATIENT)
Dept: ADMINISTRATIVE | Facility: HOSPITAL | Age: 48
End: 2023-08-18
Payer: COMMERCIAL

## 2023-08-18 DIAGNOSIS — Z12.31 SCREENING MAMMOGRAM FOR BREAST CANCER: Primary | ICD-10-CM

## 2023-10-04 ENCOUNTER — OFFICE VISIT (OUTPATIENT)
Dept: OBSTETRICS AND GYNECOLOGY | Facility: CLINIC | Age: 48
End: 2023-10-04
Payer: COMMERCIAL

## 2023-10-04 VITALS
WEIGHT: 138.6 LBS | DIASTOLIC BLOOD PRESSURE: 62 MMHG | BODY MASS INDEX: 23.09 KG/M2 | HEIGHT: 65 IN | SYSTOLIC BLOOD PRESSURE: 106 MMHG

## 2023-10-04 DIAGNOSIS — N95.1 MENOPAUSAL SYMPTOMS: Primary | ICD-10-CM

## 2023-10-04 PROCEDURE — 99213 OFFICE O/P EST LOW 20 MIN: CPT | Performed by: PHYSICIAN ASSISTANT

## 2023-10-04 RX ORDER — ESTRADIOL 0.1 MG/G
CREAM VAGINAL
COMMUNITY

## 2023-10-04 NOTE — PROGRESS NOTES
Subjective   Chief Complaint   Patient presents with    Follow-up     Complains of hot flashes       Lucille Linder is a 47 y.o. year old  presenting to be seen for menopausal symptoms.  Patient has been experiencing increasing hot flashes and night sweats for several weeks.  She has an IUD and has no bleeds with the IUD.  She had been seen a couple months ago for annual exam and menopause labs were checked at that time.  FSH was 13.8.  Estradiol level was 134.  These levels had actually decreased compared to labs that were checked in .  She is on thyroid medication with recent thyroid levels normal range.    Past Medical History:   Diagnosis Date    Allergic     B12 deficiency     Encounter for insertion of ParaGard IUD 2013    Headache     Hyperlipidemia     Hypothyroidism     Migraine     Ovarian cyst     Restless leg syndrome     Snores     Urinary tract infection     Varicella         Current Outpatient Medications:     levothyroxine (SYNTHROID, LEVOTHROID) 25 MCG tablet, Take 1 tablet by mouth Daily. , Disp: 90 tablet, Rfl: 2    B Complex-C (SUPER B COMPLEX PO), Take  by mouth. (Patient not taking: Reported on 10/4/2023), Disp: , Rfl:     CRANBERRY JUICE EXTRACT PO, Take  by mouth Daily. (Patient not taking: Reported on 10/4/2023), Disp: , Rfl:     cyclobenzaprine (FLEXERIL) 10 MG tablet, Take 1 tablet by mouth At Night As Needed for Muscle Spasms. (Patient not taking: Reported on 10/4/2023), Disp: 30 tablet, Rfl: 2    estradiol (ESTRACE) 0.1 MG/GM vaginal cream, , Disp: , Rfl:     levonorgestrel (Mirena, 52 MG,) 20 MCG/24HR IUD, Take to prescriber's office, Disp: 1 each, Rfl: 0    Probiotic Product (PROBIOTIC DAILY PO), Take  by mouth. (Patient not taking: Reported on 10/4/2023), Disp: , Rfl:     vitamin E 400 UNIT capsule, Take 1 capsule by mouth Daily. (Patient not taking: Reported on 10/4/2023), Disp: , Rfl:    No Known Allergies   Past Surgical History:  "  Procedure Laterality Date    COLONOSCOPY N/A 04/04/2017    Procedure: COLONOSCOPY WITH ANOSCOPY;  Surgeon: Cezar Greenberg MD;  Location: [facility] ENDOSCOPY;  Service:     WISDOM TOOTH EXTRACTION        Social History     Socioeconomic History    Marital status:    Tobacco Use    Smoking status: Never    Smokeless tobacco: Never   Vaping Use    Vaping Use: Never used   Substance and Sexual Activity    Alcohol use: Yes     Alcohol/week: 9.0 standard drinks     Types: 2 Glasses of wine, 4 Cans of beer, 3 Shots of liquor per week     Comment: OCCASSIONALLY-1 DRINK PER DAY.  USUALLY BEER    Drug use: No    Sexual activity: Yes     Partners: Male     Birth control/protection: I.U.D.      Family History   Problem Relation Age of Onset    Lung cancer Father     Other Other         arteriosclerotic cardiovascular disease, cerebrovascular accident    Breast cancer Other     Breast cancer Paternal Grandmother     Thyroid disease Sister     Melanoma Sister     Colon cancer Neg Hx        Review of Systems   Constitutional:  Negative for chills, diaphoresis and fever.   Gastrointestinal:  Negative for constipation, diarrhea, nausea and vomiting.   Endocrine: Positive for heat intolerance.   Genitourinary:  Negative for difficulty urinating, dysuria, menstrual problem, pelvic pain and vaginal bleeding.         Objective   /62   Ht 165.1 cm (65\")   Wt 62.9 kg (138 lb 9.6 oz)   LMP  (Exact Date)   BMI 23.06 kg/m²     Physical Exam  Constitutional:       Appearance: Normal appearance. She is well-developed and well-groomed.   Eyes:      General: Lids are normal.      Extraocular Movements: Extraocular movements intact.      Conjunctiva/sclera: Conjunctivae normal.   Neurological:      General: No focal deficit present.      Mental Status: She is alert and oriented to person, place, and time.   Psychiatric:         Attention and Perception: Attention normal.         Mood and Affect: Mood normal.         Speech: Speech " normal.         Behavior: Behavior is cooperative.          Result Review :           Follicle Stimulating Hormone (08/04/2023 15:24)   Estradiol (08/04/2023 15:24)   TSH (04/07/2023 08:58)   Follicle Stimulating Hormone (10/21/2021 10:21)   Estradiol (10/21/2021 10:21)       Assessment and Plan  Diagnoses and all orders for this visit:    1. Menopausal symptoms (Primary)      Patient Instructions   Patient is again counseled that menopause labs are trending in perimenopausal range but not menopause range.  We discussed limiting caffeine as caffeine can be a trigger for hot flashes.  Limiting hot beverages.  Stress management could improve hot flashes as well.  She is ultimately offered options for medical treatment of menopausal symptoms with consideration of hormone replacement therapy and also nonhormonal options available.  At this time the patient thinks that she will try to reduce her caffeine intake.  She may try over-the-counter natural options such as Estroven or Amberen.  She is encouraged to call or return to the office at any time if symptoms worsen.           This note was electronically signed.    Radha Pradhan PA-C   October 4, 2023

## 2023-10-04 NOTE — PATIENT INSTRUCTIONS
Patient is again counseled that menopause labs are trending in perimenopausal range but not menopause range.  We discussed limiting caffeine as caffeine can be a trigger for hot flashes.  Limiting hot beverages.  Stress management could improve hot flashes as well.  She is ultimately offered options for medical treatment of menopausal symptoms with consideration of hormone replacement therapy and also nonhormonal options available.  At this time the patient thinks that she will try to reduce her caffeine intake.  She may try over-the-counter natural options such as Estroven or Amberen.  She is encouraged to call or return to the office at any time if symptoms worsen.

## 2023-11-29 ENCOUNTER — HOSPITAL ENCOUNTER (OUTPATIENT)
Dept: MAMMOGRAPHY | Facility: HOSPITAL | Age: 48
Discharge: HOME | End: 2023-11-29
Admitting: OBSTETRICS & GYNECOLOGY
Payer: COMMERCIAL

## 2023-11-29 DIAGNOSIS — Z12.31 SCREENING MAMMOGRAM FOR BREAST CANCER: ICD-10-CM

## 2023-11-29 PROCEDURE — 77067 SCR MAMMO BI INCL CAD: CPT

## 2023-11-29 PROCEDURE — 77063 BREAST TOMOSYNTHESIS BI: CPT

## 2023-12-26 ENCOUNTER — TELEPHONE (OUTPATIENT)
Dept: OBSTETRICS AND GYNECOLOGY | Facility: CLINIC | Age: 48
End: 2023-12-26
Payer: COMMERCIAL

## 2023-12-26 NOTE — TELEPHONE ENCOUNTER
Caller: Lucille Linder    Relationship: Self    Best call back number: [phone]    What is the best time to reach you: ANYTIME    What was the call regarding: PATIENT WOULD LIKE TO KNOW IF SHE CAN BE SEEN TODAY TO CHECK FOR UTI OR YEAST INFECTION. SYMPTOMS ARE DISCHARGE, DISCOMFORT WITH URINATION.     OK TO LVM

## 2023-12-28 ENCOUNTER — TELEPHONE (OUTPATIENT)
Dept: OBSTETRICS AND GYNECOLOGY | Facility: CLINIC | Age: 48
End: 2023-12-28
Payer: COMMERCIAL

## 2023-12-28 NOTE — TELEPHONE ENCOUNTER
Caller: Kailash Lucillemaurice Collier Ng    Relationship: Self    Best call back number: [phone]    Requested Prescriptions: ESTROGEN CREAM  Requested Prescriptions      No prescriptions requested or ordered in this encounter        Pharmacy where request should be sent:  [id number]    Last office visit with prescribing clinician: 10/4/2023   Last telemedicine visit with prescribing clinician: Visit date not found   Next office visit with prescribing clinician: Visit date not found     Additional details provided by patient: PT WAS SEEN ON 10/4/23. WAS TOLD IF SHE DECIDES ON THE ESTROGEN CREAM FOR USE. GIVE THE OFFICE AN CALL AND PROVIDER WILL SEND PRESCRIPTION TO PHARMACY.    Does the patient have less than a 3 day supply:  [x] Yes  [] No    Would you like a call back once the refill request has been completed: [x] Yes [] No    If the office needs to give you a call back, can they leave a voicemail: [x] Yes [] No    Jairo Cho Rep   12/28/23 11:38 EST

## 2023-12-29 RX ORDER — ESTRADIOL 0.1 MG/G
CREAM VAGINAL
Qty: 42.5 EACH | Refills: 4 | Status: SHIPPED | OUTPATIENT
Start: 2023-12-29

## 2024-01-02 DIAGNOSIS — E03.8 ADULT ONSET HYPOTHYROIDISM: ICD-10-CM

## 2024-01-02 RX ORDER — LEVOTHYROXINE SODIUM 0.03 MG/1
25 TABLET ORAL DAILY
Qty: 90 TABLET | Refills: 0 | Status: SHIPPED | OUTPATIENT
Start: 2024-01-02

## 2024-03-07 ENCOUNTER — OFFICE VISIT (OUTPATIENT)
Dept: INTERNAL MEDICINE | Facility: CLINIC | Age: 49
End: 2024-03-07
Payer: COMMERCIAL

## 2024-03-07 VITALS
HEIGHT: 65 IN | DIASTOLIC BLOOD PRESSURE: 82 MMHG | HEART RATE: 77 BPM | BODY MASS INDEX: 23.49 KG/M2 | TEMPERATURE: 97.7 F | OXYGEN SATURATION: 99 % | SYSTOLIC BLOOD PRESSURE: 117 MMHG | RESPIRATION RATE: 16 BRPM | WEIGHT: 141 LBS

## 2024-03-07 DIAGNOSIS — Z00.00 ROUTINE GENERAL MEDICAL EXAMINATION AT A HEALTH CARE FACILITY: Primary | ICD-10-CM

## 2024-03-07 PROCEDURE — 99396 PREV VISIT EST AGE 40-64: CPT | Performed by: INTERNAL MEDICINE

## 2024-03-07 NOTE — PROGRESS NOTES
Subjective   Lucille Linder is a 48 y.o. female.     Chief Complaint   Patient presents with    Annual Exam       History of Present Illness   Patient is here for a physical and due for labs and is following up on hypothyroidism  she saw gyn and had pap done      Past Medical History:   Diagnosis Date    Allergic     B12 deficiency 2015    Encounter for insertion of ParaGard IUD 03/2013    Headache     Hyperlipidemia     Hypothyroidism 2015    Migraine     Ovarian cyst     Restless leg syndrome 1995    Snores     Urinary tract infection     Varicella        Past Surgical History:   Procedure Laterality Date    COLONOSCOPY N/A 04/04/2017    Procedure: COLONOSCOPY WITH ANOSCOPY;  Surgeon: Cezar Greenberg MD;  Location: [facility] ENDOSCOPY;  Service:     WISDOM TOOTH EXTRACTION         Family History   Problem Relation Age of Onset    Lung cancer Father     Other Other         arteriosclerotic cardiovascular disease, cerebrovascular accident    Breast cancer Other     Breast cancer Paternal Grandmother     Thyroid disease Sister     Melanoma Sister     Colon cancer Neg Hx         reports that she has never smoked. She has never used smokeless tobacco. She reports current alcohol use of about 9.0 standard drinks of alcohol per week. She reports that she does not use drugs.    No Known Allergies        Current Outpatient Medications:     B Complex-C (SUPER B COMPLEX PO), Take  by mouth., Disp: , Rfl:     CRANBERRY JUICE EXTRACT PO, Take  by mouth Daily., Disp: , Rfl:     estradiol (ESTRACE VAGINAL) 0.1 MG/GM vaginal cream, Insert 1 gm intravaginally 2 times each week, Disp: 42.5 each, Rfl: 4    levothyroxine (SYNTHROID, LEVOTHROID) 25 MCG tablet, TAKE 1 TABLET BY MOUTH EVERY DAY, Disp: 90 tablet, Rfl: 0    Probiotic Product (PROBIOTIC DAILY PO), Take  by mouth., Disp: , Rfl:     vitamin E 400 UNIT capsule, Take 1 capsule by mouth Daily., Disp: , Rfl:     levonorgestrel (Mirena, 52 MG,) 20 MCG/24HR IUD, Take to  "prescriber's office, Disp: 1 each, Rfl: 0      Objective   Blood pressure 117/82, pulse 77, temperature 97.7 °F (36.5 °C), resp. rate 16, height 165.1 cm (65\"), weight 64 kg (141 lb), SpO2 99%, not currently breastfeeding.    Physical Exam  Vitals and nursing note reviewed.   Constitutional:       General: She is not in acute distress.     Appearance: Normal appearance. She is not diaphoretic.   HENT:      Head: Normocephalic and atraumatic.      Right Ear: External ear normal.      Left Ear: External ear normal.      Nose: Nose normal.   Eyes:      Extraocular Movements: Extraocular movements intact.      Conjunctiva/sclera: Conjunctivae normal.   Neck:      Trachea: Trachea normal.   Cardiovascular:      Rate and Rhythm: Normal rate and regular rhythm.      Heart sounds: Normal heart sounds.   Pulmonary:      Effort: Pulmonary effort is normal. No respiratory distress.      Breath sounds: Normal breath sounds.   Abdominal:      General: Abdomen is flat.   Musculoskeletal:      Cervical back: Neck supple.      Comments: Moves all limbs   Skin:     General: Skin is warm.   Neurological:      Mental Status: She is alert and oriented to person, place, and time.      Comments: No gross motor or sensory deficits         BMI is within normal parameters. No other follow-up for BMI required.      Results for orders placed or performed in visit on 08/04/23   Estradiol    Specimen: Blood   Result Value Ref Range    Estradiol 134.0 pg/mL   Follicle Stimulating Hormone    Specimen: Blood   Result Value Ref Range    FSH 13.8 mIU/mL   LIQUID-BASED PAP SMEAR WITH HPV GENOTYPING REGARDLESS OF INTERPRETATION (JOVITA,COR,MAD)    Specimen: Cervix; ThinPrep Vial   Result Value Ref Range    Reference Lab Report       [facility]  [address] [place] [address]  Phone: [phone] or [phone]  Fax: [phone]  Marcelino Rose M.D., Medical Director    PATIENT NAME                                    "  LABORATORY NO.  [id number]   CARLO SOLARES.                      [id number]  [id number]                                 AGE                    SEX   SSN              CLIENT REF #  [facility]                        47        1975      F     [id number]      [id number]    [address] [facility] 3          REQUESTING M.D.           ATTENDING M.D.         [place] [address]                         DATE COLLECTED            DATE RECEIVED          DATE REPORTED  2023    ThinPrep Pap with [facility]    DIAGNOSIS:  Negative for intraepithelial lesion or malignancy    Multiple factors can influence accuracy of Pap  tests; therefore, screening at  regular intervals is necessary for early cancer detection.      SPECIMEN ADEQUACY:  SATISFACTORY FOR EVALUATION  Transformation zone is absent or insufficient.  SOURCE OF SPECIMEN:       CERVICAL/ENDOCERVICAL  SLIDES:  1  CLINICAL HISTORY:  A Routine  Encounter for screening mammogram for malignant neoplasm of breast    HPV  HR-HPV POOL: Negative    The Aptima HPV assay is an in vitro nucleic acid amplification test for the  qualitative detection of E6/E7 viral messenger RNA from 14 high risk types of  HPV in cervical specimens. The high risk HPV types detected include: 16, 18,  31, 33, 35, 39, 45, 51, 52, 56, 58, 59, 66, 68    CYTOTECHNOLOGIST:             SDS, CT (ASCP)    CPT CODES:  60914, 96935           Assessment & Plan   Diagnoses and all orders for this visit:    1. Routine general medical examination at a health care facility (Primary)  -     CBC (No Diff)  -     Comprehensive Metabolic Panel  -     Lipid Panel  -     Hemoglobin A1c  -     TSH  -     Vitamin B12      plan:  1.physical: Physical exam conducted today,  Will obtain fasting lab work,   Impression: healthy adult Patient. Currently, patient eats a  healthy diet. Screening lab work includes glucose, lipid profile , complete blood count and comprehensive panel . The risks and benefits of immunizations were discussed and immunizations are up to date. Advice and education were given regarding nutrition and aerobic exercise. Patient discussion: discussed with the patient.  Physical with preventive exam as well as age and risk appropriate counseling completed.   Patient Discussion: plan discussed with the patient, follow-up visit needed in one year. Medication Review and medication list updated  Advised Monthly self breast assessment and annual breast imaging and Calcium, 600 mg/ Vit. D, 400 IU daily; regular weight-bearing exercise             Paty Reinoso MD

## 2024-03-14 LAB
ALBUMIN SERPL-MCNC: 4.6 G/DL (ref 3.5–5.2)
ALBUMIN/GLOB SERPL: 2 G/DL
ALP SERPL-CCNC: 60 U/L (ref 39–117)
ALT SERPL-CCNC: 8 U/L (ref 1–33)
AST SERPL-CCNC: 15 U/L (ref 1–32)
BILIRUB SERPL-MCNC: 1.7 MG/DL (ref 0–1.2)
BUN SERPL-MCNC: 15 MG/DL (ref 6–20)
BUN/CREAT SERPL: 16.3 (ref 7–25)
CALCIUM SERPL-MCNC: 9.4 MG/DL (ref 8.6–10.5)
CHLORIDE SERPL-SCNC: 104 MMOL/L (ref 98–107)
CHOLEST SERPL-MCNC: 195 MG/DL (ref 0–200)
CO2 SERPL-SCNC: 22.2 MMOL/L (ref 22–29)
CREAT SERPL-MCNC: 0.92 MG/DL (ref 0.57–1)
EGFRCR SERPLBLD CKD-EPI 2021: 77.4 ML/MIN/1.73
ERYTHROCYTE [DISTWIDTH] IN BLOOD BY AUTOMATED COUNT: 11.8 % (ref 12.3–15.4)
GLOBULIN SER CALC-MCNC: 2.3 GM/DL
GLUCOSE SERPL-MCNC: 88 MG/DL (ref 65–99)
HBA1C MFR BLD: 5.5 % (ref 4.8–5.6)
HCT VFR BLD AUTO: 38.7 % (ref 34–46.6)
HDLC SERPL-MCNC: 72 MG/DL (ref 40–60)
HGB BLD-MCNC: 12.8 G/DL (ref 12–15.9)
LDLC SERPL CALC-MCNC: 112 MG/DL (ref 0–100)
MCH RBC QN AUTO: 30 PG (ref 26.6–33)
MCHC RBC AUTO-ENTMCNC: 33.1 G/DL (ref 31.5–35.7)
MCV RBC AUTO: 90.8 FL (ref 79–97)
PLATELET # BLD AUTO: 257 10*3/MM3 (ref 140–450)
POTASSIUM SERPL-SCNC: 4.1 MMOL/L (ref 3.5–5.2)
PROT SERPL-MCNC: 6.9 G/DL (ref 6–8.5)
RBC # BLD AUTO: 4.26 10*6/MM3 (ref 3.77–5.28)
SODIUM SERPL-SCNC: 140 MMOL/L (ref 136–145)
TRIGL SERPL-MCNC: 62 MG/DL (ref 0–150)
TSH SERPL DL<=0.005 MIU/L-ACNC: 2.56 UIU/ML (ref 0.27–4.2)
VIT B12 SERPL-MCNC: 387 PG/ML (ref 211–946)
VLDLC SERPL CALC-MCNC: 11 MG/DL (ref 5–40)
WBC # BLD AUTO: 5.74 10*3/MM3 (ref 3.4–10.8)
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
## 2024-04-08 DIAGNOSIS — E03.8 ADULT ONSET HYPOTHYROIDISM: ICD-10-CM

## 2024-04-08 RX ORDER — LEVOTHYROXINE SODIUM 0.03 MG/1
25 TABLET ORAL DAILY
Qty: 90 TABLET | Refills: 0 | Status: SHIPPED | OUTPATIENT
Start: 2024-04-08

## 2024-07-04 DIAGNOSIS — E03.8 ADULT ONSET HYPOTHYROIDISM: ICD-10-CM

## 2024-07-05 RX ORDER — LEVOTHYROXINE SODIUM 0.03 MG/1
25 TABLET ORAL DAILY
Qty: 90 TABLET | Refills: 0 | Status: SHIPPED | OUTPATIENT
Start: 2024-07-05

## 2024-09-03 ENCOUNTER — TELEPHONE (OUTPATIENT)
Dept: INTERNAL MEDICINE | Facility: CLINIC | Age: 49
End: 2024-09-03
Payer: COMMERCIAL

## 2024-09-03 NOTE — TELEPHONE ENCOUNTER
Caller: Lucille Linder    Relationship: Self    Best call back number: [phone]     What orders are you requesting (i.e. lab or imaging): ORDERS FOR 6 MONTH RECHECK FOR THYROID LEVELS     In what timeframe would the patient need to come in: AS SOON AS POSSIBLE     Where will you receive your lab/imaging services: IN OFFICE     Additional notes: PLEASE CALL PATIENT TO ADVISE IF THESE LABS ARE NEEDED, IF SO PLEASE PLACE ORDER AND ADVISE WHEN THEY HAVE BEEN PLACED.   PATIENT HAS BEEN HAVING SPELLS OF HOT FLASHES AND WOULD LIKE TO KNOW IF THERE IS LABS THAT CAN CHECK AS TO WHY THIS IS HAPPENING , PLEASE CALL PATIENT TO FURTHER DISCUSS.

## 2024-09-04 DIAGNOSIS — E03.8 ADULT ONSET HYPOTHYROIDISM: Primary | ICD-10-CM

## 2024-09-07 DIAGNOSIS — E03.8 ADULT ONSET HYPOTHYROIDISM: ICD-10-CM

## 2024-09-07 LAB — TSH SERPL DL<=0.005 MIU/L-ACNC: 1.69 UIU/ML (ref 0.45–4.5)

## 2024-09-07 RX ORDER — LEVOTHYROXINE SODIUM 25 UG/1
25 TABLET ORAL DAILY
Qty: 90 TABLET | Refills: 1 | Status: SHIPPED | OUTPATIENT
Start: 2024-09-07

## 2024-09-17 ENCOUNTER — TELEPHONE (OUTPATIENT)
Dept: INTERNAL MEDICINE | Facility: CLINIC | Age: 49
End: 2024-09-17

## 2024-09-26 ENCOUNTER — TELEPHONE (OUTPATIENT)
Dept: INTERNAL MEDICINE | Facility: CLINIC | Age: 49
End: 2024-09-26
Payer: COMMERCIAL

## 2024-10-16 ENCOUNTER — OFFICE VISIT (OUTPATIENT)
Dept: INTERNAL MEDICINE | Facility: CLINIC | Age: 49
End: 2024-10-16
Payer: COMMERCIAL

## 2024-10-16 VITALS
SYSTOLIC BLOOD PRESSURE: 129 MMHG | HEART RATE: 67 BPM | OXYGEN SATURATION: 99 % | HEIGHT: 65 IN | DIASTOLIC BLOOD PRESSURE: 82 MMHG | BODY MASS INDEX: 23.66 KG/M2 | RESPIRATION RATE: 20 BRPM | WEIGHT: 142 LBS | TEMPERATURE: 98.2 F

## 2024-10-16 DIAGNOSIS — Z23 NEED FOR INFLUENZA VACCINATION: ICD-10-CM

## 2024-10-16 DIAGNOSIS — M62.838 MUSCLE SPASMS OF NECK: ICD-10-CM

## 2024-10-16 DIAGNOSIS — M54.6 THORACOLUMBAR BACK PAIN: Primary | ICD-10-CM

## 2024-10-16 DIAGNOSIS — R13.14 PHARYNGOESOPHAGEAL DYSPHAGIA: ICD-10-CM

## 2024-10-16 DIAGNOSIS — M54.50 THORACOLUMBAR BACK PAIN: Primary | ICD-10-CM

## 2024-10-16 DIAGNOSIS — M62.830 MUSCLE SPASM OF BACK: ICD-10-CM

## 2024-10-16 DIAGNOSIS — Z12.11 COLON CANCER SCREENING: ICD-10-CM

## 2024-10-16 PROCEDURE — 90471 IMMUNIZATION ADMIN: CPT | Performed by: INTERNAL MEDICINE

## 2024-10-16 PROCEDURE — 90656 IIV3 VACC NO PRSV 0.5 ML IM: CPT | Performed by: INTERNAL MEDICINE

## 2024-10-16 PROCEDURE — 99214 OFFICE O/P EST MOD 30 MIN: CPT | Performed by: INTERNAL MEDICINE

## 2024-10-16 NOTE — PROGRESS NOTES
"Chief Complaint  Scoliosis (Would like to discuss and have checked), Joint Pain (Bilateral index fingers, pain comes and goes), and Difficulty Swallowing (For about one year)    Subjective        Lucille Linder presents to [facility]  Patient is here complaining of chronic back pain, she states her daughter was recently diagnosed with scoliosis and she would like to be checked for that, she has had thoracic and lumbar x-rays and MRI done in the past which has been reviewed with her today, she also complains of pain in the joints intermittently which is mostly in the index finger, she complains of dryness around her right eye the upper eyelid and just below she is advised to follow-up with her eye doctor, she does complain of difficulty swallowing when she feels that food is getting stuck for the past year, she had a colonoscopy in 2017, she is due for a flu vaccine    Objective   Vital Signs:  /82   Pulse 67   Temp 98.2 °F (36.8 °C)   Resp 20   Ht 165.1 cm (65\")   Wt 64.4 kg (142 lb)   SpO2 99%   BMI 23.63 kg/m²   Estimated body mass index is 23.63 kg/m² as calculated from the following:    Height as of this encounter: 165.1 cm (65\").    Weight as of this encounter: 64.4 kg (142 lb).    BMI is within normal parameters. No other follow-up for BMI required.      Physical Exam  Vitals and nursing note reviewed.   Constitutional:       General: She is not in acute distress.     Appearance: Normal appearance. She is not diaphoretic.   HENT:      Head: Normocephalic and atraumatic.      Right Ear: External ear normal.      Left Ear: External ear normal.      Nose: Nose normal.   Eyes:      Extraocular Movements: Extraocular movements intact.      Conjunctiva/sclera: Conjunctivae normal.   Neck:      Trachea: Trachea normal.   Cardiovascular:      Rate and Rhythm: Normal rate and regular rhythm.      Heart sounds: Normal heart sounds.   Pulmonary:      Effort: " Pulmonary effort is normal. No respiratory distress.      Breath sounds: Normal breath sounds.   Abdominal:      General: Abdomen is flat.   Musculoskeletal:         General: Deformity present.      Cervical back: Neck supple.      Comments: Moves all limbs   Skin:     General: Skin is warm.   Neurological:      Mental Status: She is alert and oriented to person, place, and time.      Comments: No gross motor or sensory deficits        Result Review :  The following data was reviewed by: Paty Reinoso MD on 10/16/2024:  CMP          3/13/2024    09:29   CMP   Glucose 88    BUN 15    Creatinine 0.92    Sodium 140    Potassium 4.1    Chloride 104    Calcium 9.4    Total Protein 6.9    Albumin 4.6    Globulin 2.3    Total Bilirubin 1.7    Alkaline Phosphatase 60    AST (SGOT) 15    ALT (SGPT) 8    BUN/Creatinine Ratio 16.3      CBC          3/13/2024    09:29   CBC   WBC 5.74    RBC 4.26    Hemoglobin 12.8    Hematocrit 38.7    MCV 90.8    MCH 30.0    MCHC 33.1    RDW 11.8    Platelets 257      TSH          3/13/2024    09:29 9/6/2024    15:46   TSH   TSH 2.560  1.690              MRI Lumbar Spine Without Contrast (02/26/2021 20:41)  MRI Cervical Spine Without Contrast (02/07/2020 07:35)     Assessment and Plan   Diagnoses and all orders for this visit:    1. Thoracolumbar back pain (Primary)  -     Ambulatory Referral to Neurosurgery  -     Ambulatory Referral to Physical Therapy for Evaluation & Treatment    2. Muscle spasm of back  -     Ambulatory Referral to Physical Therapy for Evaluation & Treatment    3. Muscle spasms of neck  -     Ambulatory Referral to Physical Therapy for Evaluation & Treatment    4. Pharyngoesophageal dysphagia  -     Ambulatory Referral to Gastroenterology    5. Colon cancer screening  -     Ambulatory Referral to Gastroenterology    6. Need for influenza vaccination  -     Fluzone >6mos ([id number])    Plan:  1.  Thoracolumbar back pain: Refer patient to neurosurgery, x-rays and MRI  reviewed, will refer patient to physical therapy  2.  Muscle spasm of back: Refer to physical therapy  3.  Muscle spasm of neck: Seen by neurosurgery, will refer patient to physical therapy, MRI reviewed  4.  Dysphagia: Refer to GI  5.  Colon cancer screening: Refer patient to GI  6.  Need for flu vaccine: Given today and well-tolerated         Follow Up   No follow-ups on file.  Patient was given instructions and counseling regarding her condition or for health maintenance advice. Please see specific information pulled into the AVS if appropriate.

## 2024-11-02 ENCOUNTER — OFFICE VISIT (OUTPATIENT)
Dept: INTERNAL MEDICINE | Facility: CLINIC | Age: 49
End: 2024-11-02
Payer: COMMERCIAL

## 2024-11-02 VITALS
HEIGHT: 65 IN | RESPIRATION RATE: 20 BRPM | BODY MASS INDEX: 24.83 KG/M2 | HEART RATE: 85 BPM | WEIGHT: 149 LBS | OXYGEN SATURATION: 99 % | TEMPERATURE: 97.3 F | SYSTOLIC BLOOD PRESSURE: 124 MMHG | DIASTOLIC BLOOD PRESSURE: 70 MMHG

## 2024-11-02 DIAGNOSIS — H65.03 NON-RECURRENT ACUTE SEROUS OTITIS MEDIA OF BOTH EARS: ICD-10-CM

## 2024-11-02 DIAGNOSIS — R49.0 HOARSENESS OF VOICE: Primary | ICD-10-CM

## 2024-11-02 DIAGNOSIS — M79.641 RIGHT HAND PAIN: ICD-10-CM

## 2024-11-02 PROCEDURE — 99214 OFFICE O/P EST MOD 30 MIN: CPT | Performed by: STUDENT IN AN ORGANIZED HEALTH CARE EDUCATION/TRAINING PROGRAM

## 2024-11-02 RX ORDER — FLUTICASONE PROPIONATE 50 MCG
2 SPRAY, SUSPENSION (ML) NASAL DAILY
Qty: 16 G | Refills: 0 | Status: SHIPPED | OUTPATIENT
Start: 2024-11-02

## 2024-11-02 RX ORDER — LEVOCETIRIZINE DIHYDROCHLORIDE 5 MG/1
5 TABLET, FILM COATED ORAL EVERY EVENING
Qty: 30 TABLET | Refills: 1 | Status: SHIPPED | OUTPATIENT
Start: 2024-11-02

## 2024-11-04 NOTE — PROGRESS NOTES
Subjective   Lucille Linder is a 48 y.o. female.     History of Present Illness  Pt presents with complaints  States that this morning has felt like head is fuzzy and full. Kind of brain fog. Swimmy headed. Dizziness  Right hand pain yesterday. Better this morning. On dorsal surface 4th metatarsal. Pain was worse with pressure on it.       The following portions of the patient's history were reviewed and updated as appropriate: allergies, current medications, past family history, past medical history, past social history, past surgical history, and problem list.    Review of Systems   All other systems reviewed and are negative.      Objective   Physical Exam  Vitals and nursing note reviewed.   Constitutional:       Appearance: Normal appearance.   HENT:      Head: Normocephalic and atraumatic.      Right Ear: External ear normal. A middle ear effusion is present.      Left Ear: External ear normal. A middle ear effusion is present.      Nose: Nose normal.      Mouth/Throat:      Mouth: Mucous membranes are moist.      Pharynx: Oropharynx is clear. No oropharyngeal exudate or posterior oropharyngeal erythema.   Eyes:      Extraocular Movements: Extraocular movements intact.      Conjunctiva/sclera: Conjunctivae normal.      Pupils: Pupils are equal, round, and reactive to light.   Cardiovascular:      Rate and Rhythm: Regular rhythm.      Pulses: Normal pulses.      Heart sounds: Normal heart sounds.   Pulmonary:      Effort: Pulmonary effort is normal.   Abdominal:      General: Abdomen is flat. Bowel sounds are normal.      Palpations: Abdomen is soft.   Musculoskeletal:         General: Normal range of motion.      Cervical back: Normal range of motion.   Skin:     General: Skin is warm.      Capillary Refill: Capillary refill takes less than 2 seconds.   Neurological:      General: No focal deficit present.      Mental Status: She is alert and oriented to person, place, and time. Mental status is at  baseline.   Psychiatric:         Mood and Affect: Mood normal.         Behavior: Behavior normal.         Thought Content: Thought content normal.         Judgment: Judgment normal.         Assessment & Plan   Diagnoses and all orders for this visit:    1. Hoarseness of voice (Primary)    2. Non-recurrent acute serous otitis media of both ears  -     levocetirizine (XYZAL) 5 MG tablet; Take 1 tablet by mouth Every Evening.  Dispense: 30 tablet; Refill: 1  -     fluticasone (FLONASE) 50 MCG/ACT nasal spray; Administer 2 sprays into the nostril(s) as directed by provider Daily.  Dispense: 16 g; Refill: 0    3. Right hand pain       Counseled on heat ice motrin tylenol and epsom salt soaks for hand.

## 2024-11-26 ENCOUNTER — OFFICE VISIT (OUTPATIENT)
Dept: OBSTETRICS AND GYNECOLOGY | Facility: CLINIC | Age: 49
End: 2024-11-26
Payer: COMMERCIAL

## 2024-11-26 VITALS
WEIGHT: 143 LBS | HEIGHT: 65 IN | BODY MASS INDEX: 23.82 KG/M2 | SYSTOLIC BLOOD PRESSURE: 112 MMHG | DIASTOLIC BLOOD PRESSURE: 62 MMHG

## 2024-11-26 DIAGNOSIS — Z12.4 SCREENING FOR CERVICAL CANCER: ICD-10-CM

## 2024-11-26 DIAGNOSIS — Z01.419 WELL WOMAN EXAM WITH ROUTINE GYNECOLOGICAL EXAM: Primary | ICD-10-CM

## 2024-11-26 DIAGNOSIS — Z12.31 ENCOUNTER FOR SCREENING MAMMOGRAM FOR MALIGNANT NEOPLASM OF BREAST: ICD-10-CM

## 2024-11-26 DIAGNOSIS — N89.8 VAGINAL DRYNESS: ICD-10-CM

## 2024-11-26 RX ORDER — ESTRADIOL 0.1 MG/G
CREAM VAGINAL
Qty: 42.5 G | Refills: 4 | Status: SHIPPED | OUTPATIENT
Start: 2024-11-26

## 2024-11-26 NOTE — PROGRESS NOTES
Subjective   Chief Complaint   Patient presents with    Gynecologic Exam     Annual. Last pap 2023 WNL Negative HPV, No Transformation zone present.   Complains vaginal dryness        Lucille Linder is a 49 y.o. year old  presenting to be seen for her annual gynecological exam.   Has Mirena IUD inserted 2021  No bleeds with IUD  Has been having vaginal dryness both with intercourse and in general for several months  Occasional mild hot flash and night sweats but manageable   Screening mammogram due    Past Medical History:   Diagnosis Date    Allergic     B12 deficiency     Encounter for insertion of ParaGard IUD 2013    Headache     Hyperlipidemia     Hypothyroidism 2015    Migraine     Ovarian cyst     Restless leg syndrome     Snores     Urinary tract infection     Varicella         Current Outpatient Medications:     levothyroxine (SYNTHROID, LEVOTHROID) 25 MCG tablet, Take 1 tablet by mouth Daily., Disp: 90 tablet, Rfl: 1    estradiol (ESTRACE VAGINAL) 0.1 MG/GM vaginal cream, Insert 1 gm intravaginally 2 times each week, Disp: 42.5 g, Rfl: 4    fluticasone (FLONASE) 50 MCG/ACT nasal spray, Administer 2 sprays into the nostril(s) as directed by provider Daily. (Patient not taking: Reported on 2024), Disp: 16 g, Rfl: 0    levonorgestrel (Mirena, 52 MG,) 20 MCG/24HR IUD, Take to prescriber's office, Disp: 1 each, Rfl: 0   No Known Allergies   Past Surgical History:   Procedure Laterality Date    COLONOSCOPY N/A 2017    Procedure: COLONOSCOPY WITH ANOSCOPY;  Surgeon: Cezar Greenberg MD;  Location: [facility] ENDOSCOPY;  Service:     WISDOM TOOTH EXTRACTION        Social History     Socioeconomic History    Marital status:    Tobacco Use    Smoking status: Never    Smokeless tobacco: Never   Vaping Use    Vaping status: Never Used   Substance and Sexual Activity    Alcohol use: Yes     Alcohol/week: 9.0 standard drinks of alcohol     Types: 2 Glasses of wine,  "4 Cans of beer, 3 Shots of liquor per week     Comment: OCCASSIONALLY-1 DRINK PER DAY.  USUALLY BEER    Drug use: No    Sexual activity: Yes     Partners: Male     Birth control/protection: I.U.D.      Family History   Problem Relation Age of Onset    Lung cancer Father     Other Other         arteriosclerotic cardiovascular disease, cerebrovascular accident    Breast cancer Other     Breast cancer Paternal Grandmother     Thyroid disease Sister     Melanoma Sister     Colon cancer Neg Hx        Review of Systems   Constitutional:  Negative for chills, diaphoresis and fever.   Gastrointestinal:  Negative for constipation, diarrhea, nausea and vomiting.   Genitourinary:  Negative for difficulty urinating, dysuria, menstrual problem, pelvic pain, vaginal bleeding and vaginal discharge.           Objective   /62   Ht 165.1 cm (65\")   Wt 64.9 kg (143 lb)   BMI 23.80 kg/m²     Physical Exam  Exam conducted with a chaperone present.   Constitutional:       Appearance: Normal appearance. She is well-developed and well-groomed.   Eyes:      General: Lids are normal.      Extraocular Movements: Extraocular movements intact.      Conjunctiva/sclera: Conjunctivae normal.   Chest:   Breasts:     Breasts are symmetrical.      Right: No inverted nipple, mass, nipple discharge, skin change or tenderness.      Left: No inverted nipple, mass, nipple discharge, skin change or tenderness.   Abdominal:      General: There is no distension.      Palpations: Abdomen is soft.      Tenderness: There is no abdominal tenderness.   Genitourinary:     Exam position: Lithotomy position.      Labia:         Right: No rash, tenderness or lesion.         Left: No rash, tenderness or lesion.       Urethra: No prolapse, urethral pain, urethral swelling or urethral lesion.      Vagina: No vaginal discharge, tenderness or lesions.      Cervix: No cervical motion tenderness, discharge, friability or lesion.      Uterus: Not enlarged and not " tender.       Adnexa:         Right: No mass or tenderness.          Left: No mass or tenderness.        Comments: IUD strings 3 cm  Musculoskeletal:      Cervical back: Neck supple.   Lymphadenopathy:      Upper Body:      Right upper body: No axillary adenopathy.      Left upper body: No axillary adenopathy.   Skin:     General: Skin is warm and dry.      Findings: No lesion.   Neurological:      General: No focal deficit present.      Mental Status: She is alert and oriented to person, place, and time.   Psychiatric:         Attention and Perception: Attention normal.         Mood and Affect: Mood normal.         Speech: Speech normal.         Behavior: Behavior normal.         Thought Content: Thought content normal.            Result Review :                   Assessment and Plan  Diagnoses and all orders for this visit:    1. Well woman exam with routine gynecological exam (Primary)    2. Screening for cervical cancer  -     LIQUID-BASED PAP SMEAR WITH HPV GENOTYPING REGARDLESS OF INTERPRETATION (JOVITA,COR,MAD)    3. Vaginal dryness    4. Encounter for screening mammogram for malignant neoplasm of breast  -     Mammo Screening Digital Tomosynthesis Bilateral With CAD; Future    Other orders  -     estradiol (ESTRACE VAGINAL) 0.1 MG/GM vaginal cream; Insert 1 gm intravaginally 2 times each week  Dispense: 42.5 g; Refill: 4      Patient Instructions   Self breast exam monthly  Annual screening mammogram   Regular exercise             This note was electronically signed.    Radha Pradhan PA-C   November 26, 2024

## 2024-11-27 LAB — REF LAB TEST METHOD: NORMAL

## 2025-01-07 DIAGNOSIS — M53.3 SACRAL BACK PAIN: Primary | ICD-10-CM
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
## 2025-01-08 ENCOUNTER — HOSPITAL ENCOUNTER (OUTPATIENT)
Dept: GENERAL RADIOLOGY | Facility: HOSPITAL | Age: 50
Discharge: HOME | End: 2025-01-08
Admitting: INTERNAL MEDICINE
Payer: COMMERCIAL

## 2025-01-08 PROCEDURE — 72220 X-RAY EXAM SACRUM TAILBONE: CPT

## 2025-01-15 DIAGNOSIS — M54.50 LUMBOSACRAL PAIN: Primary | ICD-10-CM

## 2025-01-15 DIAGNOSIS — M53.3 SACRAL BACK PAIN: ICD-10-CM

## 2025-01-15 DIAGNOSIS — M62.830 MUSCLE SPASM OF BACK: ICD-10-CM

## 2025-01-15 DIAGNOSIS — M54.6 THORACOLUMBAR BACK PAIN: Primary | ICD-10-CM

## 2025-01-15 DIAGNOSIS — M54.50 THORACOLUMBAR BACK PAIN: Primary | ICD-10-CM

## 2025-01-15 DIAGNOSIS — M54.6 THORACOLUMBAR BACK PAIN: ICD-10-CM

## 2025-01-15 DIAGNOSIS — M54.50 THORACOLUMBAR BACK PAIN: ICD-10-CM

## 2025-01-15 DIAGNOSIS — M54.50 LUMBOSACRAL PAIN: ICD-10-CM

## 2025-01-21 ENCOUNTER — HOSPITAL ENCOUNTER (OUTPATIENT)
Dept: MAMMOGRAPHY | Facility: HOSPITAL | Age: 50
Discharge: HOME | End: 2025-01-21
Admitting: PHYSICIAN ASSISTANT
Payer: COMMERCIAL

## 2025-01-21 DIAGNOSIS — Z12.31 ENCOUNTER FOR SCREENING MAMMOGRAM FOR MALIGNANT NEOPLASM OF BREAST: ICD-10-CM

## 2025-01-21 PROCEDURE — 77063 BREAST TOMOSYNTHESIS BI: CPT

## 2025-01-21 PROCEDURE — 77067 SCR MAMMO BI INCL CAD: CPT

## 2025-02-04 ENCOUNTER — OFFICE VISIT (OUTPATIENT)
Dept: GASTROENTEROLOGY | Facility: CLINIC | Age: 50
End: 2025-02-04
Payer: COMMERCIAL

## 2025-02-04 VITALS
HEART RATE: 82 BPM | WEIGHT: 146 LBS | SYSTOLIC BLOOD PRESSURE: 120 MMHG | DIASTOLIC BLOOD PRESSURE: 74 MMHG | OXYGEN SATURATION: 98 % | BODY MASS INDEX: 24.3 KG/M2

## 2025-02-04 DIAGNOSIS — R13.19 ESOPHAGEAL DYSPHAGIA: ICD-10-CM

## 2025-02-04 DIAGNOSIS — Z12.12 ENCOUNTER FOR COLORECTAL CANCER SCREENING: Primary | ICD-10-CM

## 2025-02-04 DIAGNOSIS — Z12.11 ENCOUNTER FOR COLORECTAL CANCER SCREENING: Primary | ICD-10-CM

## 2025-02-04 RX ORDER — SODIUM CHLORIDE 9 MG/ML
30 INJECTION, SOLUTION INTRAVENOUS CONTINUOUS PRN
OUTPATIENT
Start: 2025-02-04 | End: 2025-02-05

## 2025-02-04 RX ORDER — HYDROCORTISONE 25 MG/G
CREAM TOPICAL
COMMUNITY
Start: 2025-01-20

## 2025-02-04 RX ORDER — SODIUM, POTASSIUM,MAG SULFATES 17.5-3.13G
SOLUTION, RECONSTITUTED, ORAL ORAL
Qty: 354 ML | Refills: 0 | Status: SHIPPED | OUTPATIENT
Start: 2025-02-04

## 2025-02-04 RX ORDER — TRIAMCINOLONE ACETONIDE 1 MG/G
OINTMENT TOPICAL
COMMUNITY
Start: 2025-01-20

## 2025-02-04 NOTE — PROGRESS NOTES
New Patient Consult      Date: 2025   Patient Name: Lucille Linder  MRN: [id number]  : 1975     Primary Care Provider: Paty Reinoso MD  Referring Provider: D'Costa    Chief Complaint   Patient presents with    Colon Cancer Screening    Difficulty Swallowing     History of Present Illness: Lucille Linder is a 49 y.o. female who is here today as a consultation with Gastroenterology for evaluation of Colon Cancer Screening and Difficulty Swallowing.    Last colonsocopy was in , no history of colon polyps. No family history of colon cancer. Was told recently by PCP that she was due for repeat colonoscopy. No abdominal pain. No rectal bleeding. Has regular daily stools.     Reports dysphagia which is intermittent, reports this new symptoms came on over the past 1 year. She has noticed mostly while eating, feels that food is sitting in her esophagus and drinks liquid to get it to go down. If she drinks alcohol (regardless of type of alcohol- wine or mixed drink), has hiccups which is totally new symptoms, has been avoiding. No heartburn or acid reflux. No choking on food. Father had Miller's esophagus. Never had EGD.     No prior history of liver disease. Nonalcoholic.     Subjective      Past Medical History:   Diagnosis Date    Allergic     B12 deficiency     Encounter for insertion of ParaGard IUD 2013    Headache     Hyperlipidemia     Hypothyroidism     Migraine     Ovarian cyst     Restless leg syndrome     Snores     Urinary tract infection     Varicella      Past Surgical History:   Procedure Laterality Date    COLONOSCOPY N/A 2017    Procedure: COLONOSCOPY WITH ANOSCOPY;  Surgeon: Cezar Greenberg MD;  Location: [facility] ENDOSCOPY;  Service:     WISDOM TOOTH EXTRACTION       Family History   Problem Relation Age of Onset    Lung cancer Father     Thyroid disease Sister     Melanoma Sister     Breast cancer Paternal Grandmother     Colon cancer Neg  Hx     Ovarian cancer Neg Hx      Social History     Socioeconomic History    Marital status:    Tobacco Use    Smoking status: Never    Smokeless tobacco: Never   Vaping Use    Vaping status: Never Used   Substance and Sexual Activity    Alcohol use: Yes     Alcohol/week: 9.0 standard drinks of alcohol     Types: 2 Glasses of wine, 4 Cans of beer, 3 Shots of liquor per week     Comment: OCCASSIONALLY-1 DRINK PER DAY.  USUALLY BEER    Drug use: No    Sexual activity: Yes     Partners: Male     Birth control/protection: I.U.D.     Current Outpatient Medications:     estradiol (ESTRACE VAGINAL) 0.1 MG/GM vaginal cream, Insert 1 gm intravaginally 2 times each week, Disp: 42.5 g, Rfl: 4    levothyroxine (SYNTHROID, LEVOTHROID) 25 MCG tablet, Take 1 tablet by mouth Daily., Disp: 90 tablet, Rfl: 1    hydrocortisone 2.5 % cream, APPLY ONE APPLICATION TOPICALLY 2 TIMES A DAY FOR 2 WEEKS. THEN REPREAT FOR FLRAE UPS (Patient not taking: Reported on 2/4/2025), Disp: , Rfl:     levonorgestrel (Mirena, 52 MG,) 20 MCG/24HR IUD, Take to prescriber's office, Disp: 1 each, Rfl: 0    triamcinolone (KENALOG) 0.1 % ointment, APPLY TOPICALLY TO THE AFFECTED AREA(S) 2 TIMES A DAY FOR 2 WEEKS, THEN REPEAT FOR FLARES (Patient not taking: Reported on 2/4/2025), Disp: , Rfl:     No Known Allergies    The following portions of the patient's history were reviewed and updated as appropriate: allergies, current medications, past family history, past medical history, past social history, past surgical history and problem list.    Objective     Physical Exam  Vitals reviewed.   Constitutional:       General: She is not in acute distress.     Appearance: Normal appearance. She is well-developed. She is not ill-appearing or diaphoretic.   HENT:      Head: Normocephalic and atraumatic.      Right Ear: External ear normal.      Left Ear: External ear normal.      Nose: Nose normal.   Eyes:      General: No scleral icterus.        Right eye: No  discharge.         Left eye: No discharge.      Conjunctiva/sclera: Conjunctivae normal.   Neck:      Vascular: No JVD.   Cardiovascular:      Rate and Rhythm: Normal rate and regular rhythm.      Heart sounds: Normal heart sounds. No murmur heard.     No friction rub. No gallop.   Pulmonary:      Effort: Pulmonary effort is normal. No respiratory distress.      Breath sounds: Normal breath sounds. No wheezing or rales.   Abdominal:      General: Bowel sounds are normal. There is no distension.      Palpations: Abdomen is soft. There is no mass.      Tenderness: There is no abdominal tenderness. There is no guarding.   Musculoskeletal:         General: No deformity. Normal range of motion.      Cervical back: Normal range of motion.   Skin:     General: Skin is warm and dry.      Findings: No erythema or rash.   Neurological:      Mental Status: She is alert and oriented to person, place, and time.      Coordination: Coordination normal.   Psychiatric:         Mood and Affect: Mood normal.         Behavior: Behavior normal.         Thought Content: Thought content normal.         Judgment: Judgment normal.       Vitals:    02/04/25 1436   BP: 120/74   Pulse: 82   SpO2: 98%   Weight: 66.2 kg (146 lb)     Results Review:   I have reviewed the patient's new clinical and imaging results.    CBC (No Diff) (03/13/2024 09:29)  Comprehensive Metabolic Panel (03/13/2024 09:29)  Lipid Panel (03/13/2024 09:29)  Hemoglobin A1c (03/13/2024 09:29)  TSH (03/13/2024 09:29)  Vitamin B12 (03/13/2024 09:29)    No prior abdominal imaging on file.     Assessment / Plan      1. Encounter for colorectal cancer screening  Last colonsocopy was in 2017, no history of colon polyps. No family history of colon cancer. Will arrange colonoscopy for screening.     She requests EGD at time of colonoscopy    She will have an esophagogastroduodenoscopy and colonoscopy performed with monitored anesthesia care. The indications, technique, alternatives  and potential risk and complications were discussed with the patient including but not limited to bleeding, bowel perforations, missing lesions and anesthetic complications. The patient understands and wishes to proceed with the procedure and has given their verbal consent. Written patient education information was given to the patient. She should follow up in the office after this procedure to discuss the results and further recommendations can be made at that time. The patient will call if they have further questions before procedure.  - Case Request  - sodium-potassium-magnesium sulfates (SUPREP) 17.5-3.13-1.6 GM/177ML solution oral solution; Take 2 bottles by mouth 1 (One) Time for 1 dose. Please see prep instructions from office  Dispense: 354 mL; Refill: 0    2. Esophageal dysphagia  Dysphagia is intermittent, present for 1 year. She has noticed mostly while eating, feels that food is sitting in her esophagus and drinks liquid to get it to go down. Alcohol induces hiccups. No heartburn or acid reflux. No choking on food. Never had EGD.     Acid reflux measures  EGD will be arranged    Follow Up:   Return for follow up after procedure to discuss results.    Brit Krishna PA-C  [facility] [place]  2/4/2025  17:05 EST    Dictated Utilizing Dragon Dictation: Part of this note may be an electronic transcription/translation of spoken language to printed text using the Dragon Dictation System.

## 2025-03-17 ENCOUNTER — OFFICE VISIT (OUTPATIENT)
Dept: INTERNAL MEDICINE | Facility: CLINIC | Age: 50
End: 2025-03-17
Payer: COMMERCIAL

## 2025-03-17 VITALS
WEIGHT: 144.12 LBS | BODY MASS INDEX: 24.01 KG/M2 | HEIGHT: 65 IN | HEART RATE: 65 BPM | TEMPERATURE: 97 F | SYSTOLIC BLOOD PRESSURE: 111 MMHG | OXYGEN SATURATION: 100 % | RESPIRATION RATE: 16 BRPM | DIASTOLIC BLOOD PRESSURE: 66 MMHG

## 2025-03-17 DIAGNOSIS — Z00.00 ROUTINE GENERAL MEDICAL EXAMINATION AT A HEALTH CARE FACILITY: Primary | ICD-10-CM

## 2025-03-17 PROCEDURE — 99396 PREV VISIT EST AGE 40-64: CPT | Performed by: INTERNAL MEDICINE

## 2025-03-17 NOTE — PROGRESS NOTES
"Subjective   Lucille Linder is a 49 y.o. female.     Chief Complaint   Patient presents with    Annual Exam       History of Present Illness   Patient is here for a physical and due for lab work      Past Medical History:   Diagnosis Date    Allergic     B12 deficiency 2015    Encounter for insertion of ParaGard IUD 03/2013    Headache     Hyperlipidemia     Hypothyroidism 2015    Migraine     Ovarian cyst     Restless leg syndrome 1995    Snores     Urinary tract infection     Varicella        Past Surgical History:   Procedure Laterality Date    COLONOSCOPY N/A 04/04/2017    Procedure: COLONOSCOPY WITH ANOSCOPY;  Surgeon: Cezar Greenberg MD;  Location: [facility] ENDOSCOPY;  Service:     WISDOM TOOTH EXTRACTION         Family History   Problem Relation Age of Onset    Lung cancer Father     Cancer Father     Thyroid disease Sister     Melanoma Sister     Cancer Sister     Breast cancer Paternal Grandmother     Colon cancer Neg Hx     Ovarian cancer Neg Hx         reports that she has never smoked. She has never used smokeless tobacco. She reports current alcohol use of about 9.0 standard drinks of alcohol per week. She reports that she does not use drugs.    No Known Allergies        Current Outpatient Medications:     estradiol (ESTRACE VAGINAL) 0.1 MG/GM vaginal cream, Insert 1 gm intravaginally 2 times each week, Disp: 42.5 g, Rfl: 4    hydrocortisone 2.5 % cream, , Disp: , Rfl:     levothyroxine (SYNTHROID, LEVOTHROID) 25 MCG tablet, Take 1 tablet by mouth Daily., Disp: 90 tablet, Rfl: 1    triamcinolone (KENALOG) 0.1 % ointment, , Disp: , Rfl:     levonorgestrel (Mirena, 52 MG,) 20 MCG/24HR IUD, Take to prescriber's office, Disp: 1 each, Rfl: 0      Objective   Blood pressure 111/66, pulse 65, temperature 97 °F (36.1 °C), temperature source Temporal, resp. rate 16, height 165.1 cm (65\"), weight 65.4 kg (144 lb 1.9 oz), SpO2 100%, not currently breastfeeding.    Physical Exam  Vitals and nursing note " reviewed.   Constitutional:       General: She is not in acute distress.     Appearance: Normal appearance. She is not diaphoretic.   HENT:      Head: Normocephalic and atraumatic.      Right Ear: External ear normal.      Left Ear: External ear normal.      Nose: Nose normal.   Eyes:      Extraocular Movements: Extraocular movements intact.      Conjunctiva/sclera: Conjunctivae normal.   Neck:      Trachea: Trachea normal.   Cardiovascular:      Rate and Rhythm: Normal rate and regular rhythm.      Heart sounds: Normal heart sounds.   Pulmonary:      Effort: Pulmonary effort is normal. No respiratory distress.      Breath sounds: Normal breath sounds.   Abdominal:      General: Abdomen is flat.   Musculoskeletal:      Cervical back: Neck supple.      Comments: Moves all limbs   Skin:     General: Skin is warm.   Neurological:      Mental Status: She is alert and oriented to person, place, and time.      Comments: No gross motor or sensory deficits         BMI is within normal parameters. No other follow-up for BMI required.      Results for orders placed or performed in visit on 24   LIQUID-BASED PAP SMEAR WITH HPV GENOTYPING REGARDLESS OF INTERPRETATION (JOVITA,COR,MAD)    Collection Time: 24  3:01 PM    Specimen: Cervix; ThinPrep Vial   Result Value Ref Range    Reference Lab Report       [facility]  [address] [place] [address]  Phone: [phone] or [phone]  Fax: [phone]  Marcelino Rose M.D., Medical Director    PATIENT NAME                                     LABORATORY NO.  [id number]   CARLO SOLARES.                      [id number]  [id number]                                 AGE                    SEX   SSN              CLIENT REF #  [facility]                        49        1975      F     [id number]      [id number]    [address] [facility] 3          REQUESTING M.D.           ATTENDING MDarienD.         COPY TODarien MOORE  [place] [address]                         DATE COLLECTED            DATE RECEIVED          DATE REPORTED  11/26/2024 11/26/2024 11/27/2024    ThinPrep Pap with Cyty[facility] Imaging    DIAGNOSIS:  Negative for intraepithelial lesion or malignancy    Multiple factors can influence accuracy of Pap  tests; therefore, screening at  regular intervals is necessary for early cancer detection.      SPECIMEN ADEQUACY:  SATISFACTORY FOR EVALUATION  Transformation zone is present.  SOURCE OF SPECIMEN:       CERVICAL  SLIDES:  1  CLINICAL HISTORY:  Screening for cervical cancer, IUD    HPV  HR-HPV POOL: Negative    The Aptima HPV assay is an in vitro nucleic acid amplification test for the  qualitative detection of E6/E7 viral messenger RNA from 14 high risk types of  HPV in cervical specimens. The high risk HPV types detected include: 16, 18,  31, 33, 35, 39, 45, 51, 52, 56, 58, 59, 66, 68    CYTOTECHNOLOGIST:             MARLA GRAY (ASCP)    CPT CODES:  57678, 54929           Assessment & Plan   Diagnoses and all orders for this visit:    1. Routine general medical examination at a health care facility (Primary)  -     CBC (No Diff)  -     Comprehensive Metabolic Panel  -     Lipid Panel  -     Hemoglobin A1c  -     TSH  -     Vitamin B12    plan:  1.physical: Physical exam conducted today,  Will obtain fasting lab work,   Impression: healthy adult Patient. Currently, patient eats a healthy diet. Screening lab work includes glucose, lipid profile , complete blood count and comprehensive panel . The risks and benefits of immunizations were discussed and immunizations are up to date. Advice and education were given regarding nutrition and aerobic exercise. Patient discussion: discussed with the patient.  Physical with preventive exam as well as age and risk appropriate counseling completed.   Patient Discussion: plan discussed with the patient, follow-up  visit needed in one year. Medication Review and medication list updated  Advised Monthly self breast assessment and annual breast imaging and Calcium, 600 mg/ Vit. D, 400 IU daily; regular weight-bearing exercise               Paty Reinoso MD

## 2025-03-26 NOTE — PRE-PROCEDURE INSTRUCTIONS
PAT phone history completed with patient for upcoming procedure on 4/4/25 with Dr. Moreno.    PAT PASS reviewed with patient and they verbalize understanding of the following:     Do not eat or drink anything after midnight the night before procedure unless otherwise instructed by physician/surgeon's office, this includes no gum, candy, mints, tobacco products or e-cigarettes.  Do not shave the area to be operated on at least 48 hours prior to procedure.  Do not wear makeup, lotion, hair products, or nail polish.  Do not wear any jewelry and remove all piercings.  Do not wear any adhesive if you wear dentures.  Do not wear contacts; bring in glasses if needed.  Only take medications on the morning of procedure as instructed by PAT nurse per anesthesia guidelines or as instructed by physician's office.  If you are on any blood thinners reach out to the physician/surgeon's office for instructions on when/if they will need to be stopped prior to procedure.  Bring in picture ID and insurance card, advanced directive copies if applicable, CPAP/BIPAP/Inhalers if indicated morning of procedure, leave any other valuables at home.  Ensure you have arranged for someone to drive you home the day of your procedure and someone to care for you at home afterwards. It is recommended that you do not drive, drink alcohol, or make any major legal decisions for at least 24 hours after your procedure is complete.  ERAS instructions given unless otherwise instructed per surgeon's orders.    Instructions given on hospital entrance and registration location.

## 2025-04-04 ENCOUNTER — ANESTHESIA EVENT (OUTPATIENT)
Dept: GASTROENTEROLOGY | Facility: HOSPITAL | Age: 50
End: 2025-04-04
Payer: COMMERCIAL

## 2025-04-04 ENCOUNTER — ANESTHESIA (OUTPATIENT)
Dept: GASTROENTEROLOGY | Facility: HOSPITAL | Age: 50
End: 2025-04-04
Payer: COMMERCIAL

## 2025-04-04 ENCOUNTER — HOSPITAL ENCOUNTER (OUTPATIENT)
Facility: HOSPITAL | Age: 50
Setting detail: HOSPITAL OUTPATIENT SURGERY
Discharge: HOME | End: 2025-04-04
Attending: INTERNAL MEDICINE | Admitting: INTERNAL MEDICINE
Payer: COMMERCIAL

## 2025-04-04 VITALS
TEMPERATURE: 97.4 F | DIASTOLIC BLOOD PRESSURE: 79 MMHG | HEIGHT: 65 IN | BODY MASS INDEX: 23.99 KG/M2 | OXYGEN SATURATION: 96 % | HEART RATE: 83 BPM | SYSTOLIC BLOOD PRESSURE: 113 MMHG | RESPIRATION RATE: 16 BRPM | WEIGHT: 144 LBS

## 2025-04-04 DIAGNOSIS — Z12.12 ENCOUNTER FOR COLORECTAL CANCER SCREENING: ICD-10-CM

## 2025-04-04 DIAGNOSIS — R13.19 ESOPHAGEAL DYSPHAGIA: ICD-10-CM

## 2025-04-04 DIAGNOSIS — Z12.11 ENCOUNTER FOR COLORECTAL CANCER SCREENING: ICD-10-CM

## 2025-04-04 LAB
B-HCG UR QL: NEGATIVE
EXPIRATION DATE: NORMAL
INTERNAL NEGATIVE CONTROL: NORMAL
INTERNAL POSITIVE CONTROL: NORMAL
Lab: NORMAL

## 2025-04-04 PROCEDURE — 81025 URINE PREGNANCY TEST: CPT | Performed by: INTERNAL MEDICINE

## 2025-04-04 PROCEDURE — 25010000002 LIDOCAINE (CARDIAC): Performed by: NURSE ANESTHETIST, CERTIFIED REGISTERED

## 2025-04-04 PROCEDURE — 25810000003 SODIUM CHLORIDE 0.9 % SOLUTION: Performed by: NURSE ANESTHETIST, CERTIFIED REGISTERED

## 2025-04-04 PROCEDURE — 25010000002 FENTANYL CITRATE (PF) 50 MCG/ML SOLUTION PREFILLED SYRINGE: Performed by: NURSE ANESTHETIST, CERTIFIED REGISTERED

## 2025-04-04 PROCEDURE — 45385 COLONOSCOPY W/LESION REMOVAL: CPT | Performed by: INTERNAL MEDICINE

## 2025-04-04 PROCEDURE — C1769 GUIDE WIRE: HCPCS | Performed by: INTERNAL MEDICINE

## 2025-04-04 PROCEDURE — 25010000002 PROPOFOL 10 MG/ML EMULSION: Performed by: NURSE ANESTHETIST, CERTIFIED REGISTERED

## 2025-04-04 PROCEDURE — 43239 EGD BIOPSY SINGLE/MULTIPLE: CPT | Performed by: INTERNAL MEDICINE

## 2025-04-04 PROCEDURE — 43248 EGD GUIDE WIRE INSERTION: CPT | Performed by: INTERNAL MEDICINE

## 2025-04-04 RX ORDER — SIMETHICONE 40MG/0.6ML
SUSPENSION, DROPS(FINAL DOSAGE FORM)(ML) ORAL AS NEEDED
Status: DISCONTINUED | OUTPATIENT
Start: 2025-04-04 | End: 2025-04-04 | Stop reason: HOSPADM

## 2025-04-04 RX ORDER — PROPOFOL 10 MG/ML
VIAL (ML) INTRAVENOUS CONTINUOUS PRN
Status: DISCONTINUED | OUTPATIENT
Start: 2025-04-04 | End: 2025-04-04 | Stop reason: SURG

## 2025-04-04 RX ORDER — SODIUM CHLORIDE 9 MG/ML
30 INJECTION, SOLUTION INTRAVENOUS CONTINUOUS PRN
Status: DISCONTINUED | OUTPATIENT
Start: 2025-04-04 | End: 2025-04-04 | Stop reason: HOSPADM

## 2025-04-04 RX ORDER — FENTANYL CITRATE 50 UG/ML
INJECTION, SOLUTION INTRAMUSCULAR; INTRAVENOUS AS NEEDED
Status: DISCONTINUED | OUTPATIENT
Start: 2025-04-04 | End: 2025-04-04 | Stop reason: SURG

## 2025-04-04 RX ORDER — KETAMINE HCL IN NACL, ISO-OSM 100MG/10ML
SYRINGE (ML) INJECTION AS NEEDED
Status: DISCONTINUED | OUTPATIENT
Start: 2025-04-04 | End: 2025-04-04 | Stop reason: SURG

## 2025-04-04 RX ORDER — SODIUM CHLORIDE 9 MG/ML
INJECTION, SOLUTION INTRAVENOUS CONTINUOUS PRN
Status: DISCONTINUED | OUTPATIENT
Start: 2025-04-04 | End: 2025-04-04 | Stop reason: SURG

## 2025-04-04 RX ADMIN — SODIUM CHLORIDE: 9 INJECTION, SOLUTION INTRAVENOUS at 09:52

## 2025-04-04 RX ADMIN — PROPOFOL 200 MCG/KG/MIN: 10 INJECTION, EMULSION INTRAVENOUS at 09:58

## 2025-04-04 RX ADMIN — LIDOCAINE HYDROCHLORIDE 60 MG: 20 INJECTION, SOLUTION INTRAVENOUS at 09:58

## 2025-04-04 RX ADMIN — Medication 50 MG: at 10:13

## 2025-04-04 RX ADMIN — FENTANYL CITRATE 50 MCG: 50 INJECTION, SOLUTION INTRAMUSCULAR; INTRAVENOUS at 10:13

## 2025-04-04 NOTE — ANESTHESIA POSTPROCEDURE EVALUATION
Patient: Lucille Linder    Procedure Summary       Date: 04/04/25 Room / Location: [facility] ENDOSCOPY 2 / [facility] ENDOSCOPY    Anesthesia Start: 0952 Anesthesia Stop: 1047    Procedures:       Esophagogastroduodenoscopy with biopsy and savory dilatation      Colonoscopy with polypectomy Diagnosis:       Encounter for colorectal cancer screening      Esophageal dysphagia      (Encounter for colorectal cancer screening [Z12.11, Z12.12])      (Esophageal dysphagia [R13.19])    Surgeons: Myra Moreno MD Provider: Louis Archibald CRNA    Anesthesia Type: MAC ASA Status: 2            Anesthesia Type: MAC    Vitals  No vitals data found for the desired time range.          Post Anesthesia Care and Evaluation    Patient location during evaluation: PHASE II  Patient participation: complete - patient participated  Level of consciousness: awake and alert  Pain score: 0  Pain management: satisfactory to patient    Airway patency: patent  Anesthetic complications: No anesthetic complications  PONV Status: none  Cardiovascular status: acceptable and stable  Respiratory status: acceptable and spontaneous ventilation  Hydration status: acceptable    Comments: Vitals signs as noted in nursing documentation as per protocol.

## 2025-04-04 NOTE — H&P
[facility]  HISTORY AND PHYSICAL    Patient Name: Lucille Linder  : 1975  MRN: [id number]    Chief Complaint:   For EGD/screening  colonoscopy    History Of Presenting Illness:    Dysphagia  Colon cancer screening    Past Medical History:   Diagnosis Date    Allergic     B12 deficiency     Encounter for insertion of ParaGard IUD 2013    Headache     Hyperlipidemia     Hypothyroidism 2015    Migraine     Ovarian cyst     Restless leg syndrome     Snores     Urinary tract infection     Varicella        Past Surgical History:   Procedure Laterality Date    CARPAL TUNNEL RELEASE Right     COLONOSCOPY N/A 2017    Procedure: COLONOSCOPY WITH ANOSCOPY;  Surgeon: Cezar Greenberg MD;  Location: [facility] ENDOSCOPY;  Service:     WISDOM TOOTH EXTRACTION         Social History     Socioeconomic History    Marital status:    Tobacco Use    Smoking status: Never    Smokeless tobacco: Never   Vaping Use    Vaping status: Never Used   Substance and Sexual Activity    Alcohol use: Yes     Alcohol/week: 9.0 standard drinks of alcohol     Types: 2 Glasses of wine, 4 Cans of beer, 3 Shots of liquor per week     Comment: OCCASSIONALLY-1 DRINK PER DAY.  USUALLY BEER    Drug use: No    Sexual activity: Defer       Family History   Problem Relation Age of Onset    Lung cancer Father     Cancer Father     Thyroid disease Sister     Melanoma Sister     Cancer Sister     Breast cancer Paternal Grandmother     Colon cancer Neg Hx     Ovarian cancer Neg Hx        Prior to Admission Medications:  Medications Prior to Admission   Medication Sig Dispense Refill Last Dose/Taking    estradiol (ESTRACE VAGINAL) 0.1 MG/GM vaginal cream Insert 1 gm intravaginally 2 times each week 42.5 g 4 4/3/2025    hydrocortisone 2.5 % cream 1 Application 2 (Two) Times a Day As Needed.   4/3/2025    levothyroxine (SYNTHROID, LEVOTHROID) 25 MCG tablet Take 1 tablet by mouth Daily. 90 tablet 1 4/3/2025     triamcinolone (KENALOG) 0.1 % ointment Apply 1 Application topically to the appropriate area as directed 2 (Two) Times a Day As Needed.   4/3/2025    levonorgestrel (Mirena, 52 MG,) 20 MCG/24HR IUD Take to prescriber's office 1 each 0        Allergies:  No Known Allergies     Vitals: Temp:  [97.2 °F (36.2 °C)] 97.2 °F (36.2 °C)  Heart Rate:  [68] 68  Resp:  [16] 16  BP: (111)/(71) 111/71    Review Of Systems:  Constitutional:  Negative for chills, fever, and unexpected weight change.  Respiratory:  Negative for cough, chest tightness, shortness of breath, and wheezing.  Cardiovascular:  Negative for chest pain, palpitations, and leg swelling.  Gastrointestinal:  Negative for abdominal distention, abdominal pain, nausea, vomiting.  Neurological:  Negative for weakness, numbness, and headaches.     Physical Exam:    General Appearance:  Alert, cooperative, in no acute distress.   Lungs:   Clear to auscultation, respirations regular, even and                 unlabored.   Heart:  Regular rhythm and normal rate.   Abdomen:   Normal bowel sounds, no masses, no organomegaly. Soft, nontender, nondistended   Neurologic: Alert and oriented x 3. Moves all four limbs equally       Assessment & Plan     Assessment:  Active Problems:    Encounter for colorectal cancer screening    Esophageal dysphagia      Plan: Esophagogastroduodenoscopy with possible biopsy, polypectomy, dilatation, endoscopic band ligation or control of bleeding (N/A), Colonoscopy with possible biopsy, polypectomy, ablation of arteriovenous malformations or control of bleeding   CPT CODE:  (N/A)     Myra Moreno MD  4/4/2025

## 2025-04-04 NOTE — ANESTHESIA PREPROCEDURE EVALUATION
Anesthesia Evaluation     Patient summary reviewed and Nursing notes reviewed   no history of anesthetic complications:                Airway   Mallampati: I  TM distance: >3 FB  Neck ROM: full  no difficulty expected and Possible difficult intubation  Dental - normal exam     Pulmonary - negative pulmonary ROS and normal exam   Cardiovascular - negative cardio ROS and normal exam        Neuro/Psych  (+) numbness  GI/Hepatic/Renal/Endo    (+) thyroid problem hypothyroidism    Musculoskeletal     (+) back pain  Abdominal    Substance History - negative use     OB/GYN negative ob/gyn ROS         Other - negative ROS                     Anesthesia Plan    ASA 2     MAC     (Risks and benefits discussed including risk of aspiration, recall and dental damage. All patient questions answered. Will continue with POC.)  intravenous induction     Anesthetic plan, risks, benefits, and alternatives have been provided, discussed and informed consent has been obtained with: patient.

## 2025-04-04 NOTE — DISCHARGE INSTRUCTIONS
To assist you in voiding:  Drink plenty of fluids  Listen to running water while attempting to void.    If you are unable to urinate and you have an uncomfortable urge to void or it has been   6 hours since you were discharged, return to the Emergency Room.    - Discharge patient to home (ambulatory).   - Mechanical soft diet today.   - Await pathology results.   - Consider esophagogram and trial of PPI if dysphagia persists  - Repeat colonoscopy 7 yrs  - Follow up 8 weeks

## 2025-04-07 LAB — REF LAB TEST METHOD: NORMAL

## 2025-04-08 ENCOUNTER — RESULTS FOLLOW-UP (OUTPATIENT)
Dept: GASTROENTEROLOGY | Facility: HOSPITAL | Age: 50
End: 2025-04-08
Payer: COMMERCIAL

## 2025-04-09 NOTE — TELEPHONE ENCOUNTER
----- Message from Dunia MA sent at 4/8/2025  8:14 AM EDT -----    ----- Message -----  From: Myra Moreno MD  Sent: 4/7/2025   7:50 PM EDT  To: [facility]    Let her know that her gastric biopsies showed some inflammation but no bacteria in the stomach.  Esophageal biopsies were normal.  Polyp is precancerous type.  As it was small we will consider repeat   colonoscopy in anywhere 7 years time.    ----- Message -----  From: Lab, Background User  Sent: 4/7/2025   4:12 PM EDT  To: Myra Moreno MD

## 2025-04-12 DIAGNOSIS — E03.8 ADULT ONSET HYPOTHYROIDISM: ICD-10-CM

## 2025-04-12 NOTE — TELEPHONE ENCOUNTER
Rx Refill Note  Requested Prescriptions     Pending Prescriptions Disp Refills    levothyroxine (SYNTHROID, LEVOTHROID) 25 MCG tablet [Pharmacy Med Name: Levothyroxine Sodium Oral Tablet 25 MCG] 90 tablet 0     Sig: TAKE 1 TABLET BY MOUTH EVERY DAY      Last office visit with prescribing clinician: 3/17/2025   Last telemedicine visit with prescribing clinician: Visit date not found   Next office visit with prescribing clinician: 9/24/2025                         Would you like a call back once the refill request has been completed: [] Yes [] No    If the office needs to give you a call back, can they leave a voicemail: [] Yes [] No    Ginny Nunez MA  04/12/25, 13:16 EDT

## 2025-04-14 RX ORDER — LEVOTHYROXINE SODIUM 25 UG/1
25 TABLET ORAL DAILY
Qty: 90 TABLET | Refills: 2 | Status: SHIPPED | OUTPATIENT
Start: 2025-04-14

## 2025-05-03 NOTE — DISCHARGE INSTRUCTIONS
Diet:      Low Fat Diet.   Fiber One Cereal-40% Nutty Clusters 1/4 cup daily    Hood's All Bran-Bran Buds 1/4 cup daily.   Use skim, 1, 2 % or soy milk.   Drink water liberally.      Blood Thinner Directions:    Avoid Aspirin & other NSAIDS for _7__ days.  Tylenol is okay.    Treatments:    Cortizone 10- OINTMENT (Hydrocortisone 1% Ointment) Over the counter:   Apply ano-rectally & inside rectum 3 times daily for 1 week. Avoid GEL or Cream.      Other Instructions:      Call [facility] at [phone] or come to the Emergency   Department if you experience the following: Chest pain, abdominal pain, bleeding  (vomiting of blood or coffee colored material, black stools or elza blood in stools),   fever/chills, nausea and vomiting or dizziness.      Follow-up:  DR. LUZMARIA LYNCH in 4 weeks.Office phone # [phone].   EMS declines

## 2025-06-03 ENCOUNTER — OFFICE VISIT (OUTPATIENT)
Dept: INTERNAL MEDICINE | Facility: CLINIC | Age: 50
End: 2025-06-03
Payer: COMMERCIAL

## 2025-06-03 VITALS
OXYGEN SATURATION: 100 % | WEIGHT: 142 LBS | HEIGHT: 65 IN | TEMPERATURE: 98.6 F | RESPIRATION RATE: 18 BRPM | BODY MASS INDEX: 23.66 KG/M2 | SYSTOLIC BLOOD PRESSURE: 120 MMHG | HEART RATE: 74 BPM | DIASTOLIC BLOOD PRESSURE: 68 MMHG

## 2025-06-03 DIAGNOSIS — M13.0 CHRONIC POLYARTHRITIS: ICD-10-CM

## 2025-06-03 DIAGNOSIS — M54.2 CERVICALGIA: Primary | ICD-10-CM

## 2025-06-03 DIAGNOSIS — G89.29 CHRONIC PAIN OF BOTH ANKLES: ICD-10-CM

## 2025-06-03 DIAGNOSIS — M25.572 CHRONIC PAIN OF BOTH ANKLES: ICD-10-CM

## 2025-06-03 DIAGNOSIS — M25.571 CHRONIC PAIN OF BOTH ANKLES: ICD-10-CM

## 2025-06-03 DIAGNOSIS — S46.912A MUSCLE STRAIN OF LEFT SCAPULAR REGION, INITIAL ENCOUNTER: ICD-10-CM

## 2025-06-03 RX ORDER — DEXAMETHASONE SODIUM PHOSPHATE 4 MG/ML
4 INJECTION, SOLUTION INTRA-ARTICULAR; INTRALESIONAL; INTRAMUSCULAR; INTRAVENOUS; SOFT TISSUE ONCE
Status: COMPLETED | OUTPATIENT
Start: 2025-06-03 | End: 2025-06-03

## 2025-06-03 RX ORDER — CYCLOBENZAPRINE HCL 10 MG
10 TABLET ORAL NIGHTLY PRN
Qty: 30 TABLET | Refills: 3 | Status: SHIPPED | OUTPATIENT
Start: 2025-06-03

## 2025-06-03 RX ADMIN — DEXAMETHASONE SODIUM PHOSPHATE 4 MG: 4 INJECTION, SOLUTION INTRA-ARTICULAR; INTRALESIONAL; INTRAMUSCULAR; INTRAVENOUS; SOFT TISSUE at 15:47

## 2025-06-03 NOTE — PROGRESS NOTES
"Chief Complaint  Spasms (Left Shoulder spasm-intense pain lasting 4-8hours, a little better now. Hurts to turn head.) and Ankle Pain (Bilateral on the front of ankles that comes and goes.)    Subjective        Lucille Linder presents to [facility]  Patient states that she was moving and lifting last thursday and did have muscle spasm  , but now she has pain in the left shoulder and left posterior suprascapular area which is  intense and last for few hours , she has pain in the anterior  ankle pain  since for past year   and it can last few weeks  but when it hurts and it lasts the entire day  Ankle Pain          Objective   Vital Signs:  /68   Pulse 74   Temp 98.6 °F (37 °C) (Temporal)   Resp 18   Ht 165.1 cm (65\")   Wt 64.4 kg (142 lb)   SpO2 100%   BMI 23.63 kg/m²   Estimated body mass index is 23.63 kg/m² as calculated from the following:    Height as of this encounter: 165.1 cm (65\").    Weight as of this encounter: 64.4 kg (142 lb).    BMI is within normal parameters. No other follow-up for BMI required.      Physical Exam  Vitals and nursing note reviewed.   Constitutional:       General: She is not in acute distress.     Appearance: Normal appearance. She is not diaphoretic.   HENT:      Head: Normocephalic and atraumatic.      Right Ear: External ear normal.      Left Ear: External ear normal.      Nose: Nose normal.   Eyes:      Extraocular Movements: Extraocular movements intact.      Conjunctiva/sclera: Conjunctivae normal.   Neck:      Thyroid: No thyromegaly.        Comments: Pain and muscle   Cardiovascular:      Rate and Rhythm: Normal rate and regular rhythm.      Heart sounds: Normal heart sounds.   Pulmonary:      Effort: Pulmonary effort is normal.      Breath sounds: Normal breath sounds.   Abdominal:      General: Abdomen is flat.   Musculoskeletal:      Cervical back: Neck supple.   Skin:     General: Skin is warm.      Findings: No " erythema.   Neurological:      Mental Status: She is alert and oriented to person, place, and time.      Comments: No gross  motor or sensory deficits        Result Review :  The following data was reviewed by: Paty Reinoso MD on 06/03/2025:              Assessment and Plan   Diagnoses and all orders for this visit:    1. Cervicalgia (Primary)  -     dexAMETHasone (DECADRON) injection 4 mg  -     Diclofenac Sodium (VOLTAREN) 1 % gel gel; Apply 4 g topically to the appropriate area as directed 4 (Four) Times a Day As Needed (neck pain).  Dispense: 100 g; Refill: 2    2. Muscle strain of left scapular region, initial encounter  -     dexAMETHasone (DECADRON) injection 4 mg  -     cyclobenzaprine (FLEXERIL) 10 MG tablet; Take 1 tablet by mouth At Night As Needed for Muscle Spasms.  Dispense: 30 tablet; Refill: 3    3. Chronic pain of both ankles  -     dexAMETHasone (DECADRON) injection 4 mg    4. Chronic polyarthritis  -     Ambulatory Referral to Rheumatology  -     dexAMETHasone (DECADRON) injection 4 mg    Plan:  1.cervicalgia: Diclofenac, IM Decadron given in office today  2.  Strain of left scapular area: IM Decadron given in office today, as needed muscle relaxant  3.  Chronic ankle pain: IM Decadron given in office today  4.  Chronic polyarthritis: IM Decadron given in office today refer patient to rheumatology         Follow Up      Patient was given instructions and counseling regarding her condition or for health maintenance advice. Please see specific information pulled into the AVS if appropriate.

## 2025-07-03 ENCOUNTER — OFFICE VISIT (OUTPATIENT)
Dept: GASTROENTEROLOGY | Facility: CLINIC | Age: 50
End: 2025-07-03
Payer: COMMERCIAL

## 2025-07-03 VITALS
OXYGEN SATURATION: 99 % | DIASTOLIC BLOOD PRESSURE: 64 MMHG | WEIGHT: 147 LBS | SYSTOLIC BLOOD PRESSURE: 110 MMHG | BODY MASS INDEX: 24.46 KG/M2 | HEART RATE: 75 BPM

## 2025-07-03 DIAGNOSIS — R13.19 ESOPHAGEAL DYSPHAGIA: ICD-10-CM

## 2025-07-03 DIAGNOSIS — K29.50 CHRONIC GASTRITIS WITHOUT BLEEDING, UNSPECIFIED GASTRITIS TYPE: Primary | ICD-10-CM

## 2025-07-03 DIAGNOSIS — K44.9 HIATAL HERNIA: ICD-10-CM

## 2025-07-03 DIAGNOSIS — D12.6 TUBULAR ADENOMA OF COLON: ICD-10-CM

## 2025-07-03 PROCEDURE — 99214 OFFICE O/P EST MOD 30 MIN: CPT | Performed by: PHYSICIAN ASSISTANT

## 2025-07-03 RX ORDER — OMEPRAZOLE 20 MG/1
20 CAPSULE, DELAYED RELEASE ORAL DAILY
Qty: 30 CAPSULE | Refills: 0 | Status: SHIPPED | OUTPATIENT
Start: 2025-07-03

## 2025-07-03 NOTE — PROGRESS NOTES
Follow Up Note     Date: 2025   Patient Name: Lucille Linder  MRN: [id number]  : 1975     Primary Care Provider: Paty Reinoso MD     Chief Complaint   Patient presents with    Results     Colonoscopy and Upper GI Endoscopy     History of present illness:   7/3/2025  Lucille Linder is a 49 y.o. female who is here today for follow up regarding Results (Colonoscopy and Upper GI Endoscopy).    Would like to discuss EGD and colonoscopy results. Dysphagia seems some better, hard to tell because it was always intermittent. No current abdominal pain or current GI complaints.     Interval History:  2025  Last colonsocopy was in 2017, no history of colon polyps. No family history of colon cancer. Was told recently by PCP that she was due for repeat colonoscopy. No abdominal pain. No rectal bleeding. Has regular daily stools.      Reports dysphagia which is intermittent, reports this new symptoms came on over the past 1 year. She has noticed mostly while eating, feels that food is sitting in her esophagus and drinks liquid to get it to go down. If she drinks alcohol (regardless of type of alcohol- wine or mixed drink), has hiccups which is totally new symptoms, has been avoiding. No heartburn or acid reflux. No choking on food. Father had Miller's esophagus. Never had EGD.      No prior history of liver disease. Nonalcoholic.     Subjective     Past Medical History:   Diagnosis Date    Allergic     B12 deficiency     Encounter for insertion of ParaGard IUD 2013    Headache     Hyperlipidemia     Hypothyroidism     Migraine     Ovarian cyst     Restless leg syndrome     Snores     Urinary tract infection     Varicella      Past Surgical History:   Procedure Laterality Date    CARPAL TUNNEL RELEASE Right     COLONOSCOPY N/A 2017    Procedure: COLONOSCOPY WITH ANOSCOPY;  Surgeon: Cezar Greenberg MD;  Location: [facility] ENDOSCOPY;  Service:     COLONOSCOPY N/A  04/04/2025    Procedure: Colonoscopy with polypectomy;  Surgeon: Myra Moreno MD;  Location: [facility] ENDOSCOPY;  Service: Gastroenterology;  Laterality: N/A;    ENDOSCOPY N/A 04/04/2025    Procedure: Esophagogastroduodenoscopy with biopsy and savory dilatation;  Surgeon: Myra Moreno MD;  Location: [facility] ENDOSCOPY;  Service: Gastroenterology;  Laterality: N/A;    UPPER GASTROINTESTINAL ENDOSCOPY  4/4/25    WISDOM TOOTH EXTRACTION       Family History   Problem Relation Age of Onset    Lung cancer Father     Cancer Father     Thyroid disease Sister     Melanoma Sister     Cancer Sister     Breast cancer Paternal Grandmother     Colon cancer Neg Hx     Ovarian cancer Neg Hx      Social History     Socioeconomic History    Marital status:    Tobacco Use    Smoking status: Never     Passive exposure: Never    Smokeless tobacco: Never   Vaping Use    Vaping status: Never Used   Substance and Sexual Activity    Alcohol use: Yes     Alcohol/week: 9.0 standard drinks of alcohol     Types: 2 Glasses of wine, 4 Cans of beer, 3 Shots of liquor per week     Comment: OCCASSIONALLY-1 DRINK PER DAY.  USUALLY BEER    Drug use: No    Sexual activity: Yes     Partners: Male     Birth control/protection: I.U.D.     Current Outpatient Medications:     cyclobenzaprine (FLEXERIL) 10 MG tablet, Take 1 tablet by mouth At Night As Needed for Muscle Spasms., Disp: 30 tablet, Rfl: 3    Diclofenac Sodium (VOLTAREN) 1 % gel gel, Apply 4 g topically to the appropriate area as directed 4 (Four) Times a Day As Needed (neck pain)., Disp: 100 g, Rfl: 2    estradiol (ESTRACE VAGINAL) 0.1 MG/GM vaginal cream, Insert 1 gm intravaginally 2 times each week, Disp: 42.5 g, Rfl: 4    hydrocortisone 2.5 % cream, 1 Application 2 (Two) Times a Day As Needed., Disp: , Rfl:     levothyroxine (SYNTHROID, LEVOTHROID) 25 MCG tablet, TAKE 1 TABLET BY MOUTH EVERY DAY, Disp: 90 tablet, Rfl: 2    triamcinolone (KENALOG) 0.1 % ointment, Apply  1 Application topically to the appropriate area as directed 2 (Two) Times a Day As Needed., Disp: , Rfl:     levonorgestrel (Mirena, 52 MG,) 20 MCG/24HR IUD, Take to prescriber's office, Disp: 1 each, Rfl: 0    No Known Allergies    The following portions of the patient's history were reviewed and updated as appropriate: allergies, current medications, past family history, past medical history, past social history, past surgical history and problem list.    Objective     Physical Exam  Constitutional:       General: She is not in acute distress.     Appearance: Normal appearance. She is well-developed. She is not diaphoretic.   HENT:      Head: Normocephalic and atraumatic.      Right Ear: External ear normal.      Left Ear: External ear normal.      Nose: Nose normal.   Eyes:      General: No scleral icterus.        Right eye: No discharge.         Left eye: No discharge.      Conjunctiva/sclera: Conjunctivae normal.   Neck:      Trachea: No tracheal deviation.   Pulmonary:      Effort: Pulmonary effort is normal. No respiratory distress.   Musculoskeletal:         General: Normal range of motion.      Cervical back: Normal range of motion.   Skin:     Coloration: Skin is not pale.      Findings: No erythema or rash.   Neurological:      Mental Status: She is alert and oriented to person, place, and time.      Coordination: Coordination normal.   Psychiatric:         Mood and Affect: Mood normal.         Behavior: Behavior normal.         Thought Content: Thought content normal.         Judgment: Judgment normal.       Vitals:    07/03/25 1009   BP: 110/64   Pulse: 75   SpO2: 99%   Weight: 66.7 kg (147 lb)     Results Review:   I reviewed the patient's new clinical results.    CBC (No Diff) (03/13/2024 09:29)  Comprehensive Metabolic Panel (03/13/2024 09:29)  Lipid Panel (03/13/2024 09:29)  Hemoglobin A1c (03/13/2024 09:29)  TSH (03/13/2024 09:29)  Vitamin B12 (03/13/2024 09:29)     No prior abdominal imaging on  file.    EGD and colonoscopy 4/4/2025  - Normal oropharynx. - Z-line regular, 38 cm from the incisors. - No gross lesions in the entire esophagus. - No endoscopic esophageal abnormality to explain patient's dysphagia. Esophagus dilated empirically. Dilated. Biopsied. - 2 cm hiatal hernia. - Erythematous mucosa in the prepyloric region of the stomach. Biopsied. - Normal duodenal bulb, first portion of the duodenum, second portion of the duodenum and third portion of the duodenum.  - One 3 mm polyp in the distal ascending colon, removed with a cold snare. Resected and retrieved. - Non-bleeding external and internal hemorrhoids. - The examined portion of the ileum was normal.  Pathology     Assessment / Plan      1. Chronic gastritis without bleeding, unspecified gastritis type  2. Esophageal dysphagia  3. Hiatal hernia  Dysphagia has been intermittent, present >1 year. No current heartburn or acid reflux. No choking on food. EGD 4/2025 with hiatal hernia, erythema in the stomach. Path benign, no H pylori, had minimal chronic inflammation in the stomach. Dysphagia some better since esophageal dilatation.      Acid reflux measures  Consider esophagogram in the future if dysphagia persists  Start low dose PPI x 30 days    - omeprazole (priLOSEC) 20 MG capsule; Take 1 capsule by mouth Daily.  Dispense: 30 capsule; Refill: 0    4. Tubular adenoma of colon  Colonoscopy 4/2025 showed 1 small colon polyp, was tubular adenoma on pathology. No family history of colon cancer.     Repeat colonoscopy in 7 years for surveillance, due 4/2032      Follow Up:   Return if symptoms worsen or fail to improve. She will call back for GI appt if needed.     Brit Krishna PA-C  [facility] [place]  7/3/2025  12:05 EDT

## 2025-07-16 ENCOUNTER — OFFICE VISIT (OUTPATIENT)
Dept: OBSTETRICS AND GYNECOLOGY | Facility: CLINIC | Age: 50
End: 2025-07-16
Payer: COMMERCIAL

## 2025-07-16 VITALS
DIASTOLIC BLOOD PRESSURE: 60 MMHG | WEIGHT: 148 LBS | BODY MASS INDEX: 24.66 KG/M2 | SYSTOLIC BLOOD PRESSURE: 118 MMHG | HEIGHT: 65 IN

## 2025-07-16 DIAGNOSIS — N95.1 MENOPAUSAL SYMPTOMS: Primary | ICD-10-CM

## 2025-07-16 DIAGNOSIS — N89.8 VAGINAL DRYNESS: ICD-10-CM

## 2025-07-16 DIAGNOSIS — Z97.5 IUD (INTRAUTERINE DEVICE) IN PLACE: ICD-10-CM

## 2025-07-16 DIAGNOSIS — E03.9 HYPOTHYROIDISM, UNSPECIFIED TYPE: ICD-10-CM

## 2025-07-16 DIAGNOSIS — R63.5 WEIGHT GAIN: ICD-10-CM

## 2025-07-16 PROCEDURE — 99214 OFFICE O/P EST MOD 30 MIN: CPT | Performed by: OBSTETRICS & GYNECOLOGY

## 2025-07-16 RX ORDER — ESTRADIOL 10 UG/1
1 TABLET, FILM COATED VAGINAL 2 TIMES WEEKLY
Qty: 8 TABLET | Refills: 11 | Status: SHIPPED | OUTPATIENT
Start: 2025-07-17

## 2025-07-17 NOTE — PROGRESS NOTES
Chief Complaint  Menopause (Patient complains of menopausal symptoms, wants to discuss hormone replacement therapy. )     History of Present Illness:  Patient is 49 y.o.  who presents to [facility] here for evaluation and discussion of her menopausal symptoms and hormone replacement therapy.  Patient currently has Mirena IUD.  It was placed in 2021.  She does not have any vaginal bleeding.  She reports having hot flashes as well as night sweats.  They can be intermittent in nature.  She may have them for several months then have improvement followed by recurrent symptoms.  She does have vaginal dryness.  She has been using her estrogen cream but continues to have vaginal dryness.  She sees Dr. Reinoso for primary care.  She has complaints of weight gain.  She is due to have repeat labs.  She is on Synthroid.  She did have her mammogram earlier this year.    History  Past Medical History:   Diagnosis Date    Allergic     B12 deficiency     Encounter for insertion of ParaGard IUD 2013    Headache     Hyperlipidemia     Hypothyroidism     Migraine     Ovarian cyst     Restless leg syndrome     Snores     Urinary tract infection     Varicella      Current Outpatient Medications on File Prior to Visit   Medication Sig Dispense Refill    cyclobenzaprine (FLEXERIL) 10 MG tablet Take 1 tablet by mouth At Night As Needed for Muscle Spasms. 30 tablet 3    Diclofenac Sodium (VOLTAREN) 1 % gel gel Apply 4 g topically to the appropriate area as directed 4 (Four) Times a Day As Needed (neck pain). 100 g 2    estradiol (ESTRACE VAGINAL) 0.1 MG/GM vaginal cream Insert 1 gm intravaginally 2 times each week 42.5 g 4    hydrocortisone 2.5 % cream 1 Application 2 (Two) Times a Day As Needed.      levonorgestrel (Mirena, 52 MG,) 20 MCG/24HR IUD Take to prescriber's office 1 each 0    levothyroxine (SYNTHROID, LEVOTHROID) 25 MCG tablet TAKE 1 TABLET BY MOUTH EVERY DAY 90 tablet 2     "omeprazole (priLOSEC) 20 MG capsule Take 1 capsule by mouth Daily. 30 capsule 0    triamcinolone (KENALOG) 0.1 % ointment Apply 1 Application topically to the appropriate area as directed 2 (Two) Times a Day As Needed.       No current facility-administered medications on file prior to visit.     No Known Allergies  Past Surgical History:   Procedure Laterality Date    CARPAL TUNNEL RELEASE Right     COLONOSCOPY N/A 04/04/2017    Procedure: COLONOSCOPY WITH ANOSCOPY;  Surgeon: Cezar Greenberg MD;  Location: [facility] ENDOSCOPY;  Service:     COLONOSCOPY N/A 04/04/2025    Procedure: Colonoscopy with polypectomy;  Surgeon: Myra Moreno MD;  Location: [facility] ENDOSCOPY;  Service: Gastroenterology;  Laterality: N/A;    ENDOSCOPY N/A 04/04/2025    Procedure: Esophagogastroduodenoscopy with biopsy and savory dilatation;  Surgeon: Myra Moreno MD;  Location: [facility] ENDOSCOPY;  Service: Gastroenterology;  Laterality: N/A;    UPPER GASTROINTESTINAL ENDOSCOPY  4/4/25    WISDOM TOOTH EXTRACTION       Family History   Problem Relation Age of Onset    Lung cancer Father     Cancer Father     Thyroid disease Sister     Melanoma Sister     Cancer Sister     Breast cancer Paternal Grandmother     Colon cancer Neg Hx     Ovarian cancer Neg Hx      Social History     Socioeconomic History    Marital status:    Tobacco Use    Smoking status: Never     Passive exposure: Never    Smokeless tobacco: Never   Vaping Use    Vaping status: Never Used   Substance and Sexual Activity    Alcohol use: Yes     Alcohol/week: 9.0 standard drinks of alcohol     Types: 2 Glasses of wine, 4 Cans of beer, 3 Shots of liquor per week     Comment: OCCASSIONALLY-1 DRINK PER DAY.  USUALLY BEER    Drug use: No    Sexual activity: Yes     Partners: Male     Birth control/protection: I.U.D.       Physical Examination:  Vital Signs: /60   Ht 165.1 cm (65\")   Wt 67.1 kg (148 lb)   BMI 24.63 kg/m²     General Appearance: alert, " appears stated age, and cooperative  Breasts: Not performed.  Abdomen: no masses, no hepatomegaly, no splenomegaly, soft non-tender, no guarding, and no rebound tenderness  Pelvic: Not performed.    Data Review:  The following data was reviewed by: Paty Licea MD on 07/16/2025:     Labs:  CBC (No Diff) (03/13/2024 09:29)  Comprehensive Metabolic Panel (03/13/2024 09:29)  Lipid Panel (03/13/2024 09:29)  Hemoglobin A1c (03/13/2024 09:29)  TSH (03/13/2024 09:29)  Vitamin B12 (03/13/2024 09:29)  Imaging:  Mammo Screening Digital Tomosynthesis Bilateral With CAD (01/21/2025 07:55)   Medical Records:  None    Assessment and Plan   1. Menopausal symptoms  The various options for the management of menopausal symptoms was discussed.  The medical treatment options discussed include HRT, SSRIs, SSNRIs, clonidine, and gabapentin.  The risks and benefits were discussed including the findings from the WHI study.  The increased risk of breast cancer, CAD, stroke, and VTE events were discussed for combination therapy vs the increased risk of CV events and breast cancer not being seen in the estrogen only group.  The lowest effective dose for the shortest duration of treatment was discussed in regards to HRT.  Other alternatives including otc supplements and lifestyle changes were also discussed.  Local estrogen therapy to relieve atrophic vaginal symptoms was discussed was well as other alternatives.  Patient to consider the options as discussed.  She will obtain hormone levels as noted.  Patient will call if she desires any treatment.  - Follicle Stimulating Hormone  - Estradiol  - Testosterone, Free, Total    2. Vaginal dryness  Patient with continued vaginal dryness.  Will plan a trial with Vagifem.  Instructions and precautions have been given.  Patient to call if no improvement in her symptoms.  - estradiol (Vagifem) 10 MCG tablet vaginal tablet; Insert 1 tablet into the vagina 2 (Two) Times a Week.  Dispense: 8 tablet;  Refill: 11    3. IUD (intrauterine device) in place  Discussed with the patient the extenuation of the contraception efficacy by the FDA for Mirena IUD to 8 years.    4. Weight gain  Order given for labs.  Patient also to have follow-up TSH.    5. Hypothyroidism, unspecified type  Patient to continue her current dose of Synthroid at 25 mcg/day.  Labs as ordered.    Follow Up/Instructions:  Follow up as noted.  Patient was given instructions and counseling regarding her condition or for health maintenance advice. Please see specific information pulled into the AVS if appropriate.     Note: Speech recognition transcription software may have been used to dictate portions of this document.  An attempt at proofreading has been made though minor errors in transcription may still be present.    This note was electronically signed.  Paty Licea M.D.

## 2025-07-23 LAB
ALBUMIN SERPL-MCNC: 4.7 G/DL (ref 3.5–5.2)
ALBUMIN/GLOB SERPL: 2 G/DL
ALP SERPL-CCNC: 70 U/L (ref 39–117)
ALT SERPL-CCNC: 14 U/L (ref 1–33)
AST SERPL-CCNC: 22 U/L (ref 1–32)
BILIRUB SERPL-MCNC: 1.9 MG/DL (ref 0–1.2)
BUN SERPL-MCNC: 13 MG/DL (ref 6–20)
BUN/CREAT SERPL: 12.1 (ref 7–25)
CALCIUM SERPL-MCNC: 10.2 MG/DL (ref 8.6–10.5)
CHLORIDE SERPL-SCNC: 104 MMOL/L (ref 98–107)
CHOLEST SERPL-MCNC: 208 MG/DL (ref 0–200)
CO2 SERPL-SCNC: 25.3 MMOL/L (ref 22–29)
CREAT SERPL-MCNC: 1.07 MG/DL (ref 0.57–1)
EGFRCR SERPLBLD CKD-EPI 2021: 64.2 ML/MIN/1.73
ERYTHROCYTE [DISTWIDTH] IN BLOOD BY AUTOMATED COUNT: 12 % (ref 12.3–15.4)
GLOBULIN SER CALC-MCNC: 2.3 GM/DL
GLUCOSE SERPL-MCNC: 102 MG/DL (ref 65–99)
HBA1C MFR BLD: 5.5 % (ref 4.8–5.6)
HCT VFR BLD AUTO: 41 % (ref 34–46.6)
HDLC SERPL-MCNC: 95 MG/DL (ref 40–60)
HGB BLD-MCNC: 14.1 G/DL (ref 12–15.9)
LDLC SERPL CALC-MCNC: 102 MG/DL (ref 0–100)
MCH RBC QN AUTO: 31.4 PG (ref 26.6–33)
MCHC RBC AUTO-ENTMCNC: 34.4 G/DL (ref 31.5–35.7)
MCV RBC AUTO: 91.3 FL (ref 79–97)
PLATELET # BLD AUTO: 287 10*3/MM3 (ref 140–450)
POTASSIUM SERPL-SCNC: 4.4 MMOL/L (ref 3.5–5.2)
PROT SERPL-MCNC: 7 G/DL (ref 6–8.5)
RBC # BLD AUTO: 4.49 10*6/MM3 (ref 3.77–5.28)
SODIUM SERPL-SCNC: 140 MMOL/L (ref 136–145)
TRIGL SERPL-MCNC: 64 MG/DL (ref 0–150)
TSH SERPL DL<=0.005 MIU/L-ACNC: 2.56 UIU/ML (ref 0.27–4.2)
VIT B12 SERPL-MCNC: 465 PG/ML (ref 211–946)
VLDLC SERPL CALC-MCNC: 11 MG/DL (ref 5–40)
WBC # BLD AUTO: 5.3 10*3/MM3 (ref 3.4–10.8)

## 2025-07-24 LAB
ESTRADIOL SERPL-MCNC: <5 PG/ML
FSH SERPL-ACNC: 74.4 MIU/ML
TESTOST FREE SERPL-MCNC: 2.4 PG/ML (ref 0–4.2)
TESTOST SERPL-MCNC: 20 NG/DL (ref 4–50)

## (undated) DEVICE — Device

## (undated) DEVICE — SYR LUER SLPTP 50ML

## (undated) DEVICE — Device: Brand: SINGLE USE INJECTOR

## (undated) DEVICE — SINGLE-USE POLYPECTOMY SNARE: Brand: CAPTIVATOR II

## (undated) DEVICE — JELLY,LUBE,STERILE,FLIP TOP,TUBE,2-OZ: Brand: MEDLINE

## (undated) DEVICE — ENDOGATOR AUXILIARY WATER JET CONNECTOR: Brand: ENDOGATOR

## (undated) DEVICE — CANISTER, RIGID, 2000CC: Brand: MEDLINE INDUSTRIES, INC.

## (undated) DEVICE — LUBE JELLY PK/2.75GM STRL BX/144

## (undated) DEVICE — QUICK CATCH IN-LINE SUCTION POLYP TRAP IS USED FOR SUCTION RETRIEVAL OF ENDOSCOPICALLY REMOVED POLYPS.

## (undated) DEVICE — HYBRID CO2 TUBING/CAP SET FOR OLYMPUS® SCOPES & CO2 SOURCE: Brand: ERBE

## (undated) DEVICE — VLV SXN AIR/H2O ORCAPOD3 1P/U STRL

## (undated) DEVICE — ENDOSCOPY PORT CONNECTOR FOR OLYMPUS® SCOPES: Brand: ERBE

## (undated) DEVICE — FRCP BX RADJAW4 NDL 2.8 240 STD OG

## (undated) DEVICE — CONMED SCOPE SAVER BITE BLOCK, 20X27 MM: Brand: SCOPE SAVER